# Patient Record
Sex: FEMALE | Race: WHITE | NOT HISPANIC OR LATINO | Employment: OTHER | URBAN - METROPOLITAN AREA
[De-identification: names, ages, dates, MRNs, and addresses within clinical notes are randomized per-mention and may not be internally consistent; named-entity substitution may affect disease eponyms.]

---

## 2017-02-24 ENCOUNTER — CONVERSION ENCOUNTER (OUTPATIENT)
Dept: MAMMOGRAPHY | Facility: HOSPITAL | Age: 67
End: 2017-02-24

## 2018-03-08 ENCOUNTER — CONVERSION ENCOUNTER (OUTPATIENT)
Dept: MAMMOGRAPHY | Facility: HOSPITAL | Age: 68
End: 2018-03-08

## 2018-07-30 ENCOUNTER — OFFICE VISIT CONVERTED (OUTPATIENT)
Dept: ORTHOPEDIC SURGERY | Facility: CLINIC | Age: 68
End: 2018-07-30
Attending: ORTHOPAEDIC SURGERY

## 2018-08-21 ENCOUNTER — APPOINTMENT (OUTPATIENT)
Dept: GENERAL RADIOLOGY | Facility: HOSPITAL | Age: 68
End: 2018-08-21

## 2018-08-21 ENCOUNTER — HOSPITAL ENCOUNTER (OUTPATIENT)
Facility: HOSPITAL | Age: 68
Setting detail: OBSERVATION
Discharge: HOME | End: 2018-08-22
Attending: EMERGENCY MEDICINE | Admitting: EMERGENCY MEDICINE

## 2018-08-21 ENCOUNTER — TELEPHONE (OUTPATIENT)
Dept: CARDIOLOGY | Facility: CLINIC | Age: 68
End: 2018-08-21

## 2018-08-21 DIAGNOSIS — R06.09 DYSPNEA ON EXERTION: ICD-10-CM

## 2018-08-21 DIAGNOSIS — R07.2 PRECORDIAL CHEST PAIN: Primary | ICD-10-CM

## 2018-08-21 LAB
ALBUMIN SERPL-MCNC: 4.3 G/DL (ref 3.5–5.2)
ALBUMIN/GLOB SERPL: 1.3 G/DL
ALP SERPL-CCNC: 126 U/L (ref 39–117)
ALT SERPL W P-5'-P-CCNC: 30 U/L (ref 1–33)
ANION GAP SERPL CALCULATED.3IONS-SCNC: 12 MMOL/L
AST SERPL-CCNC: 24 U/L (ref 1–32)
BASOPHILS # BLD AUTO: 0.01 10*3/MM3 (ref 0–0.2)
BASOPHILS NFR BLD AUTO: 0.2 % (ref 0–1.5)
BILIRUB SERPL-MCNC: 0.6 MG/DL (ref 0.1–1.2)
BUN BLD-MCNC: 17 MG/DL (ref 8–23)
BUN/CREAT SERPL: 16.8 (ref 7–25)
CALCIUM SPEC-SCNC: 9.2 MG/DL (ref 8.6–10.5)
CHLORIDE SERPL-SCNC: 105 MMOL/L (ref 98–107)
CO2 SERPL-SCNC: 24 MMOL/L (ref 22–29)
CREAT BLD-MCNC: 1.01 MG/DL (ref 0.57–1)
D DIMER PPP FEU-MCNC: <0.27 MCGFEU/ML (ref 0–0.49)
DEPRECATED RDW RBC AUTO: 43.6 FL (ref 37–54)
EOSINOPHIL # BLD AUTO: 0.05 10*3/MM3 (ref 0–0.7)
EOSINOPHIL NFR BLD AUTO: 0.9 % (ref 0.3–6.2)
ERYTHROCYTE [DISTWIDTH] IN BLOOD BY AUTOMATED COUNT: 12.8 % (ref 11.7–13)
GFR SERPL CREATININE-BSD FRML MDRD: 55 ML/MIN/1.73
GFR SERPL CREATININE-BSD FRML MDRD: 66 ML/MIN/1.73
GLOBULIN UR ELPH-MCNC: 3.4 GM/DL
GLUCOSE BLD-MCNC: 85 MG/DL (ref 65–99)
HCT VFR BLD AUTO: 41.3 % (ref 35.6–45.5)
HGB BLD-MCNC: 14.1 G/DL (ref 11.9–15.5)
HOLD SPECIMEN: NORMAL
HOLD SPECIMEN: NORMAL
IMM GRANULOCYTES # BLD: 0.01 10*3/MM3 (ref 0–0.03)
IMM GRANULOCYTES NFR BLD: 0.2 % (ref 0–0.5)
LYMPHOCYTES # BLD AUTO: 1.78 10*3/MM3 (ref 0.9–4.8)
LYMPHOCYTES NFR BLD AUTO: 33.1 % (ref 19.6–45.3)
MCH RBC QN AUTO: 31.9 PG (ref 26.9–32)
MCHC RBC AUTO-ENTMCNC: 34.1 G/DL (ref 32.4–36.3)
MCV RBC AUTO: 93.4 FL (ref 80.5–98.2)
MONOCYTES # BLD AUTO: 0.4 10*3/MM3 (ref 0.2–1.2)
MONOCYTES NFR BLD AUTO: 7.4 % (ref 5–12)
NEUTROPHILS # BLD AUTO: 3.13 10*3/MM3 (ref 1.9–8.1)
NEUTROPHILS NFR BLD AUTO: 58.4 % (ref 42.7–76)
NT-PROBNP SERPL-MCNC: 112.8 PG/ML (ref 5–900)
PLATELET # BLD AUTO: 193 10*3/MM3 (ref 140–500)
PMV BLD AUTO: 11.9 FL (ref 6–12)
POTASSIUM BLD-SCNC: 4.8 MMOL/L (ref 3.5–5.2)
PROT SERPL-MCNC: 7.7 G/DL (ref 6–8.5)
RBC # BLD AUTO: 4.42 10*6/MM3 (ref 3.9–5.2)
SODIUM BLD-SCNC: 141 MMOL/L (ref 136–145)
TROPONIN T SERPL-MCNC: <0.01 NG/ML (ref 0–0.03)
WBC NRBC COR # BLD: 5.37 10*3/MM3 (ref 4.5–10.7)
WHOLE BLOOD HOLD SPECIMEN: NORMAL
WHOLE BLOOD HOLD SPECIMEN: NORMAL

## 2018-08-21 PROCEDURE — 85025 COMPLETE CBC W/AUTO DIFF WBC: CPT | Performed by: NURSE PRACTITIONER

## 2018-08-21 PROCEDURE — G0378 HOSPITAL OBSERVATION PER HR: HCPCS

## 2018-08-21 PROCEDURE — 93005 ELECTROCARDIOGRAM TRACING: CPT

## 2018-08-21 PROCEDURE — 84484 ASSAY OF TROPONIN QUANT: CPT | Performed by: NURSE PRACTITIONER

## 2018-08-21 PROCEDURE — 93010 ELECTROCARDIOGRAM REPORT: CPT | Performed by: INTERNAL MEDICINE

## 2018-08-21 PROCEDURE — 99284 EMERGENCY DEPT VISIT MOD MDM: CPT

## 2018-08-21 PROCEDURE — 85379 FIBRIN DEGRADATION QUANT: CPT | Performed by: NURSE PRACTITIONER

## 2018-08-21 PROCEDURE — 83880 ASSAY OF NATRIURETIC PEPTIDE: CPT | Performed by: NURSE PRACTITIONER

## 2018-08-21 PROCEDURE — 93005 ELECTROCARDIOGRAM TRACING: CPT | Performed by: EMERGENCY MEDICINE

## 2018-08-21 PROCEDURE — 71046 X-RAY EXAM CHEST 2 VIEWS: CPT

## 2018-08-21 PROCEDURE — 80053 COMPREHEN METABOLIC PANEL: CPT | Performed by: NURSE PRACTITIONER

## 2018-08-21 RX ORDER — PRENATAL VIT NO.126/IRON/FOLIC 28MG-0.8MG
1 TABLET ORAL DAILY
COMMUNITY

## 2018-08-21 RX ORDER — SODIUM CHLORIDE 0.9 % (FLUSH) 0.9 %
10 SYRINGE (ML) INJECTION AS NEEDED
Status: DISCONTINUED | OUTPATIENT
Start: 2018-08-21 | End: 2018-08-22 | Stop reason: HOSPADM

## 2018-08-21 RX ORDER — SODIUM CHLORIDE 0.9 % (FLUSH) 0.9 %
1-10 SYRINGE (ML) INJECTION AS NEEDED
Status: DISCONTINUED | OUTPATIENT
Start: 2018-08-21 | End: 2018-08-22 | Stop reason: HOSPADM

## 2018-08-21 RX ORDER — NITROGLYCERIN 0.4 MG/1
0.4 TABLET SUBLINGUAL
Status: COMPLETED | OUTPATIENT
Start: 2018-08-21 | End: 2018-08-22

## 2018-08-21 RX ORDER — ATENOLOL 50 MG/1
50 TABLET ORAL DAILY
COMMUNITY
End: 2018-10-01 | Stop reason: ALTCHOICE

## 2018-08-21 RX ORDER — CHOLECALCIFEROL (VITAMIN D3) 50 MCG
2000 TABLET ORAL DAILY
COMMUNITY
End: 2021-09-23

## 2018-08-21 RX ORDER — ASPIRIN 325 MG
162 TABLET ORAL DAILY
Status: DISCONTINUED | OUTPATIENT
Start: 2018-08-21 | End: 2018-08-22 | Stop reason: HOSPADM

## 2018-08-21 RX ORDER — ROPINIROLE 0.5 MG/1
1 TABLET, FILM COATED ORAL NIGHTLY
COMMUNITY
End: 2021-09-23 | Stop reason: DRUGHIGH

## 2018-08-21 RX ORDER — ESTRADIOL 1 MG/1
1 TABLET ORAL DAILY
COMMUNITY
End: 2021-09-23 | Stop reason: SDUPTHER

## 2018-08-21 RX ORDER — DICLOFENAC SODIUM AND MISOPROSTOL 75; 200 MG/1; UG/1
1 TABLET, DELAYED RELEASE ORAL DAILY
COMMUNITY
End: 2021-09-23

## 2018-08-21 RX ORDER — ACETAMINOPHEN 325 MG/1
650 TABLET ORAL EVERY 4 HOURS PRN
Status: DISCONTINUED | OUTPATIENT
Start: 2018-08-21 | End: 2018-08-22 | Stop reason: HOSPADM

## 2018-08-21 RX ORDER — ASPIRIN 81 MG/1
81 TABLET ORAL DAILY
COMMUNITY
End: 2021-09-23 | Stop reason: SDUPTHER

## 2018-08-21 RX ADMIN — NITROGLYCERIN 0.4 MG: 0.4 TABLET SUBLINGUAL at 17:47

## 2018-08-21 RX ADMIN — ASPIRIN 162 MG: 325 TABLET ORAL at 17:29

## 2018-08-21 RX ADMIN — NITROGLYCERIN 0.4 MG: 0.4 TABLET SUBLINGUAL at 17:30

## 2018-08-21 RX ADMIN — ACETAMINOPHEN 650 MG: 325 TABLET, FILM COATED ORAL at 22:59

## 2018-08-21 RX ADMIN — NITROGLYCERIN 0.5 INCH: 20 OINTMENT TOPICAL at 19:04

## 2018-08-21 NOTE — ED PROVIDER NOTES
EMERGENCY DEPARTMENT ENCOUNTER    CHIEF COMPLAINT  Chief Complaint: Chest pain  History given by: Patient  History limited by: None  Room Number: 630/1  PMD: Mykel Schroeder MD      HPI:  Pt is a 67 y.o. female who presents complaining of non-radiating,  pressure-like chest pain and SOA which started almost 3 weeks ago. She adds that this morning, she woke up for the first time with the sensation of a lump in her throat and chest pain at rest. She notes that over the past few weeks the chest pain has worsened with activity, that she attempted to play golf with difficulty (SOA, chest pain), and had nausea 1 day at work but she denies recent illness and further sx at this time. Pt has an appointment with cardio 10/1/18 and now rates her pain as 4/10.    Duration:  Almost 3 weeks  Onset: Gradual  Timing: Constant  Location: Chest  Radiation: None  Quality: Pressure  Intensity/Severity: Moderate, 4/10  Progression: Unchanged  Associated Symptoms: SOA, sensation of a lump in her throat, nausea  Aggravating Factors: Activity  Alleviating Factors: None specified  Previous Episodes: None specified  Treatment before arrival: None specified    PAST MEDICAL HISTORY  Active Ambulatory Problems     Diagnosis Date Noted   • No Active Ambulatory Problems     Resolved Ambulatory Problems     Diagnosis Date Noted   • No Resolved Ambulatory Problems     Past Medical History:   Diagnosis Date   • Mitral valve disorder    • Restless leg        PAST SURGICAL HISTORY  History reviewed. No pertinent surgical history.    FAMILY HISTORY  History reviewed. No pertinent family history.    SOCIAL HISTORY  Social History     Social History   • Marital status:      Spouse name: N/A   • Number of children: N/A   • Years of education: N/A     Occupational History   • Not on file.     Social History Main Topics   • Smoking status: Never Smoker   • Smokeless tobacco: Not on file   • Alcohol use No   • Drug use: Unknown   • Sexual  activity: Not on file     Other Topics Concern   • Not on file     Social History Narrative   • No narrative on file       ALLERGIES  Patient has no known allergies.    REVIEW OF SYSTEMS  Review of Systems   Constitutional: Negative for fever.   HENT: Negative for sore throat.         Senstion of lump in her throat   Eyes: Negative.    Respiratory: Positive for shortness of breath. Negative for cough.    Cardiovascular: Positive for chest pain (pressure-like).   Gastrointestinal: Positive for nausea. Negative for abdominal pain, diarrhea and vomiting.   Genitourinary: Negative for dysuria.   Musculoskeletal: Negative for neck pain.   Skin: Negative for rash.   Allergic/Immunologic: Negative.    Neurological: Negative for weakness, numbness and headaches.   Hematological: Negative.    Psychiatric/Behavioral: Negative.    All other systems reviewed and are negative.      PHYSICAL EXAM  ED Triage Vitals   Temp Heart Rate Resp BP SpO2   08/21/18 1559 08/21/18 1559 08/21/18 1639 08/21/18 1639 08/21/18 1559   97.1 °F (36.2 °C) 72 18 140/84 100 %      Temp src Heart Rate Source Patient Position BP Location FiO2 (%)   -- -- 08/21/18 1656 08/21/18 1656 --     Sitting Right arm        Physical Exam   Constitutional: She is oriented to person, place, and time. No distress.   HENT:   Head: Normocephalic and atraumatic.   Eyes: Pupils are equal, round, and reactive to light. EOM are normal.   Neck: Normal range of motion. Neck supple.   Cardiovascular: Normal rate, regular rhythm and normal heart sounds.    No calf tenderness or pedal edema   Pulmonary/Chest: Breath sounds normal. No respiratory distress.   Pt is mildly dyspneic   Abdominal: Soft. There is no tenderness. There is no rebound and no guarding.   Musculoskeletal: Normal range of motion. She exhibits no edema.   Neurological: She is alert and oriented to person, place, and time. She has normal sensation and normal strength.   Skin: Skin is warm and dry. No rash  noted.   Psychiatric: Mood and affect normal.   Nursing note and vitals reviewed.      LAB RESULTS  Lab Results (last 24 hours)     Procedure Component Value Units Date/Time    CBC & Differential [[id number]] Collected:  08/21/18 1644    Specimen:  Blood Updated:  08/21/18 1658    Narrative:       The following orders were created for panel order CBC & Differential.  Procedure                               Abnormality         Status                     ---------                               -----------         ------                     CBC Auto Differential[[id number]]        Normal              Final result                 Please view results for these tests on the individual orders.    Comprehensive Metabolic Panel [[id number]]  (Abnormal) Collected:  08/21/18 1644    Specimen:  Blood Updated:  08/21/18 1725     Glucose 85 mg/dL      BUN 17 mg/dL      Creatinine 1.01 (H) mg/dL      Sodium 141 mmol/L      Potassium 4.8 mmol/L      Chloride 105 mmol/L      CO2 24.0 mmol/L      Calcium 9.2 mg/dL      Total Protein 7.7 g/dL      Albumin 4.30 g/dL      ALT (SGPT) 30 U/L      AST (SGOT) 24 U/L      Alkaline Phosphatase 126 (H) U/L      Total Bilirubin 0.6 mg/dL      eGFR Non African Amer 55 (L) mL/min/1.73      eGFR  African Amer 66 mL/min/1.73      Globulin 3.4 gm/dL      A/G Ratio 1.3 g/dL      BUN/Creatinine Ratio 16.8     Anion Gap 12.0 mmol/L     BNP [[id number]]  (Normal) Collected:  08/21/18 1644    Specimen:  Blood Updated:  08/21/18 1721     proBNP 112.8 pg/mL     Narrative:       Among patients with dyspnea, NT-proBNP is highly sensitive for the detection of acute congestive heart failure. In addition NT-proBNP of <300 pg/ml effectively rules out acute congestive heart failure with 99% negative predictive value.    Troponin [[id number]]  (Normal) Collected:  08/21/18 1644    Specimen:  Blood Updated:  08/21/18 1725     Troponin T <0.010 ng/mL     Narrative:       Troponin T Reference Ranges:  Less than 0.03  ng/mL:    Negative for AMI  0.03 to 0.09 ng/mL:      Indeterminant for AMI  Greater than 0.09 ng/mL: Positive for AMI    D-dimer, Quantitative [[id number]]  (Normal) Collected:  08/21/18 1644    Specimen:  Blood Updated:  08/21/18 1731     D-Dimer, Quantitative <0.27 MCGFEU/mL     Narrative:       The Stago D-Dimer test used in conjunction with a clinical pretest probability (PTP) assessment model, has been approved by the FDA to rule out the presence of venous thromboembolism (VTE) in outpatients suspected of deep venous thrombosis (DVT) or pulmonary embolism (PE).     CBC Auto Differential [[id number]]  (Normal) Collected:  08/21/18 1644    Specimen:  Blood Updated:  08/21/18 1658     WBC 5.37 10*3/mm3      RBC 4.42 10*6/mm3      Hemoglobin 14.1 g/dL      Hematocrit 41.3 %      MCV 93.4 fL      MCH 31.9 pg      MCHC 34.1 g/dL      RDW 12.8 %      RDW-SD 43.6 fl      MPV 11.9 fL      Platelets 193 10*3/mm3      Neutrophil % 58.4 %      Lymphocyte % 33.1 %      Monocyte % 7.4 %      Eosinophil % 0.9 %      Basophil % 0.2 %      Immature Grans % 0.2 %      Neutrophils, Absolute 3.13 10*3/mm3      Lymphocytes, Absolute 1.78 10*3/mm3      Monocytes, Absolute 0.40 10*3/mm3      Eosinophils, Absolute 0.05 10*3/mm3      Basophils, Absolute 0.01 10*3/mm3      Immature Grans, Absolute 0.01 10*3/mm3           I ordered the above labs and reviewed the results    RADIOLOGY  XR Chest 2 View   Final Result         I ordered the above noted radiological studies. Interpreted by radiologist. Reviewed by me in PACS.     PROCEDURES  Procedures    EKG           EKG time: 1610  Rhythm/Rate: Sinus 61   P waves and CT: NML  QRS, axis: NML   ST and T waves: Non specific T wave changes    Interpreted Contemporaneously by me, independently viewed  No old EKG for comparison    PROGRESS AND CONSULTS  ED Course as of Aug 21 2220   Tue Aug 21, 2018   1653 Chest pain for 2 weeks, now happening at rest    [EP]   1720 HEART score is 5  [WH]   1849 I  discussed test results with the patient and her family as well as the plan for admission.  Patient states her chest discomfort has improved with nitroglycerin.  Nitropaste will be ordered.  [WH]      ED Course User Index  [EP] Katerina Recio APRN  [WH] Flaco Juarez MD   1637-Ordered blood work and EKG    1715-Checked patient and discussed plan to order blood work and imaging for further evaluation. Pt understands and agrees with the plan, all questions answered.    1719-Ordered nitroglycerin    1845-Discussed patient's case with Dr. Oliver (cardio) who agrees to admit the patient.     1850-Rechecked patient and discussed plan to admit. Pt understands and agrees with the plan, all questions answered.    1851-Ordered nitroglycerin.    MEDICAL DECISION MAKING  Results were reviewed/discussed with the patient and they were also made aware of online access. Pt also made aware that some labs, such as cultures, will not be resulted during ER visit and follow up with PMD is necessary.     MDM  Number of Diagnoses or Management Options  Dyspnea on exertion:   Precordial chest pain:   Diagnosis management comments: Patient's symptoms were concerning for angina.  Her pain improved with nitroglycerin.  EKG had nonspecific changes.  Patient had a heart score of 5.  Case was discussed with Dr. oliver and she agreed to admit the patient.       Amount and/or Complexity of Data Reviewed  Clinical lab tests: reviewed (Troponin is <0.010, d-dimer<0.027, wlfQIQ=596.8, creatinine=1.01)  Tests in the radiology section of CPT®: reviewed (CXR shows NAD.)  Tests in the medicine section of CPT®: ordered and reviewed (See EKG procedure note.)  Discuss the patient with other providers: yes (Dr. Oliver (cardio))    Patient Progress  Patient progress: stable         DIAGNOSIS  Final diagnoses:   Precordial chest pain   Dyspnea on exertion       DISPOSITION  ADMISSION    Discussed treatment plan and reason for admission with pt/family and  admitting physician.  Pt/family voiced understanding of the plan for admission for further testing/treatment as needed.     Latest Documented Vital Signs:  As of 10:20 PM  BP- 119/69 HR- 54 Temp- 97.3 °F (36.3 °C) (Oral) O2 sat- 100%    --  Documentation assistance provided by jarred Sevilla for Dr. Juarez.  Information recorded by the scribe was done at my direction and has been verified and validated by me.              Bruna Sevilla  08/21/18 2006       Flaco Juarez MD  08/21/18 2926

## 2018-08-21 NOTE — PROGRESS NOTES
Clinical Pharmacy Services: Medication History    Audrey Emerson is a 67 y.o. female presenting to [facility] for   Chief Complaint   Patient presents with   • Chest Pain     CHEST PRESSURE X2 WEEKS       She  has a past medical history of Mitral valve disorder and Restless leg.    Allergies as of 08/21/2018   • (No Known Allergies)       Medication information was obtained from: Patient  Pharmacy and Phone Number: [facility] [phone]    Prior to Admission Medications     Prescriptions Last Dose Informant Patient Reported? Taking?    aspirin 81 MG EC tablet 8/20/2018 Self Yes Yes    Take 81 mg by mouth Daily. Patient has been taking 2 daily for the last few days due to chest pain.    atenolol (TENORMIN) 50 MG tablet 8/20/2018 Self Yes Yes    Take 50 mg by mouth Daily.    calcium carbonate-cholecalciferol 500-400 MG-UNIT tablet tablet 8/20/2018 Self Yes Yes    Take 1 tablet by mouth Daily.    Cholecalciferol (VITAMIN D) 2000 units tablet 8/20/2018 Self Yes Yes    Take 2,000 Units by mouth Daily.    diclofenac-misoprostol (ARTHROTEC 75) 75-0.2 MG EC tablet 8/20/2018 Self Yes Yes    Take 1 tablet by mouth Daily.    estradiol (ESTRACE) 1 MG tablet 8/20/2018 Self Yes Yes    Take 1 mg by mouth Daily.    Prenatal Vit-Fe Fumarate-FA (PRENATAL, CLASSIC, VITAMIN) 28-0.8 MG tablet tablet 8/20/2018 Self Yes Yes    Take 1 tablet by mouth Daily.    rOPINIRole (REQUIP) 0.5 MG tablet 8/20/2018 Self Yes Yes    Take 1 mg by mouth Every Night. Take 1 hour before bedtime.             Medication notes: Calcium w/D, Vitamin D, and Prenatal vitamins added to profile per patient.    This medication list is complete to the best of my knowledge as of 8/21/2018    Please call if questions.    Nahed Castanon, Medication History Technician  8/21/2018 6:51 PM

## 2018-08-21 NOTE — PROGRESS NOTES
Discharge Planning Assessment  [facility]     Patient Name: Audrey Emerson  MRN: [id number]  Today's Date: 8/21/2018    Admit Date: 8/21/2018          Discharge Needs Assessment     Row Name 08/21/18 1932       Living Environment    Lives With spouse    Current Living Arrangements home/apartment/condo    Primary Care Provided by self    Provides Primary Care For no one    Family Caregiver if Needed child(horace), adult;spouse    Quality of Family Relationships helpful;involved;supportive    Able to Return to Prior Arrangements yes       Resource/Environmental Concerns    Resource/Environmental Concerns none    Transportation Concerns car, none       Transition Planning    Patient/Family Anticipates Transition to home    Patient/Family Anticipated Services at Transition none    Transportation Anticipated car, drives self       Discharge Needs Assessment    Readmission Within the Last 30 Days no previous admission in last 30 days    Concerns to be Addressed no discharge needs identified    Equipment Currently Used at Home none    Anticipated Changes Related to Illness none    Equipment Needed After Discharge none            Discharge Plan    No documentation.       Destination     No service coordination in this encounter.      Durable Medical Equipment     No service coordination in this encounter.      Dialysis/Infusion     No service coordination in this encounter.      Home Medical Care     No service coordination in this encounter.      Social Care     No service coordination in this encounter.                Demographic Summary    No documentation.           Functional Status    No documentation.           Psychosocial    No documentation.           Abuse/Neglect    No documentation.           Legal    No documentation.           Substance Abuse    No documentation.           Patient Forms    No documentation.         Naheed Williamson RN

## 2018-08-22 ENCOUNTER — APPOINTMENT (OUTPATIENT)
Dept: CARDIOLOGY | Facility: HOSPITAL | Age: 68
End: 2018-08-22
Attending: INTERNAL MEDICINE

## 2018-08-22 VITALS
HEIGHT: 65 IN | OXYGEN SATURATION: 100 % | SYSTOLIC BLOOD PRESSURE: 106 MMHG | TEMPERATURE: 97 F | DIASTOLIC BLOOD PRESSURE: 68 MMHG | BODY MASS INDEX: 22.85 KG/M2 | RESPIRATION RATE: 16 BRPM | HEART RATE: 63 BPM | WEIGHT: 137.13 LBS

## 2018-08-22 LAB
ANION GAP SERPL CALCULATED.3IONS-SCNC: 11.2 MMOL/L
BH CV ECHO MEAS - ACS: 1.8 CM
BH CV ECHO MEAS - AO MAX PG (FULL): 1.7 MMHG
BH CV ECHO MEAS - AO MAX PG: 4.9 MMHG
BH CV ECHO MEAS - AO MEAN PG (FULL): 1 MMHG
BH CV ECHO MEAS - AO MEAN PG: 2 MMHG
BH CV ECHO MEAS - AO ROOT AREA (BSA CORRECTED): 1.5
BH CV ECHO MEAS - AO ROOT AREA: 5.3 CM^2
BH CV ECHO MEAS - AO ROOT DIAM: 2.6 CM
BH CV ECHO MEAS - AO V2 MAX: 111 CM/SEC
BH CV ECHO MEAS - AO V2 MEAN: 66.1 CM/SEC
BH CV ECHO MEAS - AO V2 VTI: 29.2 CM
BH CV ECHO MEAS - AVA(I,A): 2.4 CM^2
BH CV ECHO MEAS - AVA(I,D): 2.4 CM^2
BH CV ECHO MEAS - AVA(V,A): 2.6 CM^2
BH CV ECHO MEAS - AVA(V,D): 2.6 CM^2
BH CV ECHO MEAS - BSA(HAYCOCK): 1.7 M^2
BH CV ECHO MEAS - BSA: 1.7 M^2
BH CV ECHO MEAS - BZI_BMI: 22.8 KILOGRAMS/M^2
BH CV ECHO MEAS - BZI_METRIC_HEIGHT: 165.1 CM
BH CV ECHO MEAS - BZI_METRIC_WEIGHT: 62.1 KG
BH CV ECHO MEAS - EDV(CUBED): 74.1 ML
BH CV ECHO MEAS - EDV(MOD-SP2): 74 ML
BH CV ECHO MEAS - EDV(MOD-SP4): 72 ML
BH CV ECHO MEAS - EDV(TEICH): 78.6 ML
BH CV ECHO MEAS - EF(CUBED): 70.4 %
BH CV ECHO MEAS - EF(MOD-BP): 55 %
BH CV ECHO MEAS - EF(MOD-SP2): 59.5 %
BH CV ECHO MEAS - EF(MOD-SP4): 52.8 %
BH CV ECHO MEAS - EF(TEICH): 62.4 %
BH CV ECHO MEAS - ESV(CUBED): 22 ML
BH CV ECHO MEAS - ESV(MOD-SP2): 30 ML
BH CV ECHO MEAS - ESV(MOD-SP4): 34 ML
BH CV ECHO MEAS - ESV(TEICH): 29.6 ML
BH CV ECHO MEAS - FS: 33.3 %
BH CV ECHO MEAS - IVS/LVPW: 1
BH CV ECHO MEAS - IVSD: 0.9 CM
BH CV ECHO MEAS - LAT PEAK E' VEL: 10 CM/SEC
BH CV ECHO MEAS - LV DIASTOLIC VOL/BSA (35-75): 42.7 ML/M^2
BH CV ECHO MEAS - LV MASS(C)D: 118.7 GRAMS
BH CV ECHO MEAS - LV MASS(C)DI: 70.5 GRAMS/M^2
BH CV ECHO MEAS - LV MAX PG: 3.3 MMHG
BH CV ECHO MEAS - LV MEAN PG: 1 MMHG
BH CV ECHO MEAS - LV SYSTOLIC VOL/BSA (12-30): 20.2 ML/M^2
BH CV ECHO MEAS - LV V1 MAX: 90.2 CM/SEC
BH CV ECHO MEAS - LV V1 MEAN: 53.7 CM/SEC
BH CV ECHO MEAS - LV V1 VTI: 22.1 CM
BH CV ECHO MEAS - LVIDD: 4.2 CM
BH CV ECHO MEAS - LVIDS: 2.8 CM
BH CV ECHO MEAS - LVLD AP2: 6.9 CM
BH CV ECHO MEAS - LVLD AP4: 7.4 CM
BH CV ECHO MEAS - LVLS AP2: 6.5 CM
BH CV ECHO MEAS - LVLS AP4: 5.8 CM
BH CV ECHO MEAS - LVOT AREA (M): 3.1 CM^2
BH CV ECHO MEAS - LVOT AREA: 3.1 CM^2
BH CV ECHO MEAS - LVOT DIAM: 2 CM
BH CV ECHO MEAS - LVPWD: 0.9 CM
BH CV ECHO MEAS - MED PEAK E' VEL: 6 CM/SEC
BH CV ECHO MEAS - MV A DUR: 0.12 SEC
BH CV ECHO MEAS - MV A MAX VEL: 43.7 CM/SEC
BH CV ECHO MEAS - MV DEC SLOPE: 260 CM/SEC^2
BH CV ECHO MEAS - MV DEC TIME: 0.18 SEC
BH CV ECHO MEAS - MV E MAX VEL: 68.4 CM/SEC
BH CV ECHO MEAS - MV E/A: 1.6
BH CV ECHO MEAS - MV MAX PG: 1.9 MMHG
BH CV ECHO MEAS - MV MEAN PG: 1 MMHG
BH CV ECHO MEAS - MV P1/2T MAX VEL: 68.3 CM/SEC
BH CV ECHO MEAS - MV P1/2T: 76.9 MSEC
BH CV ECHO MEAS - MV V2 MAX: 68.9 CM/SEC
BH CV ECHO MEAS - MV V2 MEAN: 41.5 CM/SEC
BH CV ECHO MEAS - MV V2 VTI: 23.5 CM
BH CV ECHO MEAS - MVA P1/2T LCG: 3.2 CM^2
BH CV ECHO MEAS - MVA(P1/2T): 2.9 CM^2
BH CV ECHO MEAS - MVA(VTI): 3 CM^2
BH CV ECHO MEAS - PA ACC TIME: 0.16 SEC
BH CV ECHO MEAS - PA MAX PG (FULL): 0.6 MMHG
BH CV ECHO MEAS - PA MAX PG: 1.9 MMHG
BH CV ECHO MEAS - PA PR(ACCEL): 9.3 MMHG
BH CV ECHO MEAS - PA V2 MAX: 68.3 CM/SEC
BH CV ECHO MEAS - PULM A REVS DUR: 0.12 SEC
BH CV ECHO MEAS - PULM A REVS VEL: 26.2 CM/SEC
BH CV ECHO MEAS - PULM DIAS VEL: 43.2 CM/SEC
BH CV ECHO MEAS - PULM S/D: 1.3
BH CV ECHO MEAS - PULM SYS VEL: 54.8 CM/SEC
BH CV ECHO MEAS - PVA(V,A): 5.4 CM^2
BH CV ECHO MEAS - PVA(V,D): 5.4 CM^2
BH CV ECHO MEAS - QP/QS: 1.4
BH CV ECHO MEAS - RAP SYSTOLE: 3 MMHG
BH CV ECHO MEAS - RV MAX PG: 1.3 MMHG
BH CV ECHO MEAS - RV MEAN PG: 1 MMHG
BH CV ECHO MEAS - RV V1 MAX: 56.2 CM/SEC
BH CV ECHO MEAS - RV V1 MEAN: 34.8 CM/SEC
BH CV ECHO MEAS - RV V1 VTI: 15 CM
BH CV ECHO MEAS - RVOT AREA: 6.6 CM^2
BH CV ECHO MEAS - RVOT DIAM: 2.9 CM
BH CV ECHO MEAS - RVSP: 17 MMHG
BH CV ECHO MEAS - SI(AO): 92 ML/M^2
BH CV ECHO MEAS - SI(CUBED): 31 ML/M^2
BH CV ECHO MEAS - SI(LVOT): 41.2 ML/M^2
BH CV ECHO MEAS - SI(MOD-SP2): 26.1 ML/M^2
BH CV ECHO MEAS - SI(MOD-SP4): 22.6 ML/M^2
BH CV ECHO MEAS - SI(TEICH): 29.1 ML/M^2
BH CV ECHO MEAS - SV(AO): 155 ML
BH CV ECHO MEAS - SV(CUBED): 52.1 ML
BH CV ECHO MEAS - SV(LVOT): 69.4 ML
BH CV ECHO MEAS - SV(MOD-SP2): 44 ML
BH CV ECHO MEAS - SV(MOD-SP4): 38 ML
BH CV ECHO MEAS - SV(RVOT): 99.1 ML
BH CV ECHO MEAS - SV(TEICH): 49 ML
BH CV ECHO MEAS - TAPSE (>1.6): 2.1 CM2
BH CV ECHO MEAS - TR MAX VEL: 187 CM/SEC
BH CV ECHO MEASUREMENTS AVERAGE E/E' RATIO: 8.55
BH CV STRESS BP STAGE 1: NORMAL
BH CV STRESS BP STAGE 2: NORMAL
BH CV STRESS BP STAGE 3: NORMAL
BH CV STRESS DURATION MIN STAGE 1: 3
BH CV STRESS DURATION MIN STAGE 2: 3
BH CV STRESS DURATION MIN STAGE 3: 0
BH CV STRESS DURATION SEC STAGE 1: 0
BH CV STRESS DURATION SEC STAGE 2: 0
BH CV STRESS DURATION SEC STAGE 3: 1
BH CV STRESS ECHO POST STRESS EJECTION FRACTION EF: 66 %
BH CV STRESS GRADE STAGE 1: 10
BH CV STRESS GRADE STAGE 2: 12
BH CV STRESS GRADE STAGE 3: 14
BH CV STRESS HR STAGE 1: 118
BH CV STRESS HR STAGE 2: 141
BH CV STRESS HR STAGE 3: 141
BH CV STRESS METS STAGE 1: 5
BH CV STRESS METS STAGE 2: 7.5
BH CV STRESS METS STAGE 3: 10
BH CV STRESS PROTOCOL 1: NORMAL
BH CV STRESS RECOVERY BP: NORMAL MMHG
BH CV STRESS RECOVERY HR: 76 BPM
BH CV STRESS SPEED STAGE 1: 1.7
BH CV STRESS SPEED STAGE 2: 2.5
BH CV STRESS SPEED STAGE 3: 3.4
BH CV STRESS STAGE 1: 1
BH CV STRESS STAGE 2: 2
BH CV STRESS STAGE 3: 3
BH CV VAS BP RIGHT ARM: NORMAL MMHG
BH CV XLRA - RV BASE: 2.7 CM
BH CV XLRA - TDI S': 10 CM/SEC
BUN BLD-MCNC: 19 MG/DL (ref 8–23)
BUN/CREAT SERPL: 19.4 (ref 7–25)
CALCIUM SPEC-SCNC: 9 MG/DL (ref 8.6–10.5)
CHLORIDE SERPL-SCNC: 108 MMOL/L (ref 98–107)
CO2 SERPL-SCNC: 22.8 MMOL/L (ref 22–29)
CREAT BLD-MCNC: 0.98 MG/DL (ref 0.57–1)
CRP SERPL-MCNC: 0.27 MG/DL (ref 0–0.5)
ERYTHROCYTE [SEDIMENTATION RATE] IN BLOOD: 7 MM/HR (ref 0–30)
GFR SERPL CREATININE-BSD FRML MDRD: 57 ML/MIN/1.73
GLUCOSE BLD-MCNC: 79 MG/DL (ref 65–99)
LEFT ATRIUM VOLUME INDEX: 12 ML/M2
LV EF 2D ECHO EST: 55 %
MAXIMAL PREDICTED HEART RATE: 154 BPM
PERCENT MAX PREDICTED HR: 91.56 %
POTASSIUM BLD-SCNC: 4.5 MMOL/L (ref 3.5–5.2)
SODIUM BLD-SCNC: 142 MMOL/L (ref 136–145)
STRESS BASELINE BP: NORMAL MMHG
STRESS BASELINE HR: 87 BPM
STRESS PERCENT HR: 108 %
STRESS POST ESTIMATED WORKLOAD: 7.1 METS
STRESS POST EXERCISE DUR MIN: 6 MIN
STRESS POST EXERCISE DUR SEC: 1 SEC
STRESS POST PEAK BP: NORMAL MMHG
STRESS POST PEAK HR: 141 BPM
STRESS TARGET HR: 131 BPM
TROPONIN T SERPL-MCNC: <0.01 NG/ML (ref 0–0.03)
TSH SERPL DL<=0.05 MIU/L-ACNC: 2.09 MIU/ML (ref 0.27–4.2)

## 2018-08-22 PROCEDURE — 85652 RBC SED RATE AUTOMATED: CPT | Performed by: INTERNAL MEDICINE

## 2018-08-22 PROCEDURE — 93325 DOPPLER ECHO COLOR FLOW MAPG: CPT

## 2018-08-22 PROCEDURE — 93320 DOPPLER ECHO COMPLETE: CPT

## 2018-08-22 PROCEDURE — 93325 DOPPLER ECHO COLOR FLOW MAPG: CPT | Performed by: INTERNAL MEDICINE

## 2018-08-22 PROCEDURE — 93350 STRESS TTE ONLY: CPT | Performed by: INTERNAL MEDICINE

## 2018-08-22 PROCEDURE — 80048 BASIC METABOLIC PNL TOTAL CA: CPT | Performed by: INTERNAL MEDICINE

## 2018-08-22 PROCEDURE — G0378 HOSPITAL OBSERVATION PER HR: HCPCS

## 2018-08-22 PROCEDURE — 93018 CV STRESS TEST I&R ONLY: CPT | Performed by: INTERNAL MEDICINE

## 2018-08-22 PROCEDURE — 36415 COLL VENOUS BLD VENIPUNCTURE: CPT | Performed by: INTERNAL MEDICINE

## 2018-08-22 PROCEDURE — 25010000002 ONDANSETRON PER 1 MG: Performed by: INTERNAL MEDICINE

## 2018-08-22 PROCEDURE — 93350 STRESS TTE ONLY: CPT

## 2018-08-22 PROCEDURE — 93005 ELECTROCARDIOGRAM TRACING: CPT | Performed by: INTERNAL MEDICINE

## 2018-08-22 PROCEDURE — 93352 ADMIN ECG CONTRAST AGENT: CPT | Performed by: INTERNAL MEDICINE

## 2018-08-22 PROCEDURE — 84484 ASSAY OF TROPONIN QUANT: CPT | Performed by: INTERNAL MEDICINE

## 2018-08-22 PROCEDURE — 99234 HOSP IP/OBS SM DT SF/LOW 45: CPT | Performed by: INTERNAL MEDICINE

## 2018-08-22 PROCEDURE — 93017 CV STRESS TEST TRACING ONLY: CPT

## 2018-08-22 PROCEDURE — 86140 C-REACTIVE PROTEIN: CPT | Performed by: INTERNAL MEDICINE

## 2018-08-22 PROCEDURE — 84443 ASSAY THYROID STIM HORMONE: CPT | Performed by: INTERNAL MEDICINE

## 2018-08-22 PROCEDURE — 96374 THER/PROPH/DIAG INJ IV PUSH: CPT

## 2018-08-22 PROCEDURE — 96376 TX/PRO/DX INJ SAME DRUG ADON: CPT

## 2018-08-22 PROCEDURE — 25010000002 PERFLUTREN (DEFINITY) 8.476 MG IN SODIUM CHLORIDE 0.9 % 10 ML INJECTION: Performed by: INTERNAL MEDICINE

## 2018-08-22 PROCEDURE — 93016 CV STRESS TEST SUPVJ ONLY: CPT | Performed by: INTERNAL MEDICINE

## 2018-08-22 PROCEDURE — 93010 ELECTROCARDIOGRAM REPORT: CPT | Performed by: INTERNAL MEDICINE

## 2018-08-22 PROCEDURE — 93320 DOPPLER ECHO COMPLETE: CPT | Performed by: INTERNAL MEDICINE

## 2018-08-22 RX ORDER — ONDANSETRON 2 MG/ML
4 INJECTION INTRAMUSCULAR; INTRAVENOUS EVERY 6 HOURS PRN
Status: DISCONTINUED | OUTPATIENT
Start: 2018-08-22 | End: 2018-08-22 | Stop reason: HOSPADM

## 2018-08-22 RX ADMIN — ACETAMINOPHEN 650 MG: 325 TABLET, FILM COATED ORAL at 14:36

## 2018-08-22 RX ADMIN — PERFLUTREN 4 ML: 6.52 INJECTION, SUSPENSION INTRAVENOUS at 13:00

## 2018-08-22 RX ADMIN — ONDANSETRON 4 MG: 2 INJECTION, SOLUTION INTRAMUSCULAR; INTRAVENOUS at 14:36

## 2018-08-22 RX ADMIN — ONDANSETRON 4 MG: 2 INJECTION, SOLUTION INTRAMUSCULAR; INTRAVENOUS at 09:02

## 2018-08-22 RX ADMIN — NITROGLYCERIN 0.4 MG: 0.4 TABLET SUBLINGUAL at 05:13

## 2018-08-22 RX ADMIN — ASPIRIN 162 MG: 325 TABLET ORAL at 09:01

## 2018-08-22 NOTE — PLAN OF CARE
Problem: Patient Care Overview  Goal: Plan of Care Review  Outcome: Ongoing (interventions implemented as appropriate)   08/22/18 0640   Coping/Psychosocial   Plan of Care Reviewed With patient   Plan of Care Review   Progress no change   OTHER   Outcome Summary pt admitted c/o pressure in chest area left to right; VSS; NPO since midnight; nitro SL given x1 w/ good results; STAT EKG - normal; STAT troponin - normal; O2 2LNC for maintenance; will continue to monitor       Problem: Fall Risk (Adult)  Goal: Identify Related Risk Factors and Signs and Symptoms  Outcome: Ongoing (interventions implemented as appropriate)    Goal: Absence of Fall  Outcome: Ongoing (interventions implemented as appropriate)      Problem: Pain, Acute (Adult)  Goal: Identify Related Risk Factors and Signs and Symptoms  Outcome: Ongoing (interventions implemented as appropriate)    Goal: Acceptable Pain Control/Comfort Level  Outcome: Ongoing (interventions implemented as appropriate)

## 2018-08-22 NOTE — PROGRESS NOTES
Continued Stay Note  [facility]     Patient Name: Audrey Emerson  MRN: [id number]  Today's Date: 8/22/2018    Admit Date: 8/21/2018          Discharge Plan     Row Name 08/22/18 1420       Plan    Plan Home     Plan Comments Spoke with pt at bedside. Role of CCP explained. Pt plans to return home at dc and denies any discharge planning needs at this time. Pt anticipates dc today. CCP to follow. JChasteenRN/CCP               Discharge Codes    No documentation.           Blaire Thurman RN

## 2018-08-22 NOTE — H&P
Patient Name: Audrey Emerson  Age/Sex: 67 y.o. female  : 1950  MRN: [id number]    Date of Admission: 2018  Date of Encounter Visit: 18  Encounter Provider: Geovany Golden MD  Place of Service: [facility] CARDIOLOGY      Referring Provider: Blaire lOiver MD  Patient Care Team:  Mykel Schroeder MD as PCP - General (Family Medicine)    Subjective:   Admitted/Consulted for:chest pain  Chief Complaint: chest pain      History of Present Illness:  67-year-old female with a medical history of mitral valve disorder and restless leg syndrome.  Currently unable to find any related records regarding her medical history at this point.       2018--She presented to the ED with complaints of nonradiating,  pressure sensation, chest pain, with shortness of air that started 2-3 weeks ago.  Today was the first time that she felt like she had a lump in her throat and the chest pain occurred at rest.  She stated that she's noticed over the past few weeks the chest pain is worse with exertional activity even when she attempted to play golf she was having some shortness of air and chest discomfort.  Her chest pain is rated as of 4/10 on pain scale.           Previous testing:       Past Medical History:  Past Medical History:   Diagnosis Date   • Mitral valve disorder    • Restless leg        History reviewed. No pertinent surgical history.    Home Medications:   Prescriptions Prior to Admission   Medication Sig Dispense Refill Last Dose   • aspirin 81 MG EC tablet Take 81 mg by mouth Daily. Patient has been taking 2 daily for the last few days due to chest pain.   2018   • atenolol (TENORMIN) 50 MG tablet Take 50 mg by mouth Daily.   2018   • calcium carbonate-cholecalciferol 500-400 MG-UNIT tablet tablet Take 1 tablet by mouth Daily.   2018   • Cholecalciferol (VITAMIN D) 2000 units tablet Take 2,000 Units by mouth Daily.   2018   •  diclofenac-misoprostol (ARTHROTEC 75) 75-0.2 MG EC tablet Take 1 tablet by mouth Daily.   8/20/2018   • estradiol (ESTRACE) 1 MG tablet Take 1 mg by mouth Daily.   8/20/2018   • Prenatal Vit-Fe Fumarate-FA (PRENATAL, CLASSIC, VITAMIN) 28-0.8 MG tablet tablet Take 1 tablet by mouth Daily.   8/20/2018   • rOPINIRole (REQUIP) 0.5 MG tablet Take 1 mg by mouth Every Night. Take 1 hour before bedtime.    8/20/2018       Allergies:  No Known Allergies    Past Social History:  Social History     Social History   • Marital status:      Spouse name: N/A   • Number of children: N/A   • Years of education: N/A     Occupational History   • Not on file.     Social History Main Topics   • Smoking status: Never Smoker   • Smokeless tobacco: Not on file   • Alcohol use No   • Drug use: Unknown   • Sexual activity: Not on file     Other Topics Concern   • Not on file     Social History Narrative   • No narrative on file        Past Family History:  History reviewed. No pertinent family history.    Review of Systems: All systems reviewed. Pertinent positives identified in HPI. All other systems are negative.     REVIEW OF SYSTEMS:   CONSTITUTIONAL: No weight loss, fever, chills, weakness or fatigue.   HEENT: Eyes: No visual loss, blurred vision, double vision or yellow sclerae. Ears, Nose, Throat: No hearing loss, sneezing, congestion, runny nose or sore throat.   SKIN: No rash or itching.     RESPIRATORY: No shortness of breath, hemoptysis, cough or sputum.   GASTROINTESTINAL: No anorexia, nausea, vomiting or diarrhea. No abdominal pain, bright red blood per rectum or melena.  GENITOURINARY: No burning on urination, hematuria or increased frequency.  NEUROLOGICAL: No headache, dizziness, syncope, paralysis, ataxia, numbness or tingling in the extremities. No change in bowel or bladder control.   MUSCULOSKELETAL: No muscle, back pain, joint pain or stiffness.   HEMATOLOGIC: No anemia, bleeding or bruising.   LYMPHATICS: No  enlarged nodes. No history of splenectomy.   PSYCHIATRIC: No history of depression, anxiety, hallucinations.   ENDOCRINOLOGIC: No reports of sweating, cold or heat intolerance. No polyuria or polydipsia.       Objective:     Objective:  Temp:  [97.1 °F (36.2 °C)-97.9 °F (36.6 °C)] 97.9 °F (36.6 °C)  Heart Rate:  [54-72] 63  Resp:  [16-18] 16  BP: ()/(58-98) 96/69  No intake or output data in the 24 hours ending 08/22/18 0733  Body mass index is 22.82 kg/m².  1    08/21/18  1639 08/21/18 2022   Weight: 62.1 kg (137 lb) 62.2 kg (137 lb 2 oz)           Physical Exam:   Physical Exam   Constitutional: She is oriented to person, place, and time. She appears well-developed and well-nourished.   HENT:   Head: Normocephalic.   Eyes: Pupils are equal, round, and reactive to light.   Neck: Normal range of motion. No JVD present. Carotid bruit is not present. No thyromegaly present.   Cardiovascular: Normal rate, regular rhythm, S1 normal, S2 normal, normal heart sounds and intact distal pulses.  Exam reveals no gallop and no friction rub.    No murmur heard.  Pulmonary/Chest: Effort normal and breath sounds normal.   Abdominal: Soft. Bowel sounds are normal.   Musculoskeletal: She exhibits no edema.   Neurological: She is alert and oriented to person, place, and time.   Skin: Skin is warm, dry and intact. No erythema.   Psychiatric: She has a normal mood and affect.   Vitals reviewed.        Lab Review:       Results from last 7 days  Lab Units 08/22/18  0513 08/21/18  1644   SODIUM mmol/L 142 141   POTASSIUM mmol/L 4.5 4.8   CHLORIDE mmol/L 108* 105   CO2 mmol/L 22.8 24.0   BUN mg/dL 19 17   CREATININE mg/dL 0.98 1.01*   GLUCOSE mg/dL 79 85   CALCIUM mg/dL 9.0 9.2         Results from last 7 days  Lab Units 08/22/18  0513 08/21/18  1644   TROPONIN T ng/mL <0.010 <0.010       Results from last 7 days  Lab Units 08/21/18  1644   WBC 10*3/mm3 5.37   HEMOGLOBIN g/dL 14.1   HEMATOCRIT % 41.3   PLATELETS 10*3/mm3 193                        Results from last 7 days  Lab Units 08/21/18  1644   PROBNP pg/mL 112.8           Results from last 7 days  Lab Units 08/22/18  0513   TSH mIU/mL 2.090       Imaging:    Imaging Results (most recent)     Procedure Component Value Units Date/Time    XR Chest 2 View [[id number]] Collected:  08/21/18 1752     Updated:  08/21/18 1824    Narrative:       TWO-VIEW CHEST     HISTORY: Chest pain.     FINDINGS: The lungs are well-expanded and clear and the heart and hilar  structures are normal. There is no acute disease.     This report was finalized on 8/21/2018 6:21 PM by Dr. Ervin Das M.D.             EKG: normal        Baseline:     I personally viewed and interpreted the patient's EKG/Telemetry data.    Assessment:   Assessment/Plan       Active Problems:    Precordial chest pain        Plan:     Stress echo    Thank you for allowing me to participate in the care of Audrey Emerson. Feel free to contact me directly with any further questions or concerns.    Geovany Golden MD  [facility]  08/22/18  7:33 AM  The patient had a stress echocardiogram that was unremarkable for coronary disease.  The probability that she has cardiac dysfunction is unlikely.  It is possible that she is having side effects to her atenolol.  We're going to decrease the dose to 25 mg daily.  She will follow-up by phone in a couple weeks with a progress report.  She will be discharged today.

## 2018-08-23 ENCOUNTER — TELEPHONE (OUTPATIENT)
Dept: CARDIOLOGY | Facility: CLINIC | Age: 68
End: 2018-08-23

## 2018-08-23 NOTE — TELEPHONE ENCOUNTER
Pt is calling stating that she needs a return to work note as  evaluated her in the ER on 8/21 and 8/22. Can you please have a note made for the Pt?

## 2018-08-24 NOTE — TELEPHONE ENCOUNTER
Faxed return to work statement to patient's work attn: Leighton Garza at [phone], confirmation received. [email or web address]

## 2018-10-01 ENCOUNTER — OFFICE VISIT (OUTPATIENT)
Dept: CARDIOLOGY | Facility: CLINIC | Age: 68
End: 2018-10-01

## 2018-10-01 VITALS
SYSTOLIC BLOOD PRESSURE: 132 MMHG | HEART RATE: 81 BPM | WEIGHT: 136 LBS | DIASTOLIC BLOOD PRESSURE: 78 MMHG | BODY MASS INDEX: 22.66 KG/M2 | HEIGHT: 65 IN

## 2018-10-01 DIAGNOSIS — R06.02 SHORTNESS OF BREATH: ICD-10-CM

## 2018-10-01 DIAGNOSIS — R07.2 PRECORDIAL PAIN: Primary | ICD-10-CM

## 2018-10-01 DIAGNOSIS — I10 ESSENTIAL HYPERTENSION: ICD-10-CM

## 2018-10-01 PROCEDURE — 99215 OFFICE O/P EST HI 40 MIN: CPT | Performed by: INTERNAL MEDICINE

## 2018-10-01 PROCEDURE — 93000 ELECTROCARDIOGRAM COMPLETE: CPT | Performed by: INTERNAL MEDICINE

## 2018-10-01 NOTE — PROGRESS NOTES
Subjective:     Encounter Date:10/01/2018      Patient ID: Audrey Emerson is a 68 y.o. female.    Chief Complaint: chest pain, dyspnea, HTN    History of Present Illness    Dear Dr. Schroeder:    I had the pleasure of seeing the patient in cardiac evaluation today.  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  She saw Dr. Golden 20 years ago and an echocardiogram demonstrated some mitral valve prolapse so she was treated with atenolol.    She is very physically fit.  She exercises on a regular basis.  She states that, two months ago, she developed severe exertional dyspnea and chest pain.  She could not even walk down the street without developing these symptoms.  This was a traumatic change for her.    She tried to get in to see me but could not in time so she went to the hospital.  Evaluation there was negative.  She had a stress echocardiogram that was normal.  She had no evidence of mitral valve dysfunction.    Since her return, she says that her symptoms have improved significantly.  She no longer gets the severe chest pain or dyspnea, although it present to a small degree.  She was advised to stop her beta-blocker but, when she did so, she developed some mild hypertension with systolic blood pressures in the 140s mmHg.  She has mild age-related kidney dysfunction but she is concerned about that.    In the past, she had a pulmonary embolism at age 20 thought to be due to birth control.  She also has a family history of pulmonary embolism.      She still works full-time and is back to exercising without limitation.        Review of Systems   All other systems reviewed and are negative.      FH: Negative for heart disease.    Social History   Substance Use Topics   • Smoking status: Never Smoker   • Smokeless tobacco: Not on file   • Alcohol use No         ECG 12 Lead  Date/Time: 10/1/2018 11:09 AM  Performed by: GHADA  MAAME  Authorized by: MAAME URRUTIA   Previous ECG: no previous ECG available  Rhythm: sinus rhythm  BPM: 81  Clinical impression: normal ECG               Objective:     Physical Exam   Constitutional: She is oriented to person, place, and time. She appears well-developed and well-nourished.   HENT:   Head: Normocephalic and atraumatic.   Neck: Normal range of motion. Neck supple.   Cardiovascular: Normal rate, regular rhythm and normal heart sounds.    Pulmonary/Chest: Effort normal and breath sounds normal.   Abdominal: Soft. Bowel sounds are normal.   Musculoskeletal: Normal range of motion.   Neurological: She is alert and oriented to person, place, and time.   Skin: Skin is warm and dry.   Psychiatric: She has a normal mood and affect. Her behavior is normal. Thought content normal.   Vitals reviewed.      Lab Review:       Assessment:          Diagnosis Plan   1. Precordial pain     2. Shortness of breath     3. Essential hypertension            Plan:       It was a pleasure to see your patient in cardiac evaluation today.  She is a pernell, 68-year-old woman with history of pulmonary embolism.  We talked about whether or not this recent event was due to pulmonary embolism.  She would prefer not to have a CT angiogram due to concern about contrast.  Given that her symptoms have resolved, I think this is okay, but I would reconsider it if her symptoms return.    She had a negative stress test, and I think her risk factors for coronary disease are low.  Her symptoms seem to be on the mend.    We spent most of our time talking about treatment of hypertension and preservation of renal function.  I have encouraged her to go back on atenolol 25 mg daily, which she has.  She will also check her blood pressure on a regular basis.    I spent over 50 minutes in direct patient contact.  She will see me again in year or contact me by phone if she has to titrate her medicines.

## 2019-05-07 ENCOUNTER — HOSPITAL ENCOUNTER (OUTPATIENT)
Dept: MAMMOGRAPHY | Facility: HOSPITAL | Age: 69
Discharge: HOME | End: 2019-05-07
Attending: OBSTETRICS & GYNECOLOGY

## 2019-05-22 ENCOUNTER — HOSPITAL ENCOUNTER (OUTPATIENT)
Dept: MAMMOGRAPHY | Facility: HOSPITAL | Age: 69
Discharge: HOME | End: 2019-05-22
Attending: OBSTETRICS & GYNECOLOGY

## 2019-08-07 ENCOUNTER — HOSPITAL ENCOUNTER (OUTPATIENT)
Dept: OTHER | Facility: HOSPITAL | Age: 69
Discharge: HOME | End: 2019-08-07
Attending: FAMILY MEDICINE

## 2019-09-04 ENCOUNTER — HOSPITAL ENCOUNTER (OUTPATIENT)
Dept: OTHER | Facility: HOSPITAL | Age: 69
Discharge: HOME | End: 2019-09-04

## 2019-09-04 LAB
ALBUMIN SERPL-MCNC: 4 G/DL (ref 3.5–5)
ALBUMIN/GLOB SERPL: 1.3 {RATIO} (ref 1.4–2.6)
ALP SERPL-CCNC: 122 U/L (ref 43–160)
ALT SERPL-CCNC: 33 U/L (ref 10–40)
ANION GAP SERPL CALC-SCNC: 18 MMOL/L (ref 8–19)
AST SERPL-CCNC: 35 U/L (ref 15–50)
BASOPHILS # BLD AUTO: 0.03 10*3/UL (ref 0–0.2)
BASOPHILS NFR BLD AUTO: 0.5 % (ref 0–3)
BILIRUB SERPL-MCNC: 0.43 MG/DL (ref 0.2–1.3)
BUN SERPL-MCNC: 16 MG/DL (ref 5–25)
BUN/CREAT SERPL: 16 {RATIO} (ref 6–20)
CALCIUM SERPL-MCNC: 9.2 MG/DL (ref 8.7–10.4)
CHLORIDE SERPL-SCNC: 102 MMOL/L (ref 99–111)
CHOLEST SERPL-MCNC: 177 MG/DL (ref 107–200)
CHOLEST/HDLC SERPL: 2.4 {RATIO} (ref 3–6)
CONV ABS IMM GRAN: 0.02 10*3/UL (ref 0–0.2)
CONV CO2: 23 MMOL/L (ref 22–32)
CONV IMMATURE GRAN: 0.4 % (ref 0–1.8)
CONV TOTAL PROTEIN: 7.1 G/DL (ref 6.3–8.2)
CREAT UR-MCNC: 1.01 MG/DL (ref 0.5–0.9)
DEPRECATED RDW RBC AUTO: 46.5 FL (ref 36.4–46.3)
EOSINOPHIL # BLD AUTO: 0.11 10*3/UL (ref 0–0.7)
EOSINOPHIL # BLD AUTO: 2 % (ref 0–7)
ERYTHROCYTE [DISTWIDTH] IN BLOOD BY AUTOMATED COUNT: 13.6 % (ref 11.7–14.4)
GFR SERPLBLD BASED ON 1.73 SQ M-ARVRAT: 57 ML/MIN/{1.73_M2}
GLOBULIN UR ELPH-MCNC: 3.1 G/DL (ref 2–3.5)
GLUCOSE SERPL-MCNC: 87 MG/DL (ref 65–99)
HCT VFR BLD AUTO: 41.6 % (ref 37–47)
HDLC SERPL-MCNC: 74 MG/DL (ref 40–60)
HGB BLD-MCNC: 14.2 G/DL (ref 12–16)
LDLC SERPL CALC-MCNC: 89 MG/DL (ref 70–100)
LYMPHOCYTES # BLD AUTO: 1.66 10*3/UL (ref 1–5)
LYMPHOCYTES NFR BLD AUTO: 29.9 % (ref 20–45)
MCH RBC QN AUTO: 31.3 PG (ref 27–31)
MCHC RBC AUTO-ENTMCNC: 34.1 G/DL (ref 33–37)
MCV RBC AUTO: 91.8 FL (ref 81–99)
MONOCYTES # BLD AUTO: 0.53 10*3/UL (ref 0.2–1.2)
MONOCYTES NFR BLD AUTO: 9.5 % (ref 3–10)
NEUTROPHILS # BLD AUTO: 3.2 10*3/UL (ref 2–8)
NEUTROPHILS NFR BLD AUTO: 57.7 % (ref 30–85)
NRBC CBCN: 0 % (ref 0–0.7)
OSMOLALITY SERPL CALC.SUM OF ELEC: 287 MOSM/KG (ref 273–304)
PLATELET # BLD AUTO: 197 10*3/UL (ref 130–400)
PMV BLD AUTO: 11.8 FL (ref 9.4–12.3)
POTASSIUM SERPL-SCNC: 4.5 MMOL/L (ref 3.5–5.3)
RBC # BLD AUTO: 4.53 10*6/UL (ref 4.2–5.4)
SODIUM SERPL-SCNC: 138 MMOL/L (ref 135–147)
TRIGL SERPL-MCNC: 71 MG/DL (ref 40–150)
TSH SERPL-ACNC: 2.62 M[IU]/L (ref 0.27–4.2)
VLDLC SERPL-MCNC: 14 MG/DL (ref 5–37)
WBC # BLD AUTO: 5.55 10*3/UL (ref 4.8–10.8)

## 2019-12-18 ENCOUNTER — HOSPITAL ENCOUNTER (OUTPATIENT)
Dept: GENERAL RADIOLOGY | Facility: HOSPITAL | Age: 69
Discharge: HOME | End: 2019-12-18
Attending: PODIATRIST

## 2020-01-10 ENCOUNTER — OFFICE VISIT CONVERTED (OUTPATIENT)
Dept: PODIATRY | Facility: CLINIC | Age: 70
End: 2020-01-10
Attending: PODIATRIST

## 2020-01-31 ENCOUNTER — OFFICE VISIT CONVERTED (OUTPATIENT)
Dept: PODIATRY | Facility: CLINIC | Age: 70
End: 2020-01-31
Attending: PODIATRIST

## 2020-02-05 ENCOUNTER — HOSPITAL ENCOUNTER (OUTPATIENT)
Dept: OTHER | Facility: HOSPITAL | Age: 70
Discharge: HOME | End: 2020-02-05
Attending: FAMILY MEDICINE

## 2020-02-05 LAB
ANION GAP SERPL CALC-SCNC: 17 MMOL/L (ref 8–19)
BUN SERPL-MCNC: 18 MG/DL (ref 5–25)
BUN/CREAT SERPL: 20 {RATIO} (ref 6–20)
CALCIUM SERPL-MCNC: 9.2 MG/DL (ref 8.7–10.4)
CHLORIDE SERPL-SCNC: 103 MMOL/L (ref 99–111)
CONV CO2: 24 MMOL/L (ref 22–32)
CREAT UR-MCNC: 0.92 MG/DL (ref 0.5–0.9)
GFR SERPLBLD BASED ON 1.73 SQ M-ARVRAT: >60 ML/MIN/{1.73_M2}
GLUCOSE SERPL-MCNC: 85 MG/DL (ref 65–99)
OSMOLALITY SERPL CALC.SUM OF ELEC: 289 MOSM/KG (ref 273–304)
POTASSIUM SERPL-SCNC: 5 MMOL/L (ref 3.5–5.3)
SODIUM SERPL-SCNC: 139 MMOL/L (ref 135–147)

## 2020-06-16 ENCOUNTER — OFFICE VISIT CONVERTED (OUTPATIENT)
Dept: PODIATRY | Facility: CLINIC | Age: 70
End: 2020-06-16
Attending: PODIATRIST

## 2020-06-22 ENCOUNTER — OUTSIDE FACILITY SERVICE (OUTPATIENT)
Dept: SLEEP MEDICINE | Facility: HOSPITAL | Age: 70
End: 2020-06-22

## 2020-06-22 ENCOUNTER — HOSPITAL ENCOUNTER (OUTPATIENT)
Dept: SLEEP MEDICINE | Facility: HOSPITAL | Age: 70
Discharge: HOME | End: 2020-06-22
Attending: INTERNAL MEDICINE

## 2020-06-22 PROCEDURE — 99204 OFFICE O/P NEW MOD 45 MIN: CPT | Performed by: INTERNAL MEDICINE

## 2020-07-07 ENCOUNTER — HOSPITAL ENCOUNTER (OUTPATIENT)
Dept: SLEEP MEDICINE | Facility: HOSPITAL | Age: 70
Discharge: HOME | End: 2020-07-07
Attending: INTERNAL MEDICINE

## 2020-07-13 ENCOUNTER — OUTSIDE FACILITY SERVICE (OUTPATIENT)
Dept: SLEEP MEDICINE | Facility: HOSPITAL | Age: 70
End: 2020-07-13

## 2020-07-13 PROCEDURE — 95806 SLEEP STUDY UNATT&RESP EFFT: CPT | Performed by: INTERNAL MEDICINE

## 2020-08-07 ENCOUNTER — HOSPITAL ENCOUNTER (OUTPATIENT)
Dept: MAMMOGRAPHY | Facility: HOSPITAL | Age: 70
Discharge: HOME | End: 2020-08-07
Attending: OBSTETRICS & GYNECOLOGY

## 2020-09-14 ENCOUNTER — OUTSIDE FACILITY SERVICE (OUTPATIENT)
Dept: SLEEP MEDICINE | Facility: HOSPITAL | Age: 70
End: 2020-09-14

## 2020-09-14 ENCOUNTER — HOSPITAL ENCOUNTER (OUTPATIENT)
Dept: SLEEP MEDICINE | Facility: HOSPITAL | Age: 70
Discharge: HOME | End: 2020-09-14
Attending: INTERNAL MEDICINE

## 2020-09-14 PROCEDURE — 99213 OFFICE O/P EST LOW 20 MIN: CPT | Performed by: INTERNAL MEDICINE

## 2020-10-01 ENCOUNTER — HOSPITAL ENCOUNTER (OUTPATIENT)
Dept: OTHER | Facility: HOSPITAL | Age: 70
Discharge: HOME | End: 2020-10-01

## 2020-10-01 LAB
ALBUMIN SERPL-MCNC: 3.9 G/DL (ref 3.5–5)
ALBUMIN/GLOB SERPL: 1.4 {RATIO} (ref 1.4–2.6)
ALP SERPL-CCNC: 113 U/L (ref 43–160)
ALT SERPL-CCNC: 23 U/L (ref 10–40)
ANION GAP SERPL CALC-SCNC: 16 MMOL/L (ref 8–19)
AST SERPL-CCNC: 31 U/L (ref 15–50)
BASOPHILS # BLD AUTO: 0.02 10*3/UL (ref 0–0.2)
BASOPHILS NFR BLD AUTO: 0.4 % (ref 0–3)
BILIRUB SERPL-MCNC: 0.53 MG/DL (ref 0.2–1.3)
BUN SERPL-MCNC: 19 MG/DL (ref 5–25)
BUN/CREAT SERPL: 19 {RATIO} (ref 6–20)
CALCIUM SERPL-MCNC: 9.3 MG/DL (ref 8.7–10.4)
CHLORIDE SERPL-SCNC: 103 MMOL/L (ref 99–111)
CHOLEST SERPL-MCNC: 172 MG/DL (ref 107–200)
CHOLEST/HDLC SERPL: 2.6 {RATIO} (ref 3–6)
CONV ABS IMM GRAN: 0.01 10*3/UL (ref 0–0.2)
CONV CO2: 24 MMOL/L (ref 22–32)
CONV IMMATURE GRAN: 0.2 % (ref 0–1.8)
CONV TOTAL PROTEIN: 6.7 G/DL (ref 6.3–8.2)
CREAT UR-MCNC: 0.98 MG/DL (ref 0.5–0.9)
DEPRECATED RDW RBC AUTO: 43.7 FL (ref 36.4–46.3)
EOSINOPHIL # BLD AUTO: 0.24 10*3/UL (ref 0–0.7)
EOSINOPHIL # BLD AUTO: 4.6 % (ref 0–7)
ERYTHROCYTE [DISTWIDTH] IN BLOOD BY AUTOMATED COUNT: 13.1 % (ref 11.7–14.4)
GFR SERPLBLD BASED ON 1.73 SQ M-ARVRAT: 59 ML/MIN/{1.73_M2}
GLOBULIN UR ELPH-MCNC: 2.8 G/DL (ref 2–3.5)
GLUCOSE SERPL-MCNC: 85 MG/DL (ref 65–99)
HCT VFR BLD AUTO: 41.8 % (ref 37–47)
HDLC SERPL-MCNC: 65 MG/DL (ref 40–60)
HGB BLD-MCNC: 14.3 G/DL (ref 12–16)
LDLC SERPL CALC-MCNC: 84 MG/DL (ref 70–100)
LYMPHOCYTES # BLD AUTO: 1.74 10*3/UL (ref 1–5)
LYMPHOCYTES NFR BLD AUTO: 33.2 % (ref 20–45)
MCH RBC QN AUTO: 31 PG (ref 27–31)
MCHC RBC AUTO-ENTMCNC: 34.2 G/DL (ref 33–37)
MCV RBC AUTO: 90.7 FL (ref 81–99)
MONOCYTES # BLD AUTO: 0.61 10*3/UL (ref 0.2–1.2)
MONOCYTES NFR BLD AUTO: 11.6 % (ref 3–10)
NEUTROPHILS # BLD AUTO: 2.62 10*3/UL (ref 2–8)
NEUTROPHILS NFR BLD AUTO: 50 % (ref 30–85)
NRBC CBCN: 0 % (ref 0–0.7)
OSMOLALITY SERPL CALC.SUM OF ELEC: 290 MOSM/KG (ref 273–304)
PLATELET # BLD AUTO: 155 10*3/UL (ref 130–400)
PMV BLD AUTO: 11.8 FL (ref 9.4–12.3)
POTASSIUM SERPL-SCNC: 4.3 MMOL/L (ref 3.5–5.3)
RBC # BLD AUTO: 4.61 10*6/UL (ref 4.2–5.4)
SODIUM SERPL-SCNC: 139 MMOL/L (ref 135–147)
TRIGL SERPL-MCNC: 114 MG/DL (ref 40–150)
TSH SERPL-ACNC: 1.89 M[IU]/L (ref 0.27–4.2)
VLDLC SERPL-MCNC: 23 MG/DL (ref 5–37)
WBC # BLD AUTO: 5.24 10*3/UL (ref 4.8–10.8)

## 2021-05-03 ENCOUNTER — HOSPITAL ENCOUNTER (OUTPATIENT)
Dept: URGENT CARE | Facility: CLINIC | Age: 71
Discharge: HOME | End: 2021-05-03
Attending: EMERGENCY MEDICINE

## 2021-05-05 LAB — SARS-COV-2 RNA SPEC QL NAA+PROBE: NOT DETECTED

## 2021-05-13 NOTE — PROGRESS NOTES
Progress Note      Patient Name: Audrey Emerson   Patient ID: [id number]   Sex: Female   YOB: 1950    Primary Care Provider: Mykel Schroeder MD   Referring Provider: Pedro Golden DPM    Visit Date: June 16, 2020    Provider: Pedro Golden DPM   Location: [facility]   Location Address: [address] [place] [address]  [id number]   Location Phone: [phone]          Chief Complaint  · Right Foot Pain      History Of Present Illness  Audrey Emerson is a 69 year old /White female who presents to the [facility] today a follow up for:      New, Established, New Problem:  new  Location:  Right 1st metatarsal phalangeal joint  Duration:  January 2020  Onset:  insidious  Nature:  sore  Stable, worsening, improving:  worsening    Aggravating factors:   Patient relates pain is aggravated by shoe gear and ambulation. Especially with golfing.  Previous Treatment:  changes in shoes.    Patient denies any fevers, chills, nausea, vomiting, shortness of breathe, nor any other constitutional signs nor symptoms.    Patient relates no medical changes since their last visit.           Past Medical History  Arthritis; Corns and callus; Foot pain, left; Foot pain, right; Gastrointestinal ulcer; Mitral Valve Disorders; Osteoporosis; Seasonal allergies         Past Surgical History  Breast biopsy, both breasts; C-sections; Cesarian Section; Cholecystectomy; Colonoscopy; Gallbladder; Hysterectomy; Joint Surgery; Myomectomy; Salpingo-oophorectomy; shoulder repair         Medication List  Aleve 220 mg oral tablet; aspirin 81 mg Oral tablet,delayed release (DR/EC); atenolol 50 mg Oral tablet; Calcium 500 with Vitamin D Oral 600-1200 units; estradiol 1 mg Oral tablet; folic acid oral; multivitamin oral tablet; Prenatal Vitamin 27 mg iron- 0.8 mg oral tablet; Vitamin D3 1,000 unit (25 mcg) oral tablet; Voltaren 1 % topical gel         Allergy List  gabapentin;  "Talwin       Allergies Reconciled  Family Medical History  Stroke; Cancer, Unspecified; - No Family History of Colorectal Cancer; Family history of breast cancer; Family history of certain chronic disabling diseases; arthritis; Osteoporosis; Family history of heart disease         Social History  Alcohol (Former); Alcohol Use (Never); lives with spouse; .; Recreational Drug Use (Never); Tobacco (Never); Working         Review of Systems  · Constitutional  o Denies  o : fatigue, night sweats  · Eyes  o Denies  o : double vision, blurred vision  · HENT  o Denies  o : vertigo, recent head injury  · Cardiovascular  o Denies  o : chest pain, irregular heart beats  · Respiratory  o Denies  o : shortness of breath, productive cough  · Gastrointestinal  o Denies  o : nausea, vomiting  · Genitourinary  o Denies  o : dysuria, urinary retention  · Integument  o Denies  o : hair growth change, new skin lesions  · Neurologic  o Denies  o : altered mental status, seizures  · Musculoskeletal  o * See HPI  · Endocrine  o Denies  o : cold intolerance, heat intolerance  · Heme-Lymph  o Denies  o : petechiae, lymph node enlargement or tenderness  · Allergic-Immunologic  o Denies  o : frequent illnesses      Vitals  Date Time BP Position Site L\R Cuff Size HR RR TEMP (F) WT  HT  BMI kg/m2 BSA m2 O2 Sat        06/16/2020 07:31 /71 Sitting    60 - R  98 141lbs 0oz 5'  4.5\" 23.83 1.71 98 %          Physical Examination  · Constitutional  o Appearance  o : well developed, well-nourished, no obvious deformities present  · Cardiovascular  o Peripheral Vascular System  o :   § Pedal Pulses  § : pulses 2 + and symmetrical  § Extremities  § : no edema in lower extremities  · Musculoskeletal  o General  o :   § General Musculoskeletal  § : Lower extremity muscle and strength and range of motion is equal and symmetrical bilaterally. The knees are noted to be normal in alignment. Right Medial deviation of the first metatarsal with " associated lateral deviation of the hallux at the metatarsal phalangeal joint. No signs of edema, erythema, lymphangitis, nor signs of infection.   · Skin and Subcutaneous Tissue  o General Inspection  o : Skin is noted to have normal texture and turgor, with no excrescences noted.   o Digits and Nails  o : The toenails are noted to be without disese.  · Neurologic  o Sensation  o : Epicritic sensations intact bilaterally.          Assessment  · Foot pain, right     729.5/M79.671  · Hallux abducto valgus, right     735.0/M20.11      Plan  · Medications  o Medications have been Reconciled  o Transition of Care or Provider Policy  · Instructions  o Follow Up PRN.  o Discuss Findings: I have discussed the findings of this evaluation with the patient. The discussion included a complete verbal explanation of any changes in the examination results, diagnosis, and the current treatment plan. A schedule for future care needs was explained. If any questions should arise after returning home, I have encouraged the patient to feel free to contact Dr. Golden. The patient states understanding and agreement with this plan.  o Right Reverdin-Green-Miles and Modified Leyva Bunionectomies. Upon discussion of surgical correction, post-operative requirements along with risk and benefits of the surgery, the patient states they do not want to pursue surgery at this time.  o Padding dispensed to off-weight pressure area. Patient was instructed on proper use of the padding. They state understanding and agreement with using the dispensed padding.  o Discussed proper shoegear for the patient's feet and medical condition. Pt states understanding and agreement with this plan.  o Electronically Identified Patient Education Materials Provided Electronically  · Disposition  o Call or Return if symptoms worsen or persist.            Electronically Signed by: Pedro Golden DPM -Author on June 16, 2020 08:04:12 AM

## 2021-05-15 VITALS
BODY MASS INDEX: 24.07 KG/M2 | HEART RATE: 68 BPM | WEIGHT: 141 LBS | OXYGEN SATURATION: 98 % | SYSTOLIC BLOOD PRESSURE: 133 MMHG | DIASTOLIC BLOOD PRESSURE: 71 MMHG | HEIGHT: 64 IN

## 2021-05-15 VITALS
DIASTOLIC BLOOD PRESSURE: 71 MMHG | BODY MASS INDEX: 24.07 KG/M2 | TEMPERATURE: 98 F | SYSTOLIC BLOOD PRESSURE: 140 MMHG | HEART RATE: 60 BPM | OXYGEN SATURATION: 98 % | HEIGHT: 64 IN | WEIGHT: 141 LBS

## 2021-05-15 VITALS
HEIGHT: 64 IN | WEIGHT: 139 LBS | DIASTOLIC BLOOD PRESSURE: 71 MMHG | SYSTOLIC BLOOD PRESSURE: 129 MMHG | OXYGEN SATURATION: 98 % | HEART RATE: 63 BPM | BODY MASS INDEX: 23.73 KG/M2

## 2021-05-16 VITALS — HEART RATE: 60 BPM | OXYGEN SATURATION: 98 % | BODY MASS INDEX: 23.32 KG/M2 | HEIGHT: 65 IN | WEIGHT: 140 LBS

## 2021-09-22 ENCOUNTER — OFFICE VISIT (OUTPATIENT)
Dept: SLEEP MEDICINE | Facility: HOSPITAL | Age: 71
End: 2021-09-22

## 2021-09-22 VITALS
WEIGHT: 140 LBS | BODY MASS INDEX: 23.32 KG/M2 | OXYGEN SATURATION: 97 % | HEART RATE: 55 BPM | SYSTOLIC BLOOD PRESSURE: 129 MMHG | TEMPERATURE: 96 F | HEIGHT: 65 IN | DIASTOLIC BLOOD PRESSURE: 75 MMHG

## 2021-09-22 DIAGNOSIS — G47.33 OSA ON CPAP: Primary | ICD-10-CM

## 2021-09-22 DIAGNOSIS — Z99.89 OSA ON CPAP: Primary | ICD-10-CM

## 2021-09-22 PROCEDURE — 99213 OFFICE O/P EST LOW 20 MIN: CPT | Performed by: INTERNAL MEDICINE

## 2021-09-22 PROCEDURE — G0463 HOSPITAL OUTPT CLINIC VISIT: HCPCS

## 2021-09-22 NOTE — PROGRESS NOTES
"  [facility]  [address] [place] [id number]  Phone: [phone]  Fax: [phone]      SLEEP CLINIC FOLLOW UP PROGRESS NOTE.    Audrey Emerson  1950  71 y.o.  female      PCP: Mathew Mehta DO      Date of visit: 9/22/2021    Chief Complaint   Patient presents with   • Sleep Apnea       HPI:  This is a 71 y.o. years old patient who has a history of obstructive sleep apnea is here for  the annual compliance follow-up.  Sleep apnea is moderate in severity with a AHI of 15/hr. Patient is using positive airway pressure therapy with auto CPAP and the symptoms of snoring, non-restorative sleep and daytime excessive sleepiness have improved significantly on the therapy. Normally goes to bed at 10 PM and wakes up at 6 AM.  The patient wakes up 3 time(s) during the night and has no problem going back to sleep.  Feels refreshed after waking up.  Patient also denies headaches and nasal congestion.   Last known but she has retired she used to be a nurse at the [employer]    Medications and allergies are reviewed by me and documented in the encounter.     SOCIAL ( habits pertaining to sleep medicine)  History tobacco use:No   History of alcohol use: 0 per week  Caffeine use: 2     REVIEW OF SYSTEMS:   [place] Sleepiness Scale :Total score: 1   Nasal congestion:No   Dry mouth/nose:Yes   Post nasal drip; No   Acid reflux/Heartburn:No   Abd bloating:No   Morning headache:Yes   Anxiety:No   Depression:No    PHYSICAL EXAMINATION:  CONSTITUTIONAL:  Vitals:    09/22/21 1100   BP: 129/75   Pulse: 55   Temp: 96 °F (35.6 °C)   SpO2: 97%   Weight: 63.5 kg (140 lb)   Height: 165.1 cm (65\")    Body mass index is 23.3 kg/m².   NOSE: nasal passages are clear, no nasal polyps, septum in the midline.  THROAT: throat is clear, oral airway Mallampati class 3  RESP SYSTEM: Breath sounds are normal, no wheezes or crackles  CARDIOVASULAR: Heart rate is regular without murmur. No edema      Data " reviewed:  The Smart card downloaded on 9/22/2021 has been reviewed independently by me for compliance and discussed the data with the patient.   Compliance; 100%  More than 4 hr use, 97%  Average use of the device 7 hours and 3 minutes per night  Residual AHI: Three-point /hr (goal < 5.0 /hr)  Mask type: Nasal pillows  DME: [employer]    ASSESSMENT AND PLAN:  · Obstructive sleep apnea ( G 47.33).  The symptoms of sleep apnea have improved with the device and the treatment.  Patient's compliance with the device is excellent for treatment of sleep apnea.  I have independently reviewed the smart card down load and discussed with the patient the download data and encouarged the patient to continue to use the device.The residual AHI is acceptable. The device is benefiting the patient and the device is medically necessary.  Without proper control of sleep apnea and good compliance there is a increased risk for hypertension, diabetes mellitus and nonrestorative sleep with hypersomnia which can increase risk for motor vehicle accidents.  Untreated sleep apnea is also a risk factor for development of atrial fibrillation, pulmonary hypertension and stroke. The patient is also instructed to get the supplies from the [employer] and and change them on a regular basis.  A prescription for supplies has been sent to the [employer] [facility].  I have also discussed the good sleep hygiene habits and adequate amount of sleep needed for good health.  · Return in about 1 year (around 9/22/2022) for Annual visit with smartcard download. . Patient's questions were answered.        Kenzie Alvarez MD  Sleep Medicine.  Medical Director, [facility]  9/22/2021 ,

## 2021-09-23 ENCOUNTER — OFFICE VISIT (OUTPATIENT)
Dept: FAMILY MEDICINE CLINIC | Facility: CLINIC | Age: 71
End: 2021-09-23

## 2021-09-23 VITALS
BODY MASS INDEX: 23.32 KG/M2 | TEMPERATURE: 98 F | DIASTOLIC BLOOD PRESSURE: 72 MMHG | HEIGHT: 65 IN | HEART RATE: 62 BPM | OXYGEN SATURATION: 97 % | WEIGHT: 140 LBS | SYSTOLIC BLOOD PRESSURE: 133 MMHG

## 2021-09-23 DIAGNOSIS — Z12.31 ENCOUNTER FOR SCREENING MAMMOGRAM FOR MALIGNANT NEOPLASM OF BREAST: ICD-10-CM

## 2021-09-23 DIAGNOSIS — Z00.00 ENCOUNTER FOR ANNUAL PHYSICAL EXAM: ICD-10-CM

## 2021-09-23 DIAGNOSIS — G25.81 RESTLESS LEG: ICD-10-CM

## 2021-09-23 DIAGNOSIS — Z99.89 OSA ON CPAP: ICD-10-CM

## 2021-09-23 DIAGNOSIS — Q79.60 EHLERS-DANLOS DISEASE: ICD-10-CM

## 2021-09-23 DIAGNOSIS — M81.0 AGE-RELATED OSTEOPOROSIS WITHOUT CURRENT PATHOLOGICAL FRACTURE: Primary | ICD-10-CM

## 2021-09-23 DIAGNOSIS — M19.90 ARTHRITIS: ICD-10-CM

## 2021-09-23 DIAGNOSIS — G47.33 OSA ON CPAP: ICD-10-CM

## 2021-09-23 LAB
ALBUMIN SERPL-MCNC: 4.5 G/DL (ref 3.5–5.2)
ALBUMIN/GLOB SERPL: 1.5 G/DL
ALP SERPL-CCNC: 96 U/L (ref 39–117)
ALT SERPL W P-5'-P-CCNC: 19 U/L (ref 1–33)
ANION GAP SERPL CALCULATED.3IONS-SCNC: 10.1 MMOL/L (ref 5–15)
AST SERPL-CCNC: 25 U/L (ref 1–32)
BASOPHILS # BLD AUTO: 0.04 10*3/MM3 (ref 0–0.2)
BASOPHILS NFR BLD AUTO: 0.8 % (ref 0–1.5)
BILIRUB SERPL-MCNC: 0.6 MG/DL (ref 0–1.2)
BUN SERPL-MCNC: 17 MG/DL (ref 8–23)
BUN/CREAT SERPL: 18.7 (ref 7–25)
CALCIUM SPEC-SCNC: 9.6 MG/DL (ref 8.6–10.5)
CHLORIDE SERPL-SCNC: 102 MMOL/L (ref 98–107)
CHOLEST SERPL-MCNC: 180 MG/DL (ref 0–200)
CO2 SERPL-SCNC: 25.9 MMOL/L (ref 22–29)
CREAT SERPL-MCNC: 0.91 MG/DL (ref 0.57–1)
DEPRECATED RDW RBC AUTO: 39 FL (ref 37–54)
EOSINOPHIL # BLD AUTO: 0.09 10*3/MM3 (ref 0–0.4)
EOSINOPHIL NFR BLD AUTO: 1.7 % (ref 0.3–6.2)
ERYTHROCYTE [DISTWIDTH] IN BLOOD BY AUTOMATED COUNT: 12 % (ref 12.3–15.4)
GFR SERPL CREATININE-BSD FRML MDRD: 61 ML/MIN/1.73
GLOBULIN UR ELPH-MCNC: 3.1 GM/DL
GLUCOSE SERPL-MCNC: 90 MG/DL (ref 65–99)
HCT VFR BLD AUTO: 43.4 % (ref 34–46.6)
HDLC SERPL-MCNC: 63 MG/DL (ref 40–60)
HGB BLD-MCNC: 15.3 G/DL (ref 12–15.9)
IMM GRANULOCYTES # BLD AUTO: 0.01 10*3/MM3 (ref 0–0.05)
IMM GRANULOCYTES NFR BLD AUTO: 0.2 % (ref 0–0.5)
LDLC SERPL CALC-MCNC: 102 MG/DL (ref 0–100)
LDLC/HDLC SERPL: 1.6 {RATIO}
LYMPHOCYTES # BLD AUTO: 1.48 10*3/MM3 (ref 0.7–3.1)
LYMPHOCYTES NFR BLD AUTO: 28.5 % (ref 19.6–45.3)
MCH RBC QN AUTO: 31.9 PG (ref 26.6–33)
MCHC RBC AUTO-ENTMCNC: 35.3 G/DL (ref 31.5–35.7)
MCV RBC AUTO: 90.6 FL (ref 79–97)
MONOCYTES # BLD AUTO: 0.41 10*3/MM3 (ref 0.1–0.9)
MONOCYTES NFR BLD AUTO: 7.9 % (ref 5–12)
NEUTROPHILS NFR BLD AUTO: 3.16 10*3/MM3 (ref 1.7–7)
NEUTROPHILS NFR BLD AUTO: 60.9 % (ref 42.7–76)
NRBC BLD AUTO-RTO: 0 /100 WBC (ref 0–0.2)
PLATELET # BLD AUTO: 195 10*3/MM3 (ref 140–450)
PMV BLD AUTO: 12.4 FL (ref 6–12)
POTASSIUM SERPL-SCNC: 4.2 MMOL/L (ref 3.5–5.2)
PROT SERPL-MCNC: 7.6 G/DL (ref 6–8.5)
RBC # BLD AUTO: 4.79 10*6/MM3 (ref 3.77–5.28)
SODIUM SERPL-SCNC: 138 MMOL/L (ref 136–145)
TRIGL SERPL-MCNC: 81 MG/DL (ref 0–150)
TSH SERPL DL<=0.05 MIU/L-ACNC: 1.18 UIU/ML (ref 0.27–4.2)
VLDLC SERPL-MCNC: 15 MG/DL (ref 5–40)
WBC # BLD AUTO: 5.19 10*3/MM3 (ref 3.4–10.8)

## 2021-09-23 PROCEDURE — 84443 ASSAY THYROID STIM HORMONE: CPT | Performed by: FAMILY MEDICINE

## 2021-09-23 PROCEDURE — 36415 COLL VENOUS BLD VENIPUNCTURE: CPT | Performed by: FAMILY MEDICINE

## 2021-09-23 PROCEDURE — 80061 LIPID PANEL: CPT | Performed by: FAMILY MEDICINE

## 2021-09-23 PROCEDURE — 85025 COMPLETE CBC W/AUTO DIFF WBC: CPT | Performed by: FAMILY MEDICINE

## 2021-09-23 PROCEDURE — 99387 INIT PM E/M NEW PAT 65+ YRS: CPT | Performed by: FAMILY MEDICINE

## 2021-09-23 PROCEDURE — 80053 COMPREHEN METABOLIC PANEL: CPT | Performed by: FAMILY MEDICINE

## 2021-09-23 RX ORDER — ESTRADIOL 1 MG/1
TABLET ORAL
COMMUNITY
End: 2021-09-23 | Stop reason: SDUPTHER

## 2021-09-23 RX ORDER — LANOLIN ALCOHOL/MO/W.PET/CERES
1000 CREAM (GRAM) TOPICAL DAILY
COMMUNITY

## 2021-09-23 RX ORDER — ASPIRIN 81 MG/1
TABLET ORAL
COMMUNITY

## 2021-09-23 RX ORDER — MULTIVIT WITH MINERALS/LUTEIN
1000 TABLET ORAL DAILY
COMMUNITY

## 2021-09-23 RX ORDER — FOLIC ACID 1 MG/1
TABLET ORAL
COMMUNITY

## 2021-09-23 RX ORDER — NAPROXEN SODIUM 220 MG
TABLET ORAL
COMMUNITY

## 2021-09-23 RX ORDER — ROPINIROLE 1 MG/1
1 TABLET, FILM COATED ORAL
COMMUNITY
Start: 2021-07-21 | End: 2021-09-23 | Stop reason: SDUPTHER

## 2021-09-23 RX ORDER — ATENOLOL 25 MG/1
25 TABLET ORAL DAILY
COMMUNITY
Start: 2021-07-05 | End: 2021-09-23 | Stop reason: SDUPTHER

## 2021-09-23 RX ORDER — ROPINIROLE 1 MG/1
1 TABLET, FILM COATED ORAL
Qty: 90 TABLET | Refills: 3 | Status: SHIPPED | OUTPATIENT
Start: 2021-09-23 | End: 2022-09-27 | Stop reason: SDUPTHER

## 2021-09-23 RX ORDER — MELATONIN: COMMUNITY

## 2021-09-23 RX ORDER — DICLOFENAC SODIUM AND MISOPROSTOL 75; 200 MG/1; UG/1
TABLET, DELAYED RELEASE ORAL
COMMUNITY
End: 2021-09-23

## 2021-09-23 RX ORDER — ATENOLOL 25 MG/1
25 TABLET ORAL DAILY
Qty: 90 TABLET | Refills: 3 | Status: SHIPPED | OUTPATIENT
Start: 2021-09-23 | End: 2021-12-22

## 2021-09-23 RX ORDER — ESTRADIOL 1 MG/1
1 TABLET ORAL DAILY
Qty: 90 TABLET | Refills: 3 | Status: SHIPPED | OUTPATIENT
Start: 2021-09-23 | End: 2022-09-27 | Stop reason: SDUPTHER

## 2021-09-23 NOTE — ASSESSMENT & PLAN NOTE
Her last DEXA scan was improved.  We will give her a few more years before repeating it again in the meantime we will continue her current plan of care.

## 2021-09-23 NOTE — ASSESSMENT & PLAN NOTE
She has lots of osteoarthritis in her jaw neck hands and feet.  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  Currently she just uses 1 Aleve a day which allows her to remain physically active.

## 2021-09-23 NOTE — PROGRESS NOTES
Chief Complaint   Patient presents with   • Establish Care        Subjective     Audrey Emerson  has a past medical history of Arthritis, Corns and callus, Jhonny-Danlos disease, Foot pain, bilateral (12/17/2019), Gastrointestinal ulcer, Mitral valve disorder, Osteoporosis, Restless leg, and Seasonal allergies.    Establish care-she had previously seen Dr. Schroeder for years.  Dr. Schroeder is since retired and needs to establish care with a new PCP.    Osteoporosis-her most recent DEXA scan 2 years ago was within normal limits.  Her prior DEXA scan 5 years ago showed osteopenia.  Thus in that period of time her bone density has improved with calcium vitamin D and estrogen.    Osteoarthritis-she has multiple areas of osteoarthritis most noted to TMJ as well as her feet and cervical spine.  Currently she uses Aleve once daily.    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      PHQ-2 Depression Screening  Little interest or pleasure in doing things? 0   Feeling down, depressed, or hopeless? 0   PHQ-2 Total Score 0   PHQ-9 Depression Screening  Little interest or pleasure in doing things? 0   Feeling down, depressed, or hopeless? 0   Trouble falling or staying asleep, or sleeping too much?     Feeling tired or having little energy?     Poor appetite or overeating?     Feeling bad about yourself - or that you are a failure or have let yourself or your family down?     Trouble concentrating on things, such as reading the newspaper or watching television?     Moving or speaking so slowly that other people could have noticed? Or the opposite - being so fidgety or restless that you have been moving around a lot more than usual?     Thoughts that you would be better off dead, or of hurting yourself in some way?     PHQ-9 Total Score 0   If you checked off any problems, how difficult have these  problems made it for you to do your work, take care of things at home, or get along with other people?       Allergies   Allergen Reactions   • Gabapentin Swelling   • Pentazocine Hallucinations       Prior to Admission medications    Medication Sig Start Date End Date Taking? Authorizing Provider   aspirin (aspirin) 81 MG EC tablet aspirin 81 mg oral tablet,delayed release (DR/EC) take 1 tablet (81 mg) by oral route once daily   Active   Yes Dimas Lopez MD   atenolol (TENORMIN) 25 MG tablet Take 25 mg by mouth Daily. 7/5/21  Yes Dimas Lopez MD   Calcium Carb-Cholecalciferol 600-500 MG-UNIT capsule Calcium 500 with Vitamin D Oral 600-1200 units one daily   Active   Yes Dimas Lopez MD   cholecalciferol (Cholecalciferol) 25 MCG (1000 UT) tablet Vitamin D3 1,000 unit (25 mcg) oral tablet take 1 tablet by oral route daily   Active   Yes Dimas Lpoez MD   estradiol (ESTRACE) 1 MG tablet Take 1 mg by mouth Daily.   Yes Dimas Lopez MD   folic acid (FOLVITE) 1 MG tablet folic acid oral take 1  by oral route daily   Active   Yes Dimas Lopez MD   Multiple Vitamins-Minerals (MULTIVITAMIN ADULT EXTRA C PO) multivitamin Oral tablet take 1 tablet by oral route daily   Suspended   Yes Dimas Lopez MD   naproxen sodium (Aleve) 220 MG tablet Aleve 220 mg oral tablet take 1 tablet by oral route daily   Active   Yes Dimas Lopez MD   rOPINIRole (REQUIP) 1 MG tablet Take 1 mg by mouth every night at bedtime. 7/21/21  Yes Dimas Lopez MD   vitamin B-12 (CYANOCOBALAMIN) 1000 MCG tablet Take 1,000 mcg by mouth Daily.   Yes Dimas Lopez MD   vitamin C (ASCORBIC ACID) 250 MG tablet Take 1,000 mg by mouth Daily.   Yes Dimas Lopez MD   Zinc 50 MG capsule Take 50 tablets by mouth Daily.   Yes Dimas Lopez MD   aspirin 81 MG EC tablet Take 81 mg by mouth Daily. Patient has been taking 2 daily for the last few days due to chest pain.     Dimas Lopez MD   calcium carbonate-cholecalciferol 500-400 MG-UNIT tablet tablet Take 1 tablet by mouth Daily.    Dimas Lopez MD   Cholecalciferol (VITAMIN D) 2000 units tablet Take 2,000 Units by mouth Daily.    Dimas Lopez MD   diclofenac-misoprostol (ARTHROTEC 75) 75-0.2 MG EC tablet Take 1 tablet by mouth Daily.    Dimas Lopez MD   diclofenac-miSOPROStol (Arthrotec) 75-0.2 MG EC tablet Arthrotec 75  mg-mcg oral tablet,IR,delayed rel,biphasic take 1 tablet by oral route 1-2 times per day   Suspended    Dimas Lopez MD   Prenatal Vit-Fe Fumarate-FA (PRENATAL, CLASSIC, VITAMIN) 28-0.8 MG tablet tablet Take 1 tablet by mouth Daily.    Dimas Lopez MD   estradiol (ESTRACE) 1 MG tablet estradiol 1 mg oral tablet take 1 tablet (1 mg) by oral route once daily   Active  21  Dimas Lopez MD   rOPINIRole (REQUIP) 0.5 MG tablet Take 1 mg by mouth Every Night. Take 1 hour before bedtime.   21  Dimas Lopez MD        Patient Active Problem List   Diagnosis   • Precordial chest pain   • JAIDA on CPAP   • Jhonny-Danlos disease   • Osteoporosis   • Restless leg   • Arthritis   • Encounter for annual physical exam        Past Surgical History:   Procedure Laterality Date   • ANKLE SURGERY     • BREAST BIOPSY Bilateral 1984   • BREAST LUMPECTOMY     •  SECTION     • CHOLECYSTECTOMY     • COLONOSCOPY  2003   • GALLBLADDER SURGERY     • HYSTERECTOMY     • MYOMECTOMY     • SALPINGO OOPHORECTOMY      bso   • SHOULDER SURGERY   1989    Repair       Social History     Socioeconomic History   • Marital status:      Spouse name: Not on file   • Number of children: Not on file   • Years of education: Not on file   • Highest education level: Not on file   Tobacco Use   • Smoking status: Never Smoker   • Smokeless tobacco: Never Used   Vaping Use   • Vaping Use: Never used   Substance and Sexual  "Activity   • Alcohol use: Never   • Drug use: Never       Family History   Problem Relation Age of Onset   • Stroke Mother    • Breast cancer Mother    • Arthritis Mother    • Osteoporosis Mother    • Cancer Sister    • Arthritis Sister    • Arthritis Brother    • Heart disease Brother    • Arthritis Daughter        Family history, surgical history, past medical history, Allergies and med's reviewed with patient today and updated in [facility] EMR.     ROS:  Review of Systems   Constitutional: Negative for fatigue.   HENT: Negative for congestion, postnasal drip and rhinorrhea.    Eyes: Negative for blurred vision and visual disturbance.   Respiratory: Positive for chest tightness. Negative for cough, shortness of breath and wheezing.    Cardiovascular: Positive for chest pain. Negative for palpitations.   Gastrointestinal: Negative for abdominal pain, constipation and diarrhea.   Genitourinary: Negative for breast lump and breast pain.   Musculoskeletal: Positive for arthralgias.   Neurological: Negative for headache.   Psychiatric/Behavioral: Negative for depressed mood. The patient is not nervous/anxious.        OBJECTIVE:  Vitals:    09/23/21 1041   BP: 133/72   BP Location: Right arm   Patient Position: Sitting   Pulse: 62   Temp: 98 °F (36.7 °C)   SpO2: 97%   Weight: 63.5 kg (140 lb)   Height: 165.1 cm (65\")     No exam data present   Body mass index is 23.3 kg/m².  No LMP recorded.    Physical Exam  Vitals and nursing note reviewed.   Constitutional:       General: She is not in acute distress.     Appearance: Normal appearance. She is normal weight.   HENT:      Head: Normocephalic.      Right Ear: Tympanic membrane, ear canal and external ear normal.      Left Ear: Tympanic membrane, ear canal and external ear normal.      Nose: Nose normal.      Mouth/Throat:      Mouth: Mucous membranes are moist.      Pharynx: Oropharynx is clear.   Eyes:      General: No scleral icterus.     Conjunctiva/sclera: Conjunctivae " normal.      Pupils: Pupils are equal, round, and reactive to light.   Cardiovascular:      Rate and Rhythm: Normal rate and regular rhythm.      Pulses: Normal pulses.      Heart sounds: Normal heart sounds. No murmur heard.     Pulmonary:      Effort: Pulmonary effort is normal.      Breath sounds: Normal breath sounds. No wheezing, rhonchi or rales.   Abdominal:      General: Abdomen is flat. Bowel sounds are normal.      Palpations: Abdomen is soft. There is no mass.      Tenderness: There is no abdominal tenderness. There is no right CVA tenderness.   Musculoskeletal:      Cervical back: No rigidity or tenderness.   Lymphadenopathy:      Cervical: No cervical adenopathy.   Skin:     General: Skin is warm and dry.      Coloration: Skin is not jaundiced.      Findings: No rash.   Neurological:      General: No focal deficit present.      Mental Status: She is alert and oriented to person, place, and time.      Gait: Gait normal.   Psychiatric:         Mood and Affect: Mood normal.         Thought Content: Thought content normal.         Judgment: Judgment normal.         Procedures    No visits with results within 30 Day(s) from this visit.   Latest known visit with results is:   Hospital Outpatient Visit on 05/03/2021   Component Date Value Ref Range Status   • Coronavirus (COVID-19) 05/04/2021 NOT DETECTED  NA Final    Comment: The SARS-CoV-2 assay is a real-time, RT-PCR test intended  for the qualitative detection of nucleic acid from the  SARS-CoV-2 in respiratory specimens from individuals,  testing performed at [facility].         ASSESSMENT/ PLAN:    Diagnoses and all orders for this visit:    1. Age-related osteoporosis without current pathological fracture (Primary)  Assessment & Plan:  Her last DEXA scan was improved.  We will give her a few more years before repeating it again in the meantime we will continue her current plan of care.      2. Arthritis  Assessment & Plan:  She has lots of  osteoarthritis in her jaw neck hands and feet.  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  Currently she just uses 1 Aleve a day which allows her to remain physically active.      3. Jhonny-Danlos disease  Assessment & Plan:  Overall this causes more problems in her shoulders and hands.  She needs to avoid any activities that would cause any instability and use any bracing when need to be with other activities.      4. JAIDA on CPAP  Assessment & Plan:  She wears her CPAP on a nightly basis.      5. Restless leg  Assessment & Plan:  She does the repair and all every night.  And this helps control her restless leg syndromes.    Orders:  -     CBC & Differential    6. Encounter for annual physical exam  Assessment & Plan:  She is otherwise doing very well.  We will update her annual labs.    Orders:  -     Comprehensive Metabolic Panel  -     CBC & Differential  -     TSH  -     Lipid Panel    Other orders  -     rOPINIRole (REQUIP) 1 MG tablet; Take 1 tablet by mouth every night at bedtime for 90 days.  Dispense: 90 tablet; Refill: 3  -     atenolol (TENORMIN) 25 MG tablet; Take 1 tablet by mouth Daily for 90 days.  Dispense: 90 tablet; Refill: 3  -     estradiol (ESTRACE) 1 MG tablet; Take 1 tablet by mouth Daily for 90 days.  Dispense: 90 tablet; Refill: 3      Orders Placed Today:     New Medications Ordered This Visit   Medications   • rOPINIRole (REQUIP) 1 MG tablet     Sig: Take 1 tablet by mouth every night at bedtime for 90 days.     Dispense:  90 tablet     Refill:  3   • atenolol (TENORMIN) 25 MG tablet     Sig: Take 1 tablet by mouth Daily for 90 days.     Dispense:  90 tablet     Refill:  3   • estradiol (ESTRACE) 1 MG tablet     Sig: Take 1 tablet by mouth Daily for 90 days.     Dispense:  90 tablet     Refill:  3        Management Plan:     An After Visit Summary was printed and given to the patient at discharge.    Follow-up: Return in about 1 year (around 9/23/2022)  for Annual physical.    Mathew Mehta,  9/23/2021 11:32 EDT  This note was electronically signed.

## 2021-09-23 NOTE — ASSESSMENT & PLAN NOTE
Overall this causes more problems in her shoulders and hands.  She needs to avoid any activities that would cause any instability and use any bracing when need to be with other activities.

## 2021-12-03 ENCOUNTER — HOSPITAL ENCOUNTER (OUTPATIENT)
Dept: MAMMOGRAPHY | Facility: HOSPITAL | Age: 71
Discharge: HOME | End: 2021-12-03
Admitting: FAMILY MEDICINE

## 2021-12-03 DIAGNOSIS — Z12.31 ENCOUNTER FOR SCREENING MAMMOGRAM FOR MALIGNANT NEOPLASM OF BREAST: ICD-10-CM

## 2021-12-03 PROCEDURE — 77067 SCR MAMMO BI INCL CAD: CPT

## 2021-12-03 PROCEDURE — 77063 BREAST TOMOSYNTHESIS BI: CPT

## 2021-12-23 ENCOUNTER — OFFICE VISIT (OUTPATIENT)
Dept: FAMILY MEDICINE CLINIC | Facility: CLINIC | Age: 71
End: 2021-12-23

## 2021-12-23 VITALS
WEIGHT: 140 LBS | DIASTOLIC BLOOD PRESSURE: 72 MMHG | SYSTOLIC BLOOD PRESSURE: 129 MMHG | BODY MASS INDEX: 23.32 KG/M2 | HEART RATE: 64 BPM | HEIGHT: 65 IN | OXYGEN SATURATION: 99 % | TEMPERATURE: 98.9 F | RESPIRATION RATE: 19 BRPM

## 2021-12-23 DIAGNOSIS — J01.00 ACUTE NON-RECURRENT MAXILLARY SINUSITIS: ICD-10-CM

## 2021-12-23 DIAGNOSIS — J02.9 SORE THROAT: Primary | ICD-10-CM

## 2021-12-23 LAB
EXPIRATION DATE: NORMAL
EXPIRATION DATE: NORMAL
FLUAV AG UPPER RESP QL IA.RAPID: NOT DETECTED
FLUBV AG UPPER RESP QL IA.RAPID: NOT DETECTED
INTERNAL CONTROL: NORMAL
INTERNAL CONTROL: NORMAL
Lab: NORMAL
Lab: NORMAL
S PYO AG THROAT QL: NEGATIVE
SARS-COV-2 AG UPPER RESP QL IA.RAPID: NOT DETECTED

## 2021-12-23 PROCEDURE — 99213 OFFICE O/P EST LOW 20 MIN: CPT | Performed by: FAMILY MEDICINE

## 2021-12-23 PROCEDURE — 87428 SARSCOV & INF VIR A&B AG IA: CPT | Performed by: FAMILY MEDICINE

## 2021-12-23 PROCEDURE — 87880 STREP A ASSAY W/OPTIC: CPT | Performed by: FAMILY MEDICINE

## 2021-12-23 RX ORDER — CEFDINIR 300 MG/1
300 CAPSULE ORAL 2 TIMES DAILY
Qty: 20 CAPSULE | Refills: 0 | Status: SHIPPED | OUTPATIENT
Start: 2021-12-23 | End: 2022-01-02

## 2021-12-23 NOTE — ASSESSMENT & PLAN NOTE
We tested her for strep influenza and COVID-19.  These were all negative.  Thus she just has a significant sinusitis.  We will add a course of antibiotics.

## 2021-12-23 NOTE — PROGRESS NOTES
Chief Complaint   Patient presents with   • Sore Throat     grandson has strep         Subjective     Audrey Emerson  has a past medical history of Arthritis, Corns and callus, Jhonny-Danlos disease, Foot pain, bilateral (12/17/2019), Gastrointestinal ulcer, Mitral valve disorder, Osteoporosis, Restless leg, and Seasonal allergies.    Sore throat-she stated last evening she started with a sore throat postnasal drainage and a productive cough.  She denies any nasal congestion fever chills wheezing or shortness of breath.  She was exposed to her grandson who was diagnosed with strep.      PHQ-2 Depression Screening  Little interest or pleasure in doing things?     Feeling down, depressed, or hopeless?     PHQ-2 Total Score     PHQ-9 Depression Screening  Little interest or pleasure in doing things?     Feeling down, depressed, or hopeless?     Trouble falling or staying asleep, or sleeping too much?     Feeling tired or having little energy?     Poor appetite or overeating?     Feeling bad about yourself - or that you are a failure or have let yourself or your family down?     Trouble concentrating on things, such as reading the newspaper or watching television?     Moving or speaking so slowly that other people could have noticed? Or the opposite - being so fidgety or restless that you have been moving around a lot more than usual?     Thoughts that you would be better off dead, or of hurting yourself in some way?     PHQ-9 Total Score     If you checked off any problems, how difficult have these problems made it for you to do your work, take care of things at home, or get along with other people?       Allergies   Allergen Reactions   • Gabapentin Swelling   • Pentazocine Hallucinations       Prior to Admission medications    Medication Sig Start Date End Date Taking? Authorizing Provider   aspirin (aspirin) 81 MG EC tablet aspirin 81 mg oral tablet,delayed release (DR/EC) take 1 tablet (81 mg) by oral route once  daily   Active    ProviderDimas MD   atenolol (TENORMIN) 25 MG tablet Take 1 tablet by mouth Daily for 90 days. 21  Mathew Mehta, DO   cholecalciferol (Cholecalciferol) 25 MCG (1000 UT) tablet Vitamin D3 1,000 unit (25 mcg) oral tablet take 1 tablet by oral route daily   Active    Dimas Lopez MD   estradiol (ESTRACE) 1 MG tablet Take 1 tablet by mouth Daily for 90 days. 21  Mathew Mehta DO   folic acid (FOLVITE) 1 MG tablet folic acid oral take 1  by oral route daily   Active    ProviderDimas MD   Multiple Vitamins-Minerals (MULTIVITAMIN ADULT EXTRA C PO) multivitamin Oral tablet take 1 tablet by oral route daily   Suspended    Dimas Lopez MD   naproxen sodium (Aleve) 220 MG tablet Aleve 220 mg oral tablet take 1 tablet by oral route daily   Active    Dimas Lopez MD   Prenatal Vit-Fe Fumarate-FA (PRENATAL, CLASSIC, VITAMIN) 28-0.8 MG tablet tablet Take 1 tablet by mouth Daily.    Dimas Lopez MD   rOPINIRole (REQUIP) 1 MG tablet Take 1 tablet by mouth every night at bedtime for 90 days. 21  Mathew Mehta,    vitamin B-12 (CYANOCOBALAMIN) 1000 MCG tablet Take 1,000 mcg by mouth Daily.    ProviderDimas MD   vitamin C (ASCORBIC ACID) 250 MG tablet Take 1,000 mg by mouth Daily.    Dimas Lopez MD   Zinc 50 MG capsule Take 50 tablets by mouth Daily.    ProviderDimas MD        Patient Active Problem List   Diagnosis   • Precordial chest pain   • JAIDA on CPAP   • Jhonny-Danlos disease   • Osteoporosis   • Restless leg   • Arthritis   • Encounter for annual physical exam   • Encounter for screening mammogram for malignant neoplasm of breast   • Acute non-recurrent maxillary sinusitis        Past Surgical History:   Procedure Laterality Date   • ANKLE SURGERY     • BREAST BIOPSY Bilateral 1984   • BREAST LUMPECTOMY     •  SECTION     • CHOLECYSTECTOMY   "2007   • COLONOSCOPY  2003 2013   • GALLBLADDER SURGERY     • HYSTERECTOMY  1999   • MYOMECTOMY  1983   • SALPINGO OOPHORECTOMY  1999    bso   • SHOULDER SURGERY  1996 1989    Repair       Social History     Socioeconomic History   • Marital status:    Tobacco Use   • Smoking status: Never Smoker   • Smokeless tobacco: Never Used   Vaping Use   • Vaping Use: Never used   Substance and Sexual Activity   • Alcohol use: Never   • Drug use: Never       Family History   Problem Relation Age of Onset   • Stroke Mother    • Breast cancer Mother    • Arthritis Mother    • Osteoporosis Mother    • Cancer Sister    • Arthritis Sister    • Arthritis Brother    • Heart disease Brother    • Arthritis Daughter        Family history, surgical history, past medical history, Allergies and med's reviewed with patient today and updated in [facility] EMR.     ROS:  Review of Systems   Constitutional: Negative for chills, fatigue and fever.   HENT: Positive for postnasal drip and sore throat. Negative for congestion.    Respiratory: Positive for cough. Negative for chest tightness, shortness of breath and wheezing.    Cardiovascular: Negative for chest pain and palpitations.   Neurological: Positive for headache.   Hematological: Negative for adenopathy.       OBJECTIVE:  Vitals:    12/23/21 1103   BP: 129/72   Pulse: 64   Resp: 19   Temp: 98.9 °F (37.2 °C)   TempSrc: Temporal   SpO2: 99%   Weight: 63.5 kg (140 lb)   Height: 165.1 cm (65\")     No exam data present   Body mass index is 23.3 kg/m².  No LMP recorded.    Physical Exam  Vitals and nursing note reviewed.   Constitutional:       General: She is not in acute distress.     Appearance: Normal appearance. She is normal weight.   HENT:      Head: Normocephalic.      Right Ear: Tympanic membrane, ear canal and external ear normal.      Left Ear: Tympanic membrane, ear canal and external ear normal.      Nose: Nose normal.      Mouth/Throat:      Mouth: Mucous membranes are moist. "      Pharynx: Oropharynx is clear.   Eyes:      General: No scleral icterus.     Conjunctiva/sclera: Conjunctivae normal.      Pupils: Pupils are equal, round, and reactive to light.   Cardiovascular:      Rate and Rhythm: Normal rate and regular rhythm.      Pulses: Normal pulses.      Heart sounds: Normal heart sounds. No murmur heard.      Pulmonary:      Effort: Pulmonary effort is normal.      Breath sounds: Normal breath sounds. No wheezing, rhonchi or rales.   Musculoskeletal:      Cervical back: No rigidity or tenderness.   Lymphadenopathy:      Cervical: No cervical adenopathy.   Skin:     General: Skin is warm and dry.      Coloration: Skin is not jaundiced.      Findings: No rash.   Neurological:      General: No focal deficit present.      Mental Status: She is alert and oriented to person, place, and time.   Psychiatric:         Mood and Affect: Mood normal.         Thought Content: Thought content normal.         Judgment: Judgment normal.         Procedures    Office Visit on 12/23/2021   Component Date Value Ref Range Status   • SARS Antigen 12/23/2021 Not Detected  Not Detected Final   • Influenza A Antigen CAPRI 12/23/2021 Not Detected   Final   • Influenza B Antigen CAPRI 12/23/2021 Not Detected   Final   • Internal Control 12/23/2021 Passed  Passed Final   • Lot Number 12/23/2021 [id number]   Final   • Expiration Date 12/23/2021 12/07/2022   Final   • Rapid Strep A Screen 12/23/2021 Negative  Negative, VALID, INVALID, Not Performed Final   • Internal Control 12/23/2021 Passed  Passed Final   • Lot Number 12/23/2021 [id number]   Final   • Expiration Date 12/23/2021 06/30/2022   Final       ASSESSMENT/ PLAN:    Diagnoses and all orders for this visit:    1. Sore throat (Primary)  -     POCT SARS-CoV-2 Antigen CAPRI + Flu  -     POCT rapid strep A    2. Acute non-recurrent maxillary sinusitis  Assessment & Plan:  We tested her for strep influenza and COVID-19.  These were all negative.  Thus she just has a  significant sinusitis.  We will add a course of antibiotics.      Other orders  -     cefdinir (OMNICEF) 300 MG capsule; Take 1 capsule by mouth 2 (Two) Times a Day for 10 days.  Dispense: 20 capsule; Refill: 0      Orders Placed Today:     New Medications Ordered This Visit   Medications   • cefdinir (OMNICEF) 300 MG capsule     Sig: Take 1 capsule by mouth 2 (Two) Times a Day for 10 days.     Dispense:  20 capsule     Refill:  0        Management Plan:     An After Visit Summary was printed and given to the patient at discharge.    Follow-up: Return if symptoms worsen or fail to improve.    Mathew Mehta,  12/23/2021 11:59 EST  This note was electronically signed.

## 2022-07-13 ENCOUNTER — TELEPHONE (OUTPATIENT)
Dept: OBSTETRICS AND GYNECOLOGY | Facility: CLINIC | Age: 72
End: 2022-07-13

## 2022-07-13 NOTE — TELEPHONE ENCOUNTER
Malka Guillaume denied refill on Estradiol.  Per [facility] patient has Rx for Estradiol 1mg given on 9-23-21 # 90 with 3 refills.   mother

## 2022-09-27 ENCOUNTER — OFFICE VISIT (OUTPATIENT)
Dept: FAMILY MEDICINE CLINIC | Facility: CLINIC | Age: 72
End: 2022-09-27

## 2022-09-27 ENCOUNTER — TELEPHONE (OUTPATIENT)
Dept: SLEEP MEDICINE | Facility: HOSPITAL | Age: 72
End: 2022-09-27

## 2022-09-27 VITALS
DIASTOLIC BLOOD PRESSURE: 71 MMHG | SYSTOLIC BLOOD PRESSURE: 135 MMHG | HEIGHT: 65 IN | HEART RATE: 57 BPM | BODY MASS INDEX: 22.49 KG/M2 | TEMPERATURE: 98.5 F | OXYGEN SATURATION: 99 % | WEIGHT: 135 LBS

## 2022-09-27 DIAGNOSIS — G47.33 OSA ON CPAP: ICD-10-CM

## 2022-09-27 DIAGNOSIS — G25.81 RESTLESS LEG: ICD-10-CM

## 2022-09-27 DIAGNOSIS — M89.9 DISORDER OF BONE, UNSPECIFIED: ICD-10-CM

## 2022-09-27 DIAGNOSIS — Z99.89 OSA ON CPAP: ICD-10-CM

## 2022-09-27 DIAGNOSIS — Z23 NEED FOR INFLUENZA VACCINATION: Primary | ICD-10-CM

## 2022-09-27 DIAGNOSIS — Z12.31 ENCOUNTER FOR SCREENING MAMMOGRAM FOR MALIGNANT NEOPLASM OF BREAST: ICD-10-CM

## 2022-09-27 DIAGNOSIS — Z00.00 MEDICARE ANNUAL WELLNESS VISIT, SUBSEQUENT: ICD-10-CM

## 2022-09-27 DIAGNOSIS — M81.0 AGE-RELATED OSTEOPOROSIS WITHOUT CURRENT PATHOLOGICAL FRACTURE: ICD-10-CM

## 2022-09-27 LAB
25(OH)D3 SERPL-MCNC: 68.6 NG/ML (ref 30–100)
ALBUMIN SERPL-MCNC: 4.3 G/DL (ref 3.5–5.2)
ALBUMIN/GLOB SERPL: 1.7 G/DL
ALP SERPL-CCNC: 89 U/L (ref 39–117)
ALT SERPL W P-5'-P-CCNC: 22 U/L (ref 1–33)
ANION GAP SERPL CALCULATED.3IONS-SCNC: 8.2 MMOL/L (ref 5–15)
AST SERPL-CCNC: 27 U/L (ref 1–32)
BASOPHILS # BLD AUTO: 0.02 10*3/MM3 (ref 0–0.2)
BASOPHILS NFR BLD AUTO: 0.5 % (ref 0–1.5)
BILIRUB SERPL-MCNC: 0.5 MG/DL (ref 0–1.2)
BUN SERPL-MCNC: 16 MG/DL (ref 8–23)
BUN/CREAT SERPL: 17.6 (ref 7–25)
CALCIUM SPEC-SCNC: 9.7 MG/DL (ref 8.6–10.5)
CHLORIDE SERPL-SCNC: 104 MMOL/L (ref 98–107)
CHOLEST SERPL-MCNC: 148 MG/DL (ref 0–200)
CO2 SERPL-SCNC: 25.8 MMOL/L (ref 22–29)
CREAT SERPL-MCNC: 0.91 MG/DL (ref 0.57–1)
DEPRECATED RDW RBC AUTO: 40.7 FL (ref 37–54)
EGFRCR SERPLBLD CKD-EPI 2021: 67.6 ML/MIN/1.73
EOSINOPHIL # BLD AUTO: 0.1 10*3/MM3 (ref 0–0.4)
EOSINOPHIL NFR BLD AUTO: 2.3 % (ref 0.3–6.2)
ERYTHROCYTE [DISTWIDTH] IN BLOOD BY AUTOMATED COUNT: 12 % (ref 12.3–15.4)
GLOBULIN UR ELPH-MCNC: 2.6 GM/DL
GLUCOSE SERPL-MCNC: 89 MG/DL (ref 65–99)
HCT VFR BLD AUTO: 41.7 % (ref 34–46.6)
HDLC SERPL-MCNC: 57 MG/DL (ref 40–60)
HGB BLD-MCNC: 14.5 G/DL (ref 12–15.9)
IMM GRANULOCYTES # BLD AUTO: 0.01 10*3/MM3 (ref 0–0.05)
IMM GRANULOCYTES NFR BLD AUTO: 0.2 % (ref 0–0.5)
LDLC SERPL CALC-MCNC: 75 MG/DL (ref 0–100)
LDLC/HDLC SERPL: 1.31 {RATIO}
LYMPHOCYTES # BLD AUTO: 1.34 10*3/MM3 (ref 0.7–3.1)
LYMPHOCYTES NFR BLD AUTO: 31.1 % (ref 19.6–45.3)
MCH RBC QN AUTO: 32.2 PG (ref 26.6–33)
MCHC RBC AUTO-ENTMCNC: 34.8 G/DL (ref 31.5–35.7)
MCV RBC AUTO: 92.7 FL (ref 79–97)
MONOCYTES # BLD AUTO: 0.44 10*3/MM3 (ref 0.1–0.9)
MONOCYTES NFR BLD AUTO: 10.2 % (ref 5–12)
NEUTROPHILS NFR BLD AUTO: 2.4 10*3/MM3 (ref 1.7–7)
NEUTROPHILS NFR BLD AUTO: 55.7 % (ref 42.7–76)
NRBC BLD AUTO-RTO: 0 /100 WBC (ref 0–0.2)
PLATELET # BLD AUTO: 171 10*3/MM3 (ref 140–450)
PMV BLD AUTO: 12.4 FL (ref 6–12)
POTASSIUM SERPL-SCNC: 5 MMOL/L (ref 3.5–5.2)
PROT SERPL-MCNC: 6.9 G/DL (ref 6–8.5)
RBC # BLD AUTO: 4.5 10*6/MM3 (ref 3.77–5.28)
SODIUM SERPL-SCNC: 138 MMOL/L (ref 136–145)
TRIGL SERPL-MCNC: 81 MG/DL (ref 0–150)
TSH SERPL DL<=0.05 MIU/L-ACNC: 1.85 UIU/ML (ref 0.27–4.2)
VLDLC SERPL-MCNC: 16 MG/DL (ref 5–40)
WBC NRBC COR # BLD: 4.31 10*3/MM3 (ref 3.4–10.8)

## 2022-09-27 PROCEDURE — 84443 ASSAY THYROID STIM HORMONE: CPT | Performed by: FAMILY MEDICINE

## 2022-09-27 PROCEDURE — 85025 COMPLETE CBC W/AUTO DIFF WBC: CPT | Performed by: FAMILY MEDICINE

## 2022-09-27 PROCEDURE — G0008 ADMIN INFLUENZA VIRUS VAC: HCPCS | Performed by: FAMILY MEDICINE

## 2022-09-27 PROCEDURE — 80061 LIPID PANEL: CPT | Performed by: FAMILY MEDICINE

## 2022-09-27 PROCEDURE — 82306 VITAMIN D 25 HYDROXY: CPT | Performed by: FAMILY MEDICINE

## 2022-09-27 PROCEDURE — 96160 PT-FOCUSED HLTH RISK ASSMT: CPT | Performed by: FAMILY MEDICINE

## 2022-09-27 PROCEDURE — 80053 COMPREHEN METABOLIC PANEL: CPT | Performed by: FAMILY MEDICINE

## 2022-09-27 PROCEDURE — G0439 PPPS, SUBSEQ VISIT: HCPCS | Performed by: FAMILY MEDICINE

## 2022-09-27 PROCEDURE — 1159F MED LIST DOCD IN RCRD: CPT | Performed by: FAMILY MEDICINE

## 2022-09-27 PROCEDURE — 36415 COLL VENOUS BLD VENIPUNCTURE: CPT | Performed by: FAMILY MEDICINE

## 2022-09-27 PROCEDURE — 1170F FXNL STATUS ASSESSED: CPT | Performed by: FAMILY MEDICINE

## 2022-09-27 PROCEDURE — 90662 IIV NO PRSV INCREASED AG IM: CPT | Performed by: FAMILY MEDICINE

## 2022-09-27 RX ORDER — ROPINIROLE 1 MG/1
1 TABLET, FILM COATED ORAL
Qty: 90 TABLET | Refills: 3 | Status: SHIPPED | OUTPATIENT
Start: 2022-09-27 | End: 2023-01-09 | Stop reason: SDUPTHER

## 2022-09-27 RX ORDER — PROMETHAZINE HYDROCHLORIDE 25 MG/1
25 TABLET ORAL EVERY 6 HOURS PRN
Qty: 15 TABLET | Refills: 1 | Status: SHIPPED | OUTPATIENT
Start: 2022-09-27

## 2022-09-27 RX ORDER — ATENOLOL 25 MG/1
25 TABLET ORAL DAILY
Qty: 90 TABLET | Refills: 3 | Status: SHIPPED | OUTPATIENT
Start: 2022-09-27

## 2022-09-27 RX ORDER — ATENOLOL 25 MG/1
25 TABLET ORAL DAILY
COMMUNITY
Start: 2022-06-29 | End: 2022-09-27 | Stop reason: SDUPTHER

## 2022-09-27 RX ORDER — ESTRADIOL 1 MG/1
1 TABLET ORAL DAILY
Qty: 90 TABLET | Refills: 3 | Status: SHIPPED | OUTPATIENT
Start: 2022-09-27 | End: 2023-01-09

## 2022-09-27 NOTE — ASSESSMENT & PLAN NOTE
Overall her restless leg syndrome is controlled with her current medication and timing of her medication.

## 2022-09-27 NOTE — TELEPHONE ENCOUNTER
Attempted to call patient in regard to My Chart message stating she needs an appointment. Mailbox was not set up and was unable to leave vicki

## 2022-09-27 NOTE — PROGRESS NOTES
AWV Documentation:   Patient Info:     I reviewed all aspects of the patient's history      Compared to 1 year ago, patient's physical health feels:  Better    Compared to 1 year ago, patient's mental health feels:  The same  Advanced Care Planning:     Was a discussion had with the patient regarding their ACP?: Yes      Details included:  Has an advance directive in EMR which is still valid  Vital Signs:     Blood Pressure Evaluation:  In the acceptable range  Health Risk Assessment:     Does the patient have evidence of cognitive impairment: No      Is aspirin on the med list: Yes      Aspirin use:  Aspirin use is indicated based on review of current medical condition(s). Pros and cons of this therapy have been discussed today. Benefits of this medication outweigh potential harm. Patient has been encouraged to continue taking this medication.      Subsequent Medicare Wellness Visit    Chief Complaint   Patient presents with   • Annual Exam     MAW   requesting refills on all meds       Subjective    History of Present Illness:  Audrey Emerson is a 72 y.o. female who presents for a Subsequent Medicare Wellness Visit.    The following portions of the patient's history were reviewed and   updated as appropriate: allergies, current medications, past family history, past medical history, past social history, past surgical history and problem list.    Compared to one year ago, the patient feels her physical   health is better.    Compared to one year ago, the patient feels her mental   health is the same.    Recent Hospitalizations:  She was not admitted to the hospital during the last year.     JAIDA-she does wear her CPAP mask on a nightly basis.  She states oftentimes in the past year she has had more restless sleep than previously.    RLS-she takes her ropinirole on a nightly basis.  She states she takes it about 3 or 4 hours before going to bed and it seems to work better or earlier if she takes it.  She states it  does not work as well as it did historically though.    Current Medical Providers:  Patient Care Team:  Mathew Mehta DO as PCP - General (Family Medicine)    Outpatient Medications Prior to Visit   Medication Sig Dispense Refill   • aspirin 81 MG EC tablet aspirin 81 mg oral tablet,delayed release (DR/EC) take 1 tablet (81 mg) by oral route once daily   Active     • cholecalciferol (VITAMIN D3) 25 MCG (1000 UT) tablet Vitamin D3 1,000 unit (25 mcg) oral tablet take 1 tablet by oral route daily   Active     • folic acid (FOLVITE) 1 MG tablet folic acid oral take 1  by oral route daily   Active     • Multiple Vitamins-Minerals (MULTIVITAMIN ADULT EXTRA C PO) multivitamin Oral tablet take 1 tablet by oral route daily   Suspended     • naproxen sodium (ALEVE) 220 MG tablet Aleve 220 mg oral tablet take 1 tablet by oral route daily   Active     • Prenatal Vit-Fe Fumarate-FA (PRENATAL, CLASSIC, VITAMIN) 28-0.8 MG tablet tablet Take 1 tablet by mouth Daily.     • vitamin B-12 (CYANOCOBALAMIN) 1000 MCG tablet Take 1,000 mcg by mouth Daily.     • vitamin C (ASCORBIC ACID) 250 MG tablet Take 1,000 mg by mouth Daily.     • Zinc 50 MG capsule Take 50 tablets by mouth Daily.     • atenolol (TENORMIN) 25 MG tablet Take 25 mg by mouth Daily.     • estradiol (ESTRACE) 1 MG tablet Take 1 tablet by mouth Daily for 90 days. 90 tablet 3   • rOPINIRole (REQUIP) 1 MG tablet Take 1 tablet by mouth every night at bedtime for 90 days. 90 tablet 3     No facility-administered medications prior to visit.       No opioid medication identified on active medication list. I have reviewed chart for other potential  high risk medication/s and harmful drug interactions in the elderly.          Aspirin is on active medication list. Aspirin use is indicated based on review of current medical condition/s. Pros and cons of this therapy have been discussed today. Benefits of this medication outweigh potential harm.  Patient has been  "encouraged to continue taking this medication.  .      Patient Active Problem List   Diagnosis   • Precordial chest pain   • JAIDA on CPAP   • Jhonny-Danlos disease   • Osteoporosis   • Restless leg   • Arthritis   • Encounter for annual physical exam   • Encounter for screening mammogram for malignant neoplasm of breast   • Acute non-recurrent maxillary sinusitis   • Medicare annual wellness visit, subsequent     Advance Care Planning  Advance Directive is on file.  ACP discussion was held with the patient during this visit. Patient has an advance directive in EMR which is still valid.     Review of Systems   Constitutional: Negative for fatigue.   HENT: Negative for congestion, postnasal drip and rhinorrhea.    Eyes: Negative for visual disturbance.   Respiratory: Negative for cough, chest tightness, shortness of breath and wheezing.    Cardiovascular: Positive for palpitations. Negative for chest pain.   Gastrointestinal: Negative for constipation and diarrhea.   Psychiatric/Behavioral: The patient is not nervous/anxious.         Objective    Vitals:    09/27/22 0930   BP: 135/71   BP Location: Left arm   Patient Position: Sitting   Pulse: 57   Temp: 98.5 °F (36.9 °C)   SpO2: 99%   Weight: 61.2 kg (135 lb)   Height: 165.1 cm (65\")     Estimated body mass index is 22.47 kg/m² as calculated from the following:    Height as of this encounter: 165.1 cm (65\").    Weight as of this encounter: 61.2 kg (135 lb).    BMI is within normal parameters. No other follow-up for BMI required.      Does the patient have evidence of cognitive impairment? No    Physical Exam  Vitals and nursing note reviewed.   Constitutional:       General: She is not in acute distress.     Appearance: Normal appearance. She is normal weight.   HENT:      Head: Normocephalic.      Right Ear: Tympanic membrane, ear canal and external ear normal.      Left Ear: Tympanic membrane, ear canal and external ear normal.      Nose: Nose normal.      " Mouth/Throat:      Mouth: Mucous membranes are moist.      Pharynx: Oropharynx is clear.   Eyes:      General: No scleral icterus.     Conjunctiva/sclera: Conjunctivae normal.      Pupils: Pupils are equal, round, and reactive to light.   Cardiovascular:      Rate and Rhythm: Normal rate and regular rhythm.      Pulses: Normal pulses.      Heart sounds: Normal heart sounds. No murmur heard.  Pulmonary:      Effort: Pulmonary effort is normal.      Breath sounds: Normal breath sounds. No wheezing, rhonchi or rales.   Musculoskeletal:      Cervical back: Neck supple. No rigidity or tenderness.   Lymphadenopathy:      Cervical: No cervical adenopathy.   Skin:     General: Skin is warm and dry.      Coloration: Skin is not jaundiced.      Findings: No rash.   Neurological:      General: No focal deficit present.      Mental Status: She is alert and oriented to person, place, and time.   Psychiatric:         Mood and Affect: Mood normal.         Thought Content: Thought content normal.         Judgment: Judgment normal.                 HEALTH RISK ASSESSMENT    Smoking Status:  Social History     Tobacco Use   Smoking Status Never Smoker   Smokeless Tobacco Never Used     Alcohol Consumption:  Social History     Substance and Sexual Activity   Alcohol Use Never     Fall Risk Screen:    STEADI Fall Risk Assessment was completed, and patient is at LOW risk for falls.Assessment completed on:9/27/2022    Depression Screening:  PHQ-2/PHQ-9 Depression Screening 9/27/2022   Retired PHQ-9 Total Score -   Retired Total Score -   Little Interest or Pleasure in Doing Things 0-->not at all   Feeling Down, Depressed or Hopeless 0-->not at all   Trouble Falling or Staying Asleep, or Sleeping Too Much 0-->not at all   Feeling Tired or Having Little Energy 0-->not at all   Poor Appetite or Overeating 0-->not at all   Feeling Bad about Yourself - or that You are a Failure or Have Let Yourself or Your Family Down 0-->not at all   Trouble  Concentrating on Things, Such as Reading the Newspaper or Watching Television 0-->not at all   Moving or Speaking So Slowly that Other People Could Have Noticed? Or the Opposite - Being So Fidgety 0-->not at all   Thoughts that You Would be Better Off Dead or of Hurting Yourself in Some Way 0-->not at all   PHQ-9: Brief Depression Severity Measure Score 0   If You Checked Off Any Problems, How Difficult Have These Problems Made It For You to Do Your Work, Take Care of Things at Home, or Get Along with Other People? not difficult at all       Health Habits and Functional and Cognitive Screening:  Functional & Cognitive Status 9/27/2022   Do you have difficulty preparing food and eating? No   Do you have difficulty bathing yourself, getting dressed or grooming yourself? No   Do you have difficulty using the toilet? No   Do you have difficulty moving around from place to place? No   Do you have trouble with steps or getting out of a bed or a chair? No   Current Diet Well Balanced Diet   Dental Exam Up to date   Eye Exam Up to date   Exercise (times per week) 4 times per week   Current Exercises Include Walking   Do you need help using the phone?  No   Are you deaf or do you have serious difficulty hearing?  No   Do you need help with transportation? No   Do you need help shopping? No   Do you need help preparing meals?  No   Do you need help with housework?  No   Do you need help with laundry? No   Do you need help taking your medications? No   Do you need help managing money? No   Do you ever drive or ride in a car without wearing a seat belt? No   Have you felt unusual stress, anger or loneliness in the last month? No   Who do you live with? Spouse   If you need help, do you have trouble finding someone available to you? No   Have you been bothered in the last four weeks by sexual problems? No   Do you have difficulty concentrating, remembering or making decisions? No       Age-appropriate Screening Schedule:  Refer  to the list below for future screening recommendations based on patient's age, sex and/or medical conditions. Orders for these recommended tests are listed in the plan section. The patient has been provided with a written plan.    Health Maintenance   Topic Date Due   • TDAP/TD VACCINES (1 - Tdap) Never done   • ZOSTER VACCINE (1 of 2) Never done   • DXA SCAN  05/07/2021   • INFLUENZA VACCINE  10/01/2022   • MAMMOGRAM  12/03/2023              Assessment & Plan   CMS Preventative Services Quick Reference  Risk Factors Identified During Encounter  Immunizations Discussed/Encouraged (specific Immunizations; Influenza and COVID19  The above risks/problems have been discussed with the patient.  Follow up actions/plans if indicated are seen below in the Assessment/Plan Section.  Pertinent information has been shared with the patient in the After Visit Summary.    Diagnoses and all orders for this visit:    1. Need for influenza vaccination (Primary)  -     Fluzone High-Dose 65+yrs (8045-7858)    2. Restless leg  Assessment & Plan:  Overall her restless leg syndrome is controlled with her current medication and timing of her medication.    Orders:  -     CBC & Differential    3. JAIDA on CPAP    4. Encounter for screening mammogram for malignant neoplasm of breast  -     Mammo Screening Digital Tomosynthesis Bilateral With CAD; Future    5. Medicare annual wellness visit, subsequent  Assessment & Plan:  Overall she is doing great.  I will not make any changes at this time.    Orders:  -     Comprehensive Metabolic Panel  -     CBC & Differential  -     TSH  -     Lipid Panel  -     Vitamin D 25 Hydroxy  -     Mammo Screening Digital Tomosynthesis Bilateral With CAD; Future  -     DEXA Bone Density Axial; Future    6. Age-related osteoporosis without current pathological fracture  Assessment & Plan:  Is been about 3 years since she has had a DEXA scan.  We will update that and check her calcium and vitamin D levels as  well.    Orders:  -     DEXA Bone Density Axial; Future    7. Disorder of bone, unspecified   -     Vitamin D 25 Hydroxy    Other orders  -     rOPINIRole (REQUIP) 1 MG tablet; Take 1 tablet by mouth every night at bedtime for 90 days.  Dispense: 90 tablet; Refill: 3  -     atenolol (TENORMIN) 25 MG tablet; Take 1 tablet by mouth Daily.  Dispense: 90 tablet; Refill: 3  -     estradiol (ESTRACE) 1 MG tablet; Take 1 tablet by mouth Daily for 90 days.  Dispense: 90 tablet; Refill: 3  -     promethazine (PHENERGAN) 25 MG tablet; Take 1 tablet by mouth Every 6 (Six) Hours As Needed for Nausea or Vomiting.  Dispense: 15 tablet; Refill: 1      Follow Up:   Return in about 1 year (around 9/27/2023).     An After Visit Summary and PPPS were made available to the patient.

## 2022-12-20 ENCOUNTER — HOSPITAL ENCOUNTER (OUTPATIENT)
Dept: MAMMOGRAPHY | Facility: HOSPITAL | Age: 72
Discharge: HOME | End: 2022-12-20

## 2022-12-20 ENCOUNTER — HOSPITAL ENCOUNTER (OUTPATIENT)
Dept: BONE DENSITY | Facility: HOSPITAL | Age: 72
Discharge: HOME | End: 2022-12-20

## 2022-12-20 DIAGNOSIS — Z00.00 MEDICARE ANNUAL WELLNESS VISIT, SUBSEQUENT: ICD-10-CM

## 2022-12-20 DIAGNOSIS — Z12.31 ENCOUNTER FOR SCREENING MAMMOGRAM FOR MALIGNANT NEOPLASM OF BREAST: ICD-10-CM

## 2022-12-20 DIAGNOSIS — M81.0 AGE-RELATED OSTEOPOROSIS WITHOUT CURRENT PATHOLOGICAL FRACTURE: ICD-10-CM

## 2022-12-20 DIAGNOSIS — Z12.39 SCREENING FOR BREAST CANCER USING NON-MAMMOGRAM MODALITY: Primary | ICD-10-CM

## 2022-12-20 PROCEDURE — 77080 DXA BONE DENSITY AXIAL: CPT

## 2022-12-20 PROCEDURE — 77067 SCR MAMMO BI INCL CAD: CPT

## 2022-12-20 PROCEDURE — 77063 BREAST TOMOSYNTHESIS BI: CPT

## 2022-12-29 DIAGNOSIS — Z12.31 ENCOUNTER FOR SCREENING MAMMOGRAM FOR MALIGNANT NEOPLASM OF BREAST: Primary | ICD-10-CM

## 2023-01-09 ENCOUNTER — OFFICE VISIT (OUTPATIENT)
Dept: SLEEP MEDICINE | Facility: HOSPITAL | Age: 73
End: 2023-01-09
Payer: MEDICARE

## 2023-01-09 VITALS
DIASTOLIC BLOOD PRESSURE: 70 MMHG | SYSTOLIC BLOOD PRESSURE: 124 MMHG | HEART RATE: 58 BPM | BODY MASS INDEX: 23.13 KG/M2 | WEIGHT: 138.8 LBS | HEIGHT: 65 IN | OXYGEN SATURATION: 97 %

## 2023-01-09 DIAGNOSIS — G25.81 RESTLESS LEG: ICD-10-CM

## 2023-01-09 DIAGNOSIS — Z99.89 OSA ON CPAP: Primary | ICD-10-CM

## 2023-01-09 DIAGNOSIS — G47.33 OSA ON CPAP: Primary | ICD-10-CM

## 2023-01-09 PROCEDURE — 99214 OFFICE O/P EST MOD 30 MIN: CPT | Performed by: INTERNAL MEDICINE

## 2023-01-09 PROCEDURE — G0463 HOSPITAL OUTPT CLINIC VISIT: HCPCS

## 2023-01-09 RX ORDER — ROPINIROLE 1 MG/1
2 TABLET, FILM COATED ORAL
Qty: 90 TABLET | Refills: 3 | Status: SHIPPED | OUTPATIENT
Start: 2023-01-09 | End: 2023-07-08

## 2023-01-09 NOTE — PROGRESS NOTES
[address] [address]  [place]   [place] [address]  Phone: [phone]  Fax: [phone]      SLEEP CLINIC FOLLOW UP PROGRESS NOTE.    Audrey Emerson  [id number]   1950  72 y.o.  female      PCP: Mathew Mehta DO      Date of visit: 1/9/2023    Chief Complaint   Patient presents with   • Sleep Apnea   • Restless Legs Syndrome       HPI:  This is a 72 y.o. years old patient is here for the management of obstructive sleep apnea.  Sleep apnea is moderate in severity with a AHI of 15/hr.Patient is using positive airway pressure therapy and the symptoms of sleep apnea have improved significantly on auto CPAP. Normally patient goes to bed at 10 PM PM and wakes up at 5 AM AM .  The patient wakes up 1-2 time(s) during the night and has no problem going back to sleep.  Feels refreshed after waking up.      Patient also has restless leg syndrome she is on Requip 1 mg for the past 4 years but now she says that her symptoms are not well controlled which is interfering with her quality of sleep..     Medications and allergies are reviewed by me and documented in the encounter.     SOCIAL ( habits pertaining to sleep medicine)  • History of tobacco use:No   • History of alcohol use: 0 per week  • Caffeine use: 2    REVIEW OF SYSTEMS:   Pertaining positive symptoms:  • Babb Sleepiness Scale :Total score: 0   • Crawly feeling in the legs      PHYSICAL EXAMINATION:  CONSTITUTIONAL:  Vitals:    01/09/23 1100   BP: 124/70   Pulse: 58   SpO2: 97%   Weight: 63 kg (138 lb 12.8 oz)   Height: 165.1 cm (65\")    Body mass index is 23.1 kg/m².   NOSE: nasal passages are clear, No deformities noted   RESP SYSTEM: Not in any respiratory distress, no chest deformities noted,   CARDIOVASULAR: No edema noted  NEURO: Oriented x 3, gait normal,  Mood and affect appeared appropriate      Data reviewed:  The Smart card downloaded on 1/9/2023 has been reviewed independently by me for compliance and  discussed the data with the patient.   Compliance; 99%  More than 4 hr use, 92 %  Average use of the device 6 hours and 38 minutes per night  Residual AHI: Three-point /hr (goal < 5.0 /hr)  Mask type: Nasal cradle  Device: DreamStation  DME: [employer]      ASSESSMENT AND PLAN:  · Obstructive sleep apnea ( G 47.33).  The symptoms of sleep apnea have improved with the device and the treatment.  Patient's compliance with the device is excellent for treatment of sleep apnea.  I have independently reviewed the smart card down load and discussed with the patient the download data and encouarged the patient to continue to use the device.The residual AHI is acceptable. The device is benefiting the patient and the device is medically necessary.  Without proper control of sleep apnea and good compliance there is a increased risk for hypertension, diabetes mellitus and nonrestorative sleep with hypersomnia which can increase risk for motor vehicle accidents.  Untreated sleep apnea is also a risk factor for development of atrial fibrillation, pulmonary hypertension, insulin resistance and stroke. The patient is also instructed to get the supplies from the [employer] company and and change them on a regular basis.  A prescription for supplies has been sent to the [employer] company.  I have also discussed the good sleep hygiene habits and adequate amount of sleep needed for good health.  · Restless leg syndrome, her symptoms or not well controlled I feel that she is developing augmentation I am going to increase the dose to 2 mg at night.  I have sent a prescription for 180 tablets with 3 refills.  Also talked to the patient about the side effects especially compulsive behavior.  If she develops any of these behaviors including compulsive shopping, eating she needs to call the sleep center and reduce the dose  · Return in about 1 year (around 1/9/2024). . Patient's questions were answered.      Kenzie Alvarez MD  Sleep Medicine  Medical  Director, [facility], [facility] [facility]  1/9/2023

## 2023-01-19 ENCOUNTER — TELEPHONE (OUTPATIENT)
Dept: ORTHOPEDIC SURGERY | Facility: CLINIC | Age: 73
End: 2023-01-19

## 2023-01-19 NOTE — TELEPHONE ENCOUNTER
Caller: SHAKIRA     Relationship to patient: SELF     Best call back number: [phone]     Type of visit: BILATERAL THUMB INJECTIONS

## 2023-01-26 ENCOUNTER — OFFICE VISIT (OUTPATIENT)
Dept: ORTHOPEDIC SURGERY | Facility: CLINIC | Age: 73
End: 2023-01-26
Payer: MEDICARE

## 2023-01-26 VITALS — HEART RATE: 65 BPM | WEIGHT: 141 LBS | HEIGHT: 65 IN | OXYGEN SATURATION: 100 % | BODY MASS INDEX: 23.49 KG/M2

## 2023-01-26 DIAGNOSIS — M79.642 LEFT HAND PAIN: Primary | ICD-10-CM

## 2023-01-26 DIAGNOSIS — M79.641 RIGHT HAND PAIN: ICD-10-CM

## 2023-01-26 PROCEDURE — 99203 OFFICE O/P NEW LOW 30 MIN: CPT | Performed by: ORTHOPAEDIC SURGERY

## 2023-01-26 PROCEDURE — 20600 DRAIN/INJ JOINT/BURSA W/O US: CPT | Performed by: ORTHOPAEDIC SURGERY

## 2023-01-26 RX ADMIN — TRIAMCINOLONE ACETONIDE 40 MG: 40 INJECTION, SUSPENSION INTRA-ARTICULAR; INTRAMUSCULAR at 14:36

## 2023-01-26 RX ADMIN — LIDOCAINE HYDROCHLORIDE 1 ML: 10 INJECTION, SOLUTION INFILTRATION; PERINEURAL at 14:36

## 2023-01-26 NOTE — PROGRESS NOTES
"Chief Complaint  Pain and Initial Evaluation of the Right Hand and Pain and Initial Evaluation of the Left Hand     Subjective      Audrey Emerson presents to [facility] for evaluation of the bilateral hands. The patient reports bilateral thumb pain. The patient has previously had bilateral thumb CMC injections by me in 2018. She reports she has been doing well until recently. She denies any recent trauma.     Allergies   Allergen Reactions   • Gabapentin Swelling   • Pentazocine Hallucinations        Social History     Socioeconomic History   • Marital status:    Tobacco Use   • Smoking status: Never   • Smokeless tobacco: Never   Vaping Use   • Vaping Use: Never used   Substance and Sexual Activity   • Alcohol use: Never   • Drug use: Never        Review of Systems     Objective   Vital Signs:   Pulse 65   Ht 165.1 cm (65\")   Wt 64 kg (141 lb)   SpO2 100%   BMI 23.46 kg/m²       Physical Exam  Constitutional:       Appearance: Normal appearance. Patient is well-developed and normal weight.   HENT:      Head: Normocephalic.      Right Ear: Hearing and external ear normal.      Left Ear: Hearing and external ear normal.      Nose: Nose normal.   Eyes:      Conjunctiva/sclera: Conjunctivae normal.   Cardiovascular:      Rate and Rhythm: Normal rate.   Pulmonary:      Effort: Pulmonary effort is normal.      Breath sounds: No wheezing or rales.   Abdominal:      Palpations: Abdomen is soft.      Tenderness: There is no abdominal tenderness.   Musculoskeletal:      Cervical back: Normal range of motion.   Skin:     Findings: No rash.   Neurological:      Mental Status: Patient is alert and oriented to person, place, and time.   Psychiatric:         Mood and Affect: Mood and affect normal.         Judgment: Judgment normal.       Ortho Exam      Bilateral hands- Sensation to light touch median, radial, ulnar nerve. Positive AIN, PIN, ulnar nerve motor function. Positive pulses. " Negative tinels. Negative compression testing. Pain to the CMC joint. Pain with CMC grind testing. Good strength in triceps, biceps, deltoid, wrist extensors and wrist flexors.       Small Joint Arthrocentesis: R thumb CMC  Consent given by: patient  Site marked: site marked  Timeout: Immediately prior to procedure a time out was called to verify the correct patient, procedure, equipment, support staff and site/side marked as required   Supporting Documentation  Indications: pain   Procedure Details  Location: thumb - R thumb CMC  Preparation: Patient was prepped and draped in the usual sterile fashion  Needle gauge: 23 G.  Medications administered: 1 mL lidocaine 1 %; 40 mg triamcinolone acetonide 40 MG/ML  Patient tolerance: patient tolerated the procedure well with no immediate complications    Small Joint Arthrocentesis: L thumb CMC  Consent given by: patient  Site marked: site marked  Timeout: Immediately prior to procedure a time out was called to verify the correct patient, procedure, equipment, support staff and site/side marked as required   Supporting Documentation  Indications: pain   Procedure Details  Location: thumb - L thumb CMC  Preparation: Patient was prepped and draped in the usual sterile fashion  Needle gauge: 23 G.  Medications administered: 1 mL lidocaine 1 %; 40 mg triamcinolone acetonide 40 MG/ML  Patient tolerance: patient tolerated the procedure well with no immediate complications            Imaging Results (Most Recent)     Procedure Component Value Units Date/Time    XR Hand 2 View Right [[id number]] Resulted: 01/26/23 1407     Updated: 01/26/23 1407    XR Hand 2 View Left [[id number]] Resulted: 01/26/23 1407     Updated: 01/26/23 1407           Result Review :     X-Ray Report:  Right hand  X-Ray  Indication: Evaluation of right hand pain  AP/Lateral view(s)  Findings: no acute fracture. Severe degenerative changes of the first CMC joint.   Prior studies available for comparison: yes      X-Ray Report:  Left hand  X-Ray  Indication: Evaluation of left hand pain  AP/Lateral view(s)  Findings: no acute fracture. Severe degenerative changes of the first CMC joint.   Prior studies available for comparison: yes         No results found.           Assessment and Plan     Diagnoses and all orders for this visit:    1. Left hand pain (Primary)  -     XR Hand 2 View Left    2. Right hand pain  -     XR Hand 2 View Right    Other orders  -     Small Joint Arthrocentesis: R thumb CMC  -     Small Joint Arthrocentesis: L thumb CMC        Discussed the treatment plan with the patient.  I reviewed the x-rays that were obtained today with the patient. Plan for conservative treatment. Plan to continue thumb-o-prene bracing, new brace given today. Discussed the risks and benefits of bilateral thumb CMC injections. The patient expressed understanding and wished to proceed. She tolerated the injections well.     Call or return if worsening symptoms.    Follow Up     PRN      Patient was given instructions and counseling regarding her condition or for health maintenance advice. Please see specific information pulled into the AVS if appropriate.     Scribed for Nabeel Perez MD by Jacki Amos.  01/26/23   14:34 EST    I have personally performed the services described in this document as scribed by the above individual and it is both accurate and complete. Nabeel Perez MD 01/27/23

## 2023-01-27 RX ORDER — LIDOCAINE HYDROCHLORIDE 10 MG/ML
1 INJECTION, SOLUTION INFILTRATION; PERINEURAL
Status: COMPLETED | OUTPATIENT
Start: 2023-01-26 | End: 2023-01-26

## 2023-01-27 RX ORDER — TRIAMCINOLONE ACETONIDE 40 MG/ML
40 INJECTION, SUSPENSION INTRA-ARTICULAR; INTRAMUSCULAR
Status: COMPLETED | OUTPATIENT
Start: 2023-01-26 | End: 2023-01-26

## 2023-03-23 NOTE — ASSESSMENT & PLAN NOTE
Is been about 3 years since she has had a DEXA scan.  We will update that and check her calcium and vitamin D levels as well.   Physical Therapy Evaluation    Patient Name:  Álvaro Carreon   MRN:  [id number]    Recommendations:     Discharge Recommendations:     Discharge Equipment Recommendations: none   Barriers to discharge: None    Assessment:     Álvaro Carreon is a 13 y.o. male admitted with a medical diagnosis of Displaced fracture of distal phalanx of right great toe, initial encounter for open fracture.  He presents with the following impairments/functional limitations:   Patient with good use of crutches nwb. Issued knee scooter rental    Rehab Prognosis: Good; patient would benefit from acute skilled PT services to address these deficits and reach maximum level of function.    Recent Surgery: Procedure(s) (LRB):  REPAIR, NAIL BED (Right)  ORIF,TOE (Right) Day of Surgery    Plan:     During this hospitalization, patient to be seen 1 x/week to address the identified rehab impairments via gait training, therapeutic activities and progress toward the following goals:    Plan of Care Expires:  03/23/23    Subjective     Chief Complaint: post op pain  Patient/Family Comments/goals: plan is for dc home  Pain/Comfort:  Pain Rating 1: 4/10  Location - Side 1: Right  Location 1: first toe  Pain Addressed 1: Cessation of Activity, Pre-medicate for activity    Patients cultural, spiritual, Mandaeism conflicts given the current situation: no    Living Environment:  Lives with family  Prior to admission, patients level of function was independent.  Equipment used at home: crutches.  DME owned (not currently used): none.  Upon discharge, patient will have assistance from mother.    Objective:     Communicated with nurse prior to session.  Patient found supine with    upon PT entry to room.    General Precautions: Standard, fall  Orthopedic Precautions:RLE non weight bearing   Braces:    Respiratory Status: Room air    Exams:  na    Functional Mobility:  Gait: ambulated 20 feet with crutches nwb      AM-PAC 6 CLICK MOBILITY  Total  Score:24       Treatment & Education:  Nwb with crutches, stair negotiation with demo of 1 step    Patient left supine with call button in reach.    GOALS:   Multidisciplinary Problems       Physical Therapy Goals       Not on file              Multidisciplinary Problems (Resolved)          Problem: Physical Therapy    Goal Priority Disciplines Outcome Goal Variances Interventions   Physical Therapy Goal   (Resolved)     PT, PT/OT Met     Description: Short Term Goals  Independent with HEP  Independent with crutchesx 10 feet NWB: right lowerl extremity    Long term goals  Needed equipment for home.                            History:     History reviewed. No pertinent past medical history.    History reviewed. No pertinent surgical history.    Time Tracking:     PT Received On: 03/23/23  PT Start Time: 1330     PT Stop Time: 1350  PT Total Time (min): 20 min     Billable Minutes: Evaluation 20      03/23/2023

## 2023-05-23 ENCOUNTER — TRANSCRIBE ORDERS (OUTPATIENT)
Dept: ADMINISTRATIVE | Facility: HOSPITAL | Age: 73
End: 2023-05-23
Payer: MEDICARE

## 2023-05-23 DIAGNOSIS — Z12.31 SCREENING MAMMOGRAM FOR BREAST CANCER: Primary | ICD-10-CM

## 2023-08-07 ENCOUNTER — OFFICE VISIT (OUTPATIENT)
Dept: ORTHOPEDIC SURGERY | Facility: CLINIC | Age: 73
End: 2023-08-07
Payer: MEDICARE

## 2023-08-07 VITALS — HEIGHT: 65 IN | WEIGHT: 141 LBS | BODY MASS INDEX: 23.49 KG/M2

## 2023-08-07 DIAGNOSIS — M79.641 RIGHT HAND PAIN: ICD-10-CM

## 2023-08-07 DIAGNOSIS — M79.642 LEFT HAND PAIN: ICD-10-CM

## 2023-08-07 DIAGNOSIS — M79.672 LEFT FOOT PAIN: Primary | ICD-10-CM

## 2023-08-07 RX ADMIN — BUPIVACAINE HYDROCHLORIDE 0.5 ML: 5 INJECTION, SOLUTION EPIDURAL; INTRACAUDAL at 16:52

## 2023-08-07 RX ADMIN — TRIAMCINOLONE ACETONIDE 40 MG: 40 INJECTION, SUSPENSION INTRA-ARTICULAR; INTRAMUSCULAR at 16:52

## 2023-08-07 NOTE — PROGRESS NOTES
"Chief Complaint  Follow-up of the Right Hand, Follow-up of the Left Hand, and Initial Evaluation of the Left Foot     Subjective      Audrey Emerson presents to [facility] for an evaluation for her left foot and bilateral hand pain. She reports she was playing golf when she twisted wrong and injured her ankle.     Allergies   Allergen Reactions    Gabapentin Swelling    Pentazocine Hallucinations        Social History     Socioeconomic History    Marital status:    Tobacco Use    Smoking status: Never    Smokeless tobacco: Never   Vaping Use    Vaping Use: Never used   Substance and Sexual Activity    Alcohol use: Never    Drug use: Never        I reviewed the patient's chief complaint, history of present illness, review of systems, past medical history, surgical history, family history, social history, medications, and allergy list.     Review of Systems     Constitutional: Denies fevers, chills, weight loss  Cardiovascular: Denies chest pain, shortness of breath  Skin: Denies rashes, acute skin changes  Neurologic: Denies headache, loss of consciousness  MSK: left foot pain and bilateral hand pain       Vital Signs:   Ht 165.1 cm (65\")   Wt 64 kg (141 lb)   BMI 23.46 kg/mý          Physical Exam  General: Alert. No acute distress    Ortho Exam        Left lower extremity: full range of motion to the ankle, no swelling or bruising, able to plantar flexion and dorsiflex her ankle, neurovascularly intact. Positive EHL, FHL, GS, and TA. Sensation intact to all 5 nerves of the foot.     Bilateral upper extremity: Sensation to light touch median, radial, ulnar nerve. Positive AIN, PIN, ulnar nerve motor function. Positive pulses. Negative tinels. Negative compression testing. Pain to the CMC joint. Pain with CMC grind testing. Good strength in triceps, biceps, deltoid, wrist extensors and wrist flexors.          Small Joint Arthrocentesis  Consent given by: patient  Site marked: " site marked  Timeout: Immediately prior to procedure a time out was called to verify the correct patient, procedure, equipment, support staff and site/side marked as required   Procedure Details  Location: thumb -   Preparation: Patient was prepped and draped in the usual sterile fashion  Needle gauge: 23.  Medications administered: 0.5 mL bupivacaine (PF) 0.5 %; 40 mg triamcinolone acetonide 40 MG/ML  Patient tolerance: patient tolerated the procedure well with no immediate complications      Small Joint Arthrocentesis  Consent given by: patient  Site marked: site marked  Timeout: Immediately prior to procedure a time out was called to verify the correct patient, procedure, equipment, support staff and site/side marked as required   Procedure Details  Location: thumb -   Preparation: Patient was prepped and draped in the usual sterile fashion  Needle gauge: 23g.  Medications administered: 40 mg triamcinolone acetonide 40 MG/ML; 0.5 mL bupivacaine (PF) 0.5 %  Patient tolerance: patient tolerated the procedure well with no immediate complications          Imaging Results (Most Recent)       Procedure Component Value Units Date/Time    XR Foot 3+ View Left [[id number]] Resulted: 08/07/23 1624     Updated: 08/07/23 1624             Result Review :       X-Ray Report:  Left foot X-Ray  Indication: Evaluation of the left foot.    AP/Lateral view(s)  Findings: Heel spur noted.  No fracture or dislocation.   Prior studies available for comparison: no        Assessment and Plan     Diagnoses and all orders for this visit:    1. Left foot pain (Primary)  -     XR Foot 3+ View Left      The patient presents here today for an evaluation for her left foot and bilateral hand pain. Discussed the risks and benefits of bilateral hand steroid injections. She expressed understanding and wishes to proceed. She tolerated the injections well. She will continue activities as tolerated for her foot.       Call or return if worsening  symptoms.    Follow Up     PRN       Patient was given instructions and counseling regarding her condition or for health maintenance advice. Please see specific information pulled into the AVS if appropriate.     Scribed for Nabeel Perez MD by Nicci Osorio.  08/07/23   16:37 EDT      I have personally performed the services described in this document as scribed by the above individual and it is both accurate and complete. Nabeel Perez MD 08/14/23

## 2023-08-14 RX ORDER — BUPIVACAINE HYDROCHLORIDE 5 MG/ML
0.5 INJECTION, SOLUTION EPIDURAL; INTRACAUDAL
Status: COMPLETED | OUTPATIENT
Start: 2023-08-07 | End: 2023-08-07

## 2023-08-14 RX ORDER — TRIAMCINOLONE ACETONIDE 40 MG/ML
40 INJECTION, SUSPENSION INTRA-ARTICULAR; INTRAMUSCULAR
Status: COMPLETED | OUTPATIENT
Start: 2023-08-07 | End: 2023-08-07

## 2023-09-06 RX ORDER — ROPINIROLE 1 MG/1
2 TABLET, FILM COATED ORAL
Qty: 90 TABLET | Refills: 3 | Status: SHIPPED | OUTPATIENT
Start: 2023-09-06 | End: 2024-03-04

## 2023-09-06 NOTE — TELEPHONE ENCOUNTER
Caller: Aurdey Emerson    Relationship: Self    Best call back number: [phone]     Requested Prescriptions:   Requested Prescriptions     Pending Prescriptions Disp Refills    rOPINIRole (REQUIP) 1 MG tablet 90 tablet 3     Sig: Take 2 tablets by mouth every night at bedtime for 180 days.        Pharmacy where request should be sent: [id number] - [place], [place] - [address] AT [facility] [phone] [facility] [phone]      Last office visit with prescribing clinician: 9/27/2022   Last telemedicine visit with prescribing clinician: Visit date not found   Next office visit with prescribing clinician: 9/28/2023     Additional details provided by patient: PATIENT STATED THAT SHE NOW TAKES 2 MG.    Does the patient have less than a 3 day supply:  [x] Yes  [] No    Would you like a call back once the refill request has been completed: [x] Yes [] No    If the office needs to give you a call back, can they leave a voicemail: [x] Yes [] No    Riddhi Munguia   09/06/23 09:00 EDT

## 2023-09-28 ENCOUNTER — OFFICE VISIT (OUTPATIENT)
Dept: FAMILY MEDICINE CLINIC | Facility: CLINIC | Age: 73
End: 2023-09-28
Payer: MEDICARE

## 2023-09-28 VITALS
HEART RATE: 54 BPM | TEMPERATURE: 98 F | HEIGHT: 65 IN | OXYGEN SATURATION: 96 % | WEIGHT: 143.25 LBS | SYSTOLIC BLOOD PRESSURE: 121 MMHG | BODY MASS INDEX: 23.87 KG/M2 | DIASTOLIC BLOOD PRESSURE: 77 MMHG

## 2023-09-28 DIAGNOSIS — Z12.31 ENCOUNTER FOR SCREENING MAMMOGRAM FOR MALIGNANT NEOPLASM OF BREAST: ICD-10-CM

## 2023-09-28 DIAGNOSIS — Z00.00 MEDICARE ANNUAL WELLNESS VISIT, SUBSEQUENT: Primary | ICD-10-CM

## 2023-09-28 PROBLEM — J30.2 SEASONAL ALLERGIC RHINITIS: Status: ACTIVE | Noted: 2023-09-28

## 2023-09-28 PROBLEM — L84 CORNS AND CALLUS: Status: ACTIVE | Noted: 2023-09-28

## 2023-09-28 PROBLEM — K28.9 GASTROINTESTINAL ULCER: Status: ACTIVE | Noted: 2023-09-28

## 2023-09-28 PROBLEM — M79.673 PAIN OF FOOT: Status: ACTIVE | Noted: 2019-12-17

## 2023-09-28 LAB
ALBUMIN SERPL-MCNC: 4.1 G/DL (ref 3.5–5.2)
ALBUMIN/GLOB SERPL: 1.5 G/DL
ALP SERPL-CCNC: 130 U/L (ref 39–117)
ALT SERPL W P-5'-P-CCNC: 25 U/L (ref 1–33)
ANION GAP SERPL CALCULATED.3IONS-SCNC: 8 MMOL/L (ref 5–15)
AST SERPL-CCNC: 24 U/L (ref 1–32)
BASOPHILS # BLD AUTO: 0.02 10*3/MM3 (ref 0–0.2)
BASOPHILS NFR BLD AUTO: 0.4 % (ref 0–1.5)
BILIRUB SERPL-MCNC: 0.4 MG/DL (ref 0–1.2)
BUN SERPL-MCNC: 20 MG/DL (ref 8–23)
BUN/CREAT SERPL: 22.7 (ref 7–25)
CALCIUM SPEC-SCNC: 9.3 MG/DL (ref 8.6–10.5)
CHLORIDE SERPL-SCNC: 106 MMOL/L (ref 98–107)
CHOLEST SERPL-MCNC: 165 MG/DL (ref 0–200)
CO2 SERPL-SCNC: 25 MMOL/L (ref 22–29)
CREAT SERPL-MCNC: 0.88 MG/DL (ref 0.57–1)
DEPRECATED RDW RBC AUTO: 42.8 FL (ref 37–54)
EGFRCR SERPLBLD CKD-EPI 2021: 69.9 ML/MIN/1.73
EOSINOPHIL # BLD AUTO: 0.1 10*3/MM3 (ref 0–0.4)
EOSINOPHIL NFR BLD AUTO: 1.8 % (ref 0.3–6.2)
ERYTHROCYTE [DISTWIDTH] IN BLOOD BY AUTOMATED COUNT: 12.3 % (ref 12.3–15.4)
GLOBULIN UR ELPH-MCNC: 2.8 GM/DL
GLUCOSE SERPL-MCNC: 88 MG/DL (ref 65–99)
HCT VFR BLD AUTO: 42.8 % (ref 34–46.6)
HDLC SERPL-MCNC: 58 MG/DL (ref 40–60)
HGB BLD-MCNC: 14.4 G/DL (ref 12–15.9)
IMM GRANULOCYTES # BLD AUTO: 0.03 10*3/MM3 (ref 0–0.05)
IMM GRANULOCYTES NFR BLD AUTO: 0.5 % (ref 0–0.5)
LDLC SERPL CALC-MCNC: 91 MG/DL (ref 0–100)
LDLC/HDLC SERPL: 1.55 {RATIO}
LYMPHOCYTES # BLD AUTO: 1.18 10*3/MM3 (ref 0.7–3.1)
LYMPHOCYTES NFR BLD AUTO: 21.5 % (ref 19.6–45.3)
MCH RBC QN AUTO: 31.8 PG (ref 26.6–33)
MCHC RBC AUTO-ENTMCNC: 33.6 G/DL (ref 31.5–35.7)
MCV RBC AUTO: 94.5 FL (ref 79–97)
MONOCYTES # BLD AUTO: 0.52 10*3/MM3 (ref 0.1–0.9)
MONOCYTES NFR BLD AUTO: 9.5 % (ref 5–12)
NEUTROPHILS NFR BLD AUTO: 3.65 10*3/MM3 (ref 1.7–7)
NEUTROPHILS NFR BLD AUTO: 66.3 % (ref 42.7–76)
NRBC BLD AUTO-RTO: 0 /100 WBC (ref 0–0.2)
PLATELET # BLD AUTO: 194 10*3/MM3 (ref 140–450)
PMV BLD AUTO: 12.3 FL (ref 6–12)
POTASSIUM SERPL-SCNC: 4.4 MMOL/L (ref 3.5–5.2)
PROT SERPL-MCNC: 6.9 G/DL (ref 6–8.5)
RBC # BLD AUTO: 4.53 10*6/MM3 (ref 3.77–5.28)
SODIUM SERPL-SCNC: 139 MMOL/L (ref 136–145)
TRIGL SERPL-MCNC: 86 MG/DL (ref 0–150)
TSH SERPL DL<=0.05 MIU/L-ACNC: 1.66 UIU/ML (ref 0.27–4.2)
VLDLC SERPL-MCNC: 16 MG/DL (ref 5–40)
WBC NRBC COR # BLD: 5.5 10*3/MM3 (ref 3.4–10.8)

## 2023-09-28 PROCEDURE — 85025 COMPLETE CBC W/AUTO DIFF WBC: CPT | Performed by: FAMILY MEDICINE

## 2023-09-28 PROCEDURE — 80053 COMPREHEN METABOLIC PANEL: CPT | Performed by: FAMILY MEDICINE

## 2023-09-28 PROCEDURE — 80061 LIPID PANEL: CPT | Performed by: FAMILY MEDICINE

## 2023-09-28 PROCEDURE — 84443 ASSAY THYROID STIM HORMONE: CPT | Performed by: FAMILY MEDICINE

## 2023-09-28 NOTE — ASSESSMENT & PLAN NOTE
Overall she is doing very well.  Her care gaps is incorrect.  She has had her pneumococcal series her shingles series and her tetanus.  These have all been done at her retail pharmacy.  She does need an update on her seasonal influenza vaccine as well as the seasonal COVID booster.

## 2023-09-28 NOTE — PROGRESS NOTES
AWV Documentation:   Patient Info:     I reviewed all aspects of the patient's history      Compared to 1 year ago, patient's physical health feels:  The same    Compared to 1 year ago, patient's mental health feels:  The same  Advanced Care Planning:     Was a discussion had with the patient regarding their ACP?: Yes      Details included:  Has an advance directive in EMR which is still valid  Vital Signs:     Blood Pressure Evaluation:  In the acceptable range  Health Risk Assessment:     Does the patient have evidence of cognitive impairment: No      Is aspirin on the med list: Yes      Aspirin use:  Aspirin use is indicated based on review of current medical condition(s). Pros and cons of this therapy have been discussed today. Benefits of this medication outweigh potential harm. Patient has been encouraged to continue taking this medication.    Subsequent Medicare Wellness Visit    Subjective    Audrey Emerson is a 73 y.o. female who presents for a Subsequent Medicare Wellness Visit.    The following portions of the patient's history were reviewed and   updated as appropriate: allergies, current medications, past family history, past medical history, past social history, past surgical history, and problem list.    Compared to one year ago, the patient feels her physical   health is the same.    Compared to one year ago, the patient feels her mental   health is the same.    Recent Hospitalizations:  She was not admitted to the hospital during the last year.       Current Medical Providers:  Patient Care Team:  Mathew Mehta DO as PCP - General (Family Medicine)    Outpatient Medications Prior to Visit   Medication Sig Dispense Refill    aspirin 81 MG EC tablet aspirin 81 mg oral tablet,delayed release (DR/EC) take 1 tablet (81 mg) by oral route once daily   Active      atenolol (TENORMIN) 25 MG tablet Take 1 tablet by mouth Daily. 90 tablet 3    cholecalciferol (VITAMIN D3) 25 MCG (1000 UT) tablet  Vitamin D3 1,000 unit (25 mcg) oral tablet take 1 tablet by oral route daily   Active      folic acid (FOLVITE) 1 MG tablet folic acid oral take 1  by oral route daily   Active      Multiple Vitamins-Minerals (MULTIVITAMIN ADULT EXTRA C PO) multivitamin Oral tablet take 1 tablet by oral route daily   Suspended      naproxen sodium (ALEVE) 220 MG tablet Aleve 220 mg oral tablet take 1 tablet by oral route daily   Active      Prenatal Vit-Fe Fumarate-FA (PRENATAL, CLASSIC, VITAMIN) 28-0.8 MG tablet tablet Take 1 tablet by mouth Daily.      promethazine (PHENERGAN) 25 MG tablet Take 1 tablet by mouth Every 6 (Six) Hours As Needed for Nausea or Vomiting. 15 tablet 1    rOPINIRole (REQUIP) 1 MG tablet Take 2 tablets by mouth every night at bedtime for 180 days. 90 tablet 3    vitamin B-12 (CYANOCOBALAMIN) 1000 MCG tablet Take 1 tablet by mouth Daily.      vitamin C (ASCORBIC ACID) 250 MG tablet Take 4 tablets by mouth Daily.      Zinc 50 MG capsule Take 50 tablets by mouth Daily.      estradiol (ESTRACE) 1 MG tablet Take 1 tablet by mouth Daily for 90 days. 90 tablet 3     No facility-administered medications prior to visit.       No opioid medication identified on active medication list. I have reviewed chart for other potential  high risk medication/s and harmful drug interactions in the elderly.        Aspirin is on active medication list. Aspirin use is indicated based on review of current medical condition/s. Pros and cons of this therapy have been discussed today. Benefits of this medication outweigh potential harm.  Patient has been encouraged to continue taking this medication.  .      Patient Active Problem List   Diagnosis    Precordial chest pain    JAIDA on CPAP    Jhonny-Danlos disease    Osteoporosis    Restless leg    Arthritis    Encounter for annual physical exam    Encounter for screening mammogram for malignant neoplasm of breast    Acute non-recurrent maxillary sinusitis    Medicare annual wellness  "visit, subsequent    Corns and callus    Gastrointestinal ulcer    Pain of foot    Seasonal allergic rhinitis     Advance Care Planning   Advance Care Planning     Advance Directive is on file.  ACP discussion was held with the patient during this visit. Patient has an advance directive in EMR which is still valid.      Objective    Vitals:    23 0841   BP: 121/77   Pulse: 54   Temp: 98 °F (36.7 °C)   SpO2: 96%   Weight: 65 kg (143 lb 4 oz)   Height: 165.1 cm (65\")     Estimated body mass index is 23.84 kg/m² as calculated from the following:    Height as of this encounter: 165.1 cm (65\").    Weight as of this encounter: 65 kg (143 lb 4 oz).    BMI is within normal parameters. No other follow-up for BMI required.      Does the patient have evidence of cognitive impairment? No          HEALTH RISK ASSESSMENT    Smoking Status:  Social History     Tobacco Use   Smoking Status Never   Smokeless Tobacco Never     Alcohol Consumption:  Social History     Substance and Sexual Activity   Alcohol Use Never     Fall Risk Screen:    STEADI Fall Risk Assessment was completed, and patient is at LOW risk for falls.Assessment completed on:2023    Depression Screenin/28/2023     8:00 AM   PHQ-2/PHQ-9 Depression Screening   Little Interest or Pleasure in Doing Things 0-->not at all   Feeling Down, Depressed or Hopeless 0-->not at all   PHQ-9: Brief Depression Severity Measure Score 0       Health Habits and Functional and Cognitive Screenin/28/2023     8:00 AM   Functional & Cognitive Status   Do you have difficulty preparing food and eating? No   Do you have difficulty bathing yourself, getting dressed or grooming yourself? No   Do you have difficulty using the toilet? No   Do you have difficulty moving around from place to place? No   Do you have trouble with steps or getting out of a bed or a chair? No   Current Diet Well Balanced Diet   Dental Exam Up to date   Eye Exam Up to date   Exercise (times " per week) 3 times per week   Current Exercises Include Walking;Treadmill;Yard Work        Exercise Comment golf 3-4 times a week   Do you need help using the phone?  No   Are you deaf or do you have serious difficulty hearing?  No   Do you need help to go to places out of walking distance? No   Do you need help shopping? No   Do you need help preparing meals?  No   Do you need help with housework?  No   Do you need help with laundry? No   Do you need help taking your medications? No   Do you need help managing money? No   Do you ever drive or ride in a car without wearing a seat belt? No   Have you felt unusual stress, anger or loneliness in the last month? No   Who do you live with? Spouse   If you need help, do you have trouble finding someone available to you? No   Have you been bothered in the last four weeks by sexual problems? No   Do you have difficulty concentrating, remembering or making decisions? No       Age-appropriate Screening Schedule:  Refer to the list below for future screening recommendations based on patient's age, sex and/or medical conditions. Orders for these recommended tests are listed in the plan section. The patient has been provided with a written plan.    Health Maintenance   Topic Date Due    COVID-19 Vaccine (1) Never done    TDAP/TD VACCINES (1 - Tdap) Never done    ZOSTER VACCINE (1 of 2) Never done    Pneumococcal Vaccine 65+ (1 - PCV) Never done    HEPATITIS C SCREENING  Never done    COLORECTAL CANCER SCREENING  04/12/2023    INFLUENZA VACCINE  10/01/2023    ANNUAL WELLNESS VISIT  09/28/2024    MAMMOGRAM  12/20/2024    DXA SCAN  12/20/2024                  CMS Preventative Services Quick Reference  Risk Factors Identified During Encounter  Immunizations Discussed/Encouraged: Influenza and COVID19  The above risks/problems have been discussed with the patient.  Pertinent information has been shared with the patient in the After Visit Summary.  An After Visit Summary and PPPS were  "made available to the patient.    Follow Up:   Next Medicare Wellness visit to be scheduled in 1 year.       Additional E&M Note during same encounter follows:  Patient has multiple medical problems which are significant and separately identifiable that require additional work above and beyond the Medicare Wellness Visit.      Chief Complaint  Medicare Wellness-subsequent    Subjective        HPI  Audrey Emerson is also being seen today for AMW    Review of Systems   Constitutional:  Negative for fatigue.   HENT:  Negative for congestion, postnasal drip and rhinorrhea.    Eyes:  Negative for visual disturbance.   Respiratory:  Negative for cough, chest tightness, shortness of breath and wheezing.    Cardiovascular:  Negative for chest pain and palpitations.   Gastrointestinal:  Negative for constipation and diarrhea.   Psychiatric/Behavioral:  The patient is not nervous/anxious.      Objective   Vital Signs:  /77   Pulse 54   Temp 98 °F (36.7 °C)   Ht 165.1 cm (65\")   Wt 65 kg (143 lb 4 oz)   SpO2 96%   BMI 23.84 kg/m²     Physical Exam  Vitals and nursing note reviewed.   Constitutional:       General: She is not in acute distress.     Appearance: Normal appearance. She is normal weight.   HENT:      Head: Normocephalic.      Right Ear: Tympanic membrane, ear canal and external ear normal.      Left Ear: Tympanic membrane, ear canal and external ear normal.      Nose: Nose normal.      Mouth/Throat:      Mouth: Mucous membranes are moist.      Pharynx: Oropharynx is clear.   Eyes:      General: No scleral icterus.     Conjunctiva/sclera: Conjunctivae normal.      Pupils: Pupils are equal, round, and reactive to light.   Cardiovascular:      Rate and Rhythm: Normal rate and regular rhythm.      Pulses: Normal pulses.      Heart sounds: Normal heart sounds. No murmur heard.  Pulmonary:      Effort: Pulmonary effort is normal.      Breath sounds: Normal breath sounds. No wheezing, rhonchi or rales. "   Musculoskeletal:      Cervical back: Neck supple. No rigidity or tenderness.   Lymphadenopathy:      Cervical: No cervical adenopathy.   Skin:     General: Skin is warm and dry.      Coloration: Skin is not jaundiced.      Findings: No rash.   Neurological:      General: No focal deficit present.      Mental Status: She is alert and oriented to person, place, and time.   Psychiatric:         Mood and Affect: Mood normal.         Thought Content: Thought content normal.         Judgment: Judgment normal.                       Assessment and Plan   Diagnoses and all orders for this visit:    1. Medicare annual wellness visit, subsequent (Primary)  Assessment & Plan:  Overall she is doing very well.  Her care gaps is incorrect.  She has had her pneumococcal series her shingles series and her tetanus.  These have all been done at her retail pharmacy.  She does need an update on her seasonal influenza vaccine as well as the seasonal COVID booster.    Orders:  -     Comprehensive Metabolic Panel  -     CBC & Differential  -     TSH  -     Lipid Panel    2. Encounter for screening mammogram for malignant neoplasm of breast  Assessment & Plan:  She has a mammogram scheduled and that is not until December of this year.      Other orders  -     Fluzone High-Dose 65+yrs ([id number])             Follow Up   No follow-ups on file.  Patient was given instructions and counseling regarding her condition or for health maintenance advice. Please see specific information pulled into the AVS if appropriate.

## 2023-10-02 ENCOUNTER — TELEPHONE (OUTPATIENT)
Dept: FAMILY MEDICINE CLINIC | Facility: CLINIC | Age: 73
End: 2023-10-02
Payer: MEDICARE

## 2023-10-02 RX ORDER — ESTRADIOL 1 MG/1
1 TABLET ORAL DAILY
Qty: 90 TABLET | Refills: 3 | Status: SHIPPED | OUTPATIENT
Start: 2023-10-02

## 2023-10-02 RX ORDER — ROPINIROLE 1 MG/1
2 TABLET, FILM COATED ORAL
Qty: 90 TABLET | Refills: 3 | Status: SHIPPED | OUTPATIENT
Start: 2023-10-02 | End: 2024-03-30

## 2023-10-02 RX ORDER — ATENOLOL 25 MG/1
25 TABLET ORAL DAILY
Qty: 90 TABLET | Refills: 3 | Status: SHIPPED | OUTPATIENT
Start: 2023-10-02

## 2023-10-02 NOTE — TELEPHONE ENCOUNTER
Patient said she needed refills at her last office visit on 09/28/2023. Atenolol, Ropinirole, and Estradiol to [facility] on [address]. It does look like the Ropinirole was already sent on 09/06/2023.

## 2023-10-19 ENCOUNTER — CLINICAL SUPPORT (OUTPATIENT)
Dept: GASTROENTEROLOGY | Facility: CLINIC | Age: 73
End: 2023-10-19
Payer: MEDICARE

## 2023-10-19 ENCOUNTER — PREP FOR SURGERY (OUTPATIENT)
Dept: OTHER | Facility: HOSPITAL | Age: 73
End: 2023-10-19
Payer: MEDICARE

## 2023-10-19 ENCOUNTER — TELEPHONE (OUTPATIENT)
Dept: GASTROENTEROLOGY | Facility: CLINIC | Age: 73
End: 2023-10-19
Payer: MEDICARE

## 2023-10-19 DIAGNOSIS — Z12.11 ENCOUNTER FOR SCREENING FOR MALIGNANT NEOPLASM OF COLON: Primary | ICD-10-CM

## 2023-10-19 RX ORDER — SODIUM PICOSULFATE, MAGNESIUM OXIDE, AND ANHYDROUS CITRIC ACID 12; 3.5; 1 G/175ML; G/175ML; MG/175ML
175 LIQUID ORAL TAKE AS DIRECTED
Qty: 350 ML | Refills: 0 | Status: SHIPPED | OUTPATIENT
Start: 2023-10-19

## 2023-10-19 NOTE — PROGRESS NOTES
Audrey MENJIVAR Ki  1950  73 y.o.    Reason for call: Recall- 10 yr recall, last colon   Prep prescribed: Clenpiq  Prep instructions reviewed with patient and sent to patient via [facility]  Is the patient currently on any injectable medications for weight loss or diabetes? No  Clearance needed? No  If yes, what clearance is needed? N/A  Clearance has been requested from n/a  The patient has been scheduled for: Colonoscopy  Family history of colon cancer? No  If yes, indicate relative: n/a  Family History   Problem Relation Age of Onset    Stroke Mother     Breast cancer Mother     Arthritis Mother     Osteoporosis Mother     Colon polyps Father         at autopsy    Cancer Sister     Arthritis Sister     Arthritis Brother     Heart disease Brother     Arthritis Daughter     Colon cancer Neg Hx      Past Medical History:   Diagnosis Date    Arthritis     Cholelithiasis surgery     Corns and callus     Jhonny-Danlos disease     Foot pain, bilateral 2019    Gastrointestinal ulcer     Mitral valve disorder     mitral valve prolapse    Osteoporosis     Restless leg     Seasonal allergies     Ulcer 1970     Allergies   Allergen Reactions    Gabapentin Swelling    Pentazocine Hallucinations     Past Surgical History:   Procedure Laterality Date    ANKLE SURGERY      BREAST BIOPSY Bilateral 1984    BREAST LUMPECTOMY       SECTION  1985    CHOLECYSTECTOMY  2007    COLONOSCOPY  2003    GALLBLADDER SURGERY      HYSTERECTOMY  1999    MYOMECTOMY  1983    SALPINGO OOPHORECTOMY      bso    SHOULDER SURGERY   1989    Repair     Social History     Socioeconomic History    Marital status:    Tobacco Use    Smoking status: Never     Passive exposure: Past    Smokeless tobacco: Never   Vaping Use    Vaping Use: Never used   Substance and Sexual Activity    Alcohol use: No    Drug use: Never    Sexual activity: Yes     Partners: Male     Birth control/protection: Post-menopausal,  Bilateral salpingectomy , Hysterectomy, Same-sex partner     Comment: on estradiol       Current Outpatient Medications:     aspirin 81 MG EC tablet, aspirin 81 mg oral tablet,delayed release (DR/EC) take 1 tablet (81 mg) by oral route once daily   Active, Disp: , Rfl:     atenolol (TENORMIN) 25 MG tablet, Take 1 tablet by mouth Daily., Disp: 90 tablet, Rfl: 3    cholecalciferol (VITAMIN D3) 25 MCG (1000 UT) tablet, Vitamin D3 1,000 unit (25 mcg) oral tablet take 1 tablet by oral route daily   Active, Disp: , Rfl:     estradiol (ESTRACE) 1 MG tablet, Take 1 tablet by mouth Daily., Disp: 90 tablet, Rfl: 3    folic acid (FOLVITE) 1 MG tablet, folic acid oral take 1  by oral route daily   Active, Disp: , Rfl:     Multiple Vitamins-Minerals (MULTIVITAMIN ADULT EXTRA C PO), multivitamin Oral tablet take 1 tablet by oral route daily   Suspended, Disp: , Rfl:     naproxen sodium (ALEVE) 220 MG tablet, Aleve 220 mg oral tablet take 1 tablet by oral route daily   Active, Disp: , Rfl:     Prenatal Vit-Fe Fumarate-FA (PRENATAL, CLASSIC, VITAMIN) 28-0.8 MG tablet tablet, Take 1 tablet by mouth Daily., Disp: , Rfl:     promethazine (PHENERGAN) 25 MG tablet, Take 1 tablet by mouth Every 6 (Six) Hours As Needed for Nausea or Vomiting., Disp: 15 tablet, Rfl: 1    rOPINIRole (REQUIP) 1 MG tablet, Take 2 tablets by mouth every night at bedtime for 180 days., Disp: 90 tablet, Rfl: 3    vitamin B-12 (CYANOCOBALAMIN) 1000 MCG tablet, Take 1 tablet by mouth Daily., Disp: , Rfl:     vitamin C (ASCORBIC ACID) 250 MG tablet, Take 4 tablets by mouth Daily., Disp: , Rfl:     Zinc 50 MG capsule, Take 50 tablets by mouth Daily., Disp: , Rfl:

## 2023-11-27 ENCOUNTER — TELEPHONE (OUTPATIENT)
Dept: ORTHOPEDIC SURGERY | Facility: CLINIC | Age: 73
End: 2023-11-27
Payer: MEDICARE

## 2023-11-27 NOTE — TELEPHONE ENCOUNTER
LVM FOR PATIENT THAT WE NEEDED TO CHANGE HER APT FROM RINKU VASQUEZ TO DR. HERNANDEZ AT THE [facility] [address] LOCATION ON 11-29-23 @ 10:15AM OK FOR HUB TO RELAY

## 2023-11-29 ENCOUNTER — OFFICE VISIT (OUTPATIENT)
Dept: ORTHOPEDIC SURGERY | Facility: CLINIC | Age: 73
End: 2023-11-29
Payer: MEDICARE

## 2023-11-29 VITALS
HEART RATE: 58 BPM | WEIGHT: 143 LBS | HEIGHT: 65 IN | SYSTOLIC BLOOD PRESSURE: 147 MMHG | DIASTOLIC BLOOD PRESSURE: 82 MMHG | OXYGEN SATURATION: 96 % | BODY MASS INDEX: 23.82 KG/M2

## 2023-11-29 DIAGNOSIS — M18.0 ARTHRITIS OF CARPOMETACARPAL (CMC) JOINTS OF BOTH THUMBS: Primary | ICD-10-CM

## 2023-11-29 RX ADMIN — TRIAMCINOLONE ACETONIDE 40 MG: 40 INJECTION, SUSPENSION INTRA-ARTICULAR; INTRAMUSCULAR at 10:15

## 2023-11-29 RX ADMIN — LIDOCAINE HYDROCHLORIDE 1 ML: 10 INJECTION, SOLUTION INFILTRATION; PERINEURAL at 10:15

## 2023-11-29 NOTE — PROGRESS NOTES
"Chief Complaint  Follow-up of the Right Hand and Follow-up of the Left Hand     Subjective      Audrey Emerson presents to [facility] for follow up of bilateral hands. She is here for bilateral hand steroid injections. She has pain in bilateral thumbs.  She cannot undo a jar lid and has difficulty picking up a coffee pot.  She is here today for bilateral CMC joint arthritis.  She has pain in the sciatic nerve in the right hip.     Allergies   Allergen Reactions    Gabapentin Swelling    Pentazocine Hallucinations        Social History     Socioeconomic History    Marital status:    Tobacco Use    Smoking status: Never     Passive exposure: Past    Smokeless tobacco: Never   Vaping Use    Vaping Use: Never used   Substance and Sexual Activity    Alcohol use: No    Drug use: Never    Sexual activity: Yes     Partners: Male     Birth control/protection: Post-menopausal, Bilateral salpingectomy , Hysterectomy, Same-sex partner     Comment: on estradiol        I reviewed the patient's chief complaint, history of present illness, review of systems, past medical history, surgical history, family history, social history, medications, and allergy list.     Review of Systems     Constitutional: Denies fevers, chills, weight loss  Cardiovascular: Denies chest pain, shortness of breath  Skin: Denies rashes, acute skin changes  Neurologic: Denies headache, loss of consciousness        Vital Signs:   /82   Pulse 58   Ht 165.1 cm (65\")   Wt 64.9 kg (143 lb)   SpO2 96%   BMI 23.80 kg/m²          Physical Exam  General: Alert. No acute distress    Ortho Exam        BILATERAL HANDS Negative Compression testing/ Negative Tinels. NegativeFinkelsteins. Negative House's testing. Positive CMC grind testing. Negative Phalens. Full ROM of the hand, fingers, elbow and wrist. Negative Triggering of the digit. Sensation grossly intact to light touch, median, radial and ulnar nerve. Positive AIN, " PIN and ulnar nerve motor function intact. Axillary nerve intact. Positive pulses.        Small Joint Arthrocentesis: R thumb CMC  Consent given by: patient  Site marked: site marked  Timeout: Immediately prior to procedure a time out was called to verify the correct patient, procedure, equipment, support staff and site/side marked as required   Supporting Documentation  Indications: pain   Procedure Details  Location: thumb - R thumb CMC  Preparation: Patient was prepped and draped in the usual sterile fashion  Needle gauge: 23G.  Medications administered: 1 mL lidocaine 1 %; 40 mg triamcinolone acetonide 40 MG/ML  Patient tolerance: patient tolerated the procedure well with no immediate complications      Small Joint LEFT THUMB: L thumb CMC  Consent given by: patient  Site marked: site marked  Timeout: Immediately prior to procedure a time out was called to verify the correct patient, procedure, equipment, support staff and site/side marked as required   Supporting Documentation  Indications: pain   Procedure Details  Location: thumb - L thumb CMC  Preparation: Patient was prepped and draped in the usual sterile fashion  Needle gauge: 23G.  Medications administered: 1 mL lidocaine 1 %; 40 mg triamcinolone acetonide 40 MG/ML  Patient tolerance: patient tolerated the procedure well with no immediate complications          Imaging Results (Most Recent)       None             Result Review :             Assessment and Plan     Diagnoses and all orders for this visit:    1. Arthritis of carpometacarpal (CMC) joints of both thumbs (Primary)        Discussed the treatment plan with the patient.     Discussed the risks and benefits of conservative measures. The patient expressed understanding and wished to proceed with bilateral CMC thumb injections.  She tolerated the injections well.       Call or return if worsening symptoms.    Follow Up     PRN      Patient was given instructions and counseling regarding her  condition or for health maintenance advice. Please see specific information pulled into the AVS if appropriate.     Scribed for Nabeel Perez MD by Chula Pickett MA.  11/29/23   10:28 EST    I have personally performed the services described in this document as scribed by the above individual and it is both accurate and complete. Nabeel Perez MD 11/30/23

## 2023-11-30 RX ORDER — LIDOCAINE HYDROCHLORIDE 10 MG/ML
1 INJECTION, SOLUTION INFILTRATION; PERINEURAL
Status: COMPLETED | OUTPATIENT
Start: 2023-11-29 | End: 2023-11-29

## 2023-11-30 RX ORDER — TRIAMCINOLONE ACETONIDE 40 MG/ML
40 INJECTION, SUSPENSION INTRA-ARTICULAR; INTRAMUSCULAR
Status: COMPLETED | OUTPATIENT
Start: 2023-11-29 | End: 2023-11-29

## 2023-12-05 ENCOUNTER — ANESTHESIA EVENT (OUTPATIENT)
Dept: GASTROENTEROLOGY | Facility: HOSPITAL | Age: 73
End: 2023-12-05
Payer: MEDICARE

## 2023-12-05 NOTE — ANESTHESIA PREPROCEDURE EVALUATION
Anesthesia Evaluation     NPO Solid Status: > 8 hours  NPO Liquid Status: > 2 hours           Airway   Mallampati: I  TM distance: >3 FB  Dental - normal exam     Pulmonary - normal exam   (+) ,sleep apnea on CPAP  Cardiovascular - normal exam        Neuro/Psych  GI/Hepatic/Renal/Endo    (+) PUD    Musculoskeletal         ROS comment: Jhonny Danlos  Abdominal    Substance History      OB/GYN          Other   arthritis,                       Anesthesia Plan    ASA 2     general   total IV anesthesia    Anesthetic plan, risks, benefits, and alternatives have been provided, discussed and informed consent has been obtained with: patient and other.  Pre-procedure education provided  Plan discussed with CRNA.        CODE STATUS:

## 2023-12-06 ENCOUNTER — HOSPITAL ENCOUNTER (OUTPATIENT)
Facility: HOSPITAL | Age: 73
Setting detail: HOSPITAL OUTPATIENT SURGERY
Discharge: HOME | End: 2023-12-06
Attending: INTERNAL MEDICINE | Admitting: INTERNAL MEDICINE
Payer: MEDICARE

## 2023-12-06 ENCOUNTER — ANESTHESIA (OUTPATIENT)
Dept: GASTROENTEROLOGY | Facility: HOSPITAL | Age: 73
End: 2023-12-06
Payer: MEDICARE

## 2023-12-06 VITALS
SYSTOLIC BLOOD PRESSURE: 114 MMHG | HEIGHT: 64 IN | WEIGHT: 141.54 LBS | RESPIRATION RATE: 14 BRPM | HEART RATE: 68 BPM | DIASTOLIC BLOOD PRESSURE: 87 MMHG | OXYGEN SATURATION: 100 % | TEMPERATURE: 97.9 F | BODY MASS INDEX: 24.16 KG/M2

## 2023-12-06 PROCEDURE — 25810000003 LACTATED RINGERS PER 1000 ML: Performed by: NURSE ANESTHETIST, CERTIFIED REGISTERED

## 2023-12-06 PROCEDURE — 25010000002 PROPOFOL 10 MG/ML EMULSION: Performed by: NURSE ANESTHETIST, CERTIFIED REGISTERED

## 2023-12-06 PROCEDURE — 25810000003 LACTATED RINGERS PER 1000 ML: Performed by: ANESTHESIOLOGY

## 2023-12-06 RX ORDER — SODIUM CHLORIDE, SODIUM LACTATE, POTASSIUM CHLORIDE, CALCIUM CHLORIDE 600; 310; 30; 20 MG/100ML; MG/100ML; MG/100ML; MG/100ML
30 INJECTION, SOLUTION INTRAVENOUS CONTINUOUS
Status: DISCONTINUED | OUTPATIENT
Start: 2023-12-06 | End: 2023-12-06 | Stop reason: HOSPADM

## 2023-12-06 RX ORDER — LIDOCAINE HYDROCHLORIDE 20 MG/ML
INJECTION, SOLUTION EPIDURAL; INFILTRATION; INTRACAUDAL; PERINEURAL AS NEEDED
Status: DISCONTINUED | OUTPATIENT
Start: 2023-12-06 | End: 2023-12-06 | Stop reason: SURG

## 2023-12-06 RX ORDER — SODIUM CHLORIDE, SODIUM LACTATE, POTASSIUM CHLORIDE, CALCIUM CHLORIDE 600; 310; 30; 20 MG/100ML; MG/100ML; MG/100ML; MG/100ML
INJECTION, SOLUTION INTRAVENOUS CONTINUOUS PRN
Status: DISCONTINUED | OUTPATIENT
Start: 2023-12-06 | End: 2023-12-06 | Stop reason: SURG

## 2023-12-06 RX ORDER — PROPOFOL 10 MG/ML
VIAL (ML) INTRAVENOUS AS NEEDED
Status: DISCONTINUED | OUTPATIENT
Start: 2023-12-06 | End: 2023-12-06 | Stop reason: SURG

## 2023-12-06 RX ADMIN — LIDOCAINE HYDROCHLORIDE 100 MG: 20 INJECTION, SOLUTION EPIDURAL; INFILTRATION; INTRACAUDAL; PERINEURAL at 10:42

## 2023-12-06 RX ADMIN — PROPOFOL 250 MCG/KG/MIN: 10 INJECTION, EMULSION INTRAVENOUS at 10:42

## 2023-12-06 RX ADMIN — SODIUM CHLORIDE, POTASSIUM CHLORIDE, SODIUM LACTATE AND CALCIUM CHLORIDE: 600; 310; 30; 20 INJECTION, SOLUTION INTRAVENOUS at 10:41

## 2023-12-06 RX ADMIN — PROPOFOL 50 MG: 10 INJECTION, EMULSION INTRAVENOUS at 10:42

## 2023-12-06 RX ADMIN — SODIUM CHLORIDE, POTASSIUM CHLORIDE, SODIUM LACTATE AND CALCIUM CHLORIDE 30 ML/HR: 600; 310; 30; 20 INJECTION, SOLUTION INTRAVENOUS at 10:09

## 2023-12-06 NOTE — H&P
ScreeningPre Procedure History & Physical    Chief Complaint:   Screening     Subjective     HPI:   Screening     Past Medical History:   Past Medical History:   Diagnosis Date    Acromioclavicular separation     Ankle sprain     Arthritis     Arthritis of back Teenager    Erhlers Danlos    Arthritis of neck     Bursitis of hip     Cervical disc disorder     Cholelithiasis surgery 2007    Corns and callus     Dislocated elbow     Dislocation of finger     Dislocation, shoulder     Jhonny-Danlos disease     Foot pain, bilateral 2019    Fracture, finger     Fracture, foot     Frozen shoulder +    Gastrointestinal ulcer     Hip arthrosis +    Knee sprain     Knee swelling +    Low back strain     Lumbosacral disc disease     Mitral valve disorder     mitral valve prolapse    Neck strain     Osteoporosis     Periarthritis of shoulder     Restless leg     Rotator cuff syndrome -    Seasonal allergies     Stress fracture     Tendinitis of knee     Tennis elbow     Ulcer 1970    Wrist sprain        Past Surgical History:  Past Surgical History:   Procedure Laterality Date    ANKLE SURGERY      BREAST BIOPSY Bilateral 1984    BREAST LUMPECTOMY       SECTION  1985    CHOLECYSTECTOMY  2007    COLONOSCOPY  2003    GALLBLADDER SURGERY      HYSTERECTOMY      MYOMECTOMY  1983    SALPINGO OOPHORECTOMY      bso    SHOULDER SURGERY  1996    Repair    TRIGGER POINT INJECTION         Family History:  Family History   Problem Relation Age of Onset    Stroke Mother     Breast cancer Mother     Arthritis Mother     Osteoporosis Mother     Cancer Mother         Meningoma brain    Clotting disorder Mother         Jhonny Danlos    Collagen disease Mother         Jhonny Danlos    Dislocations Mother     Scoliosis Mother     Severe sprains Mother     Colon polyps Father         at autopsy    Cancer Father         Myleofibrosis    Cancer Sister         breast cancer x2    Arthritis Sister      Anesthesia problems Sister     Arthritis Brother     Heart disease Brother     Cancer Brother         Liver brain cancer    Arthritis Daughter     Broken bones Brother     Colon cancer Neg Hx        Social History:   reports that she has never smoked. She has been exposed to tobacco smoke. She has never used smokeless tobacco. She reports that she does not drink alcohol and does not use drugs.    Medications:   Medications Prior to Admission   Medication Sig Dispense Refill Last Dose    aspirin 81 MG EC tablet aspirin 81 mg oral tablet,delayed release (DR/EC) take 1 tablet (81 mg) by oral route once daily   Active   Past Week    atenolol (TENORMIN) 25 MG tablet Take 1 tablet by mouth Daily. 90 tablet 3 12/5/2023    cholecalciferol (VITAMIN D3) 25 MCG (1000 UT) tablet Vitamin D3 1,000 unit (25 mcg) oral tablet take 1 tablet by oral route daily   Active   12/5/2023    estradiol (ESTRACE) 1 MG tablet Take 1 tablet by mouth Daily. 90 tablet 3 12/5/2023    folic acid (FOLVITE) 1 MG tablet folic acid oral take 1  by oral route daily   Active   12/5/2023    Multiple Vitamins-Minerals (MULTIVITAMIN ADULT EXTRA C PO) multivitamin Oral tablet take 1 tablet by oral route daily   Suspended   12/5/2023    naproxen sodium (ALEVE) 220 MG tablet Aleve 220 mg oral tablet take 1 tablet by oral route daily   Active   12/5/2023    Prenatal Vit-Fe Fumarate-FA (PRENATAL, CLASSIC, VITAMIN) 28-0.8 MG tablet tablet Take 1 tablet by mouth Daily.   Past Week    rOPINIRole (REQUIP) 1 MG tablet Take 2 tablets by mouth every night at bedtime for 180 days. 90 tablet 3 12/5/2023    vitamin B-12 (CYANOCOBALAMIN) 1000 MCG tablet Take 1 tablet by mouth Daily.   12/5/2023    vitamin C (ASCORBIC ACID) 250 MG tablet Take 4 tablets by mouth Daily.   12/5/2023    Zinc 50 MG capsule Take 50 tablets by mouth Daily.   12/5/2023    promethazine (PHENERGAN) 25 MG tablet Take 1 tablet by mouth Every 6 (Six) Hours As Needed for Nausea or Vomiting. 15 tablet 1  "More than a month       Allergies:  Gabapentin and Pentazocine        Objective     Blood pressure 130/82, pulse 56, temperature 98.5 °F (36.9 °C), temperature source Temporal, resp. rate 18, height 162.6 cm (64\"), weight 64.2 kg (141 lb 8.6 oz), SpO2 97%.    Physical Exam   Constitutional: Pt is oriented to person, place, and time and well-developed, well-nourished, and in no distress.   Mouth/Throat: Oropharynx is clear and moist.   Neck: Normal range of motion.   Cardiovascular: Normal rate, regular rhythm and normal heart sounds.    Pulmonary/Chest: Effort normal and breath sounds normal.   Abdominal: Soft. Nontender  Skin: Skin is warm and dry.   Psychiatric: Mood, memory, affect and judgment normal.     Assessment & Plan     Diagnosis:  Screening colonoscopy       Anticipated Surgical Procedure:  colonoscopy    The risks, benefits, and alternatives of this procedure have been discussed with the patient or the responsible party- the patient understands and agrees to proceed.            "

## 2023-12-06 NOTE — ANESTHESIA POSTPROCEDURE EVALUATION
Patient: Audrey Emerson    Procedure Summary       Date: 12/06/23 Room / Location: [facility] ENDOSCOPY 2 / [facility] ENDOSCOPY    Anesthesia Start: 1041 Anesthesia Stop: 1104    Procedure: COLONOSCOPY Diagnosis:       Encounter for screening for malignant neoplasm of colon      (Encounter for screening for malignant neoplasm of colon [Z12.11])    Surgeons: Raul Laughlin MD Provider: Kaia Coulter CRNA    Anesthesia Type: general ASA Status: 2            Anesthesia Type: general    Vitals  Vitals Value Taken Time   /68 12/06/23 1102   Temp 36.7 °C (98 °F) 12/06/23 1102   Pulse 64 12/06/23 1104   Resp 16 12/06/23 1102   SpO2 96 % 12/06/23 1104   Vitals shown include unfiled device data.        Post Anesthesia Care and Evaluation    Post-procedure mental status: acceptable.  Pain management: satisfactory to patient    Airway patency: patent  Anesthetic complications: No anesthetic complications    Cardiovascular status: acceptable  Respiratory status: acceptable    Comments: Per chart review

## 2023-12-22 ENCOUNTER — HOSPITAL ENCOUNTER (OUTPATIENT)
Dept: ULTRASOUND IMAGING | Facility: HOSPITAL | Age: 73
Discharge: HOME | End: 2023-12-22
Payer: MEDICARE

## 2023-12-22 ENCOUNTER — HOSPITAL ENCOUNTER (OUTPATIENT)
Dept: MAMMOGRAPHY | Facility: HOSPITAL | Age: 73
Discharge: HOME | End: 2023-12-22
Payer: MEDICARE

## 2023-12-22 DIAGNOSIS — Z12.31 SCREENING MAMMOGRAM FOR BREAST CANCER: ICD-10-CM

## 2023-12-22 PROCEDURE — G0279 TOMOSYNTHESIS, MAMMO: HCPCS

## 2023-12-22 PROCEDURE — 76642 ULTRASOUND BREAST LIMITED: CPT

## 2023-12-22 PROCEDURE — 77066 DX MAMMO INCL CAD BI: CPT

## 2023-12-27 ENCOUNTER — TELEPHONE (OUTPATIENT)
Dept: SLEEP MEDICINE | Facility: HOSPITAL | Age: 73
End: 2023-12-27
Payer: MEDICARE

## 2024-03-04 ENCOUNTER — OFFICE VISIT (OUTPATIENT)
Dept: SLEEP MEDICINE | Facility: HOSPITAL | Age: 74
End: 2024-03-04
Payer: MEDICARE

## 2024-03-04 VITALS
HEIGHT: 65 IN | SYSTOLIC BLOOD PRESSURE: 119 MMHG | OXYGEN SATURATION: 98 % | BODY MASS INDEX: 23.32 KG/M2 | DIASTOLIC BLOOD PRESSURE: 72 MMHG | WEIGHT: 140 LBS | HEART RATE: 68 BPM

## 2024-03-04 DIAGNOSIS — G47.33 OSA ON CPAP: Primary | ICD-10-CM

## 2024-03-04 PROCEDURE — 1160F RVW MEDS BY RX/DR IN RCRD: CPT | Performed by: INTERNAL MEDICINE

## 2024-03-04 PROCEDURE — 1159F MED LIST DOCD IN RCRD: CPT | Performed by: INTERNAL MEDICINE

## 2024-03-04 PROCEDURE — 99213 OFFICE O/P EST LOW 20 MIN: CPT | Performed by: INTERNAL MEDICINE

## 2024-03-04 PROCEDURE — G0463 HOSPITAL OUTPT CLINIC VISIT: HCPCS

## 2024-03-04 NOTE — PROGRESS NOTES
"  [address] [address]  Phone: [phone]  Fax: [phone]      SLEEP CLINIC FOLLOW UP PROGRESS NOTE.    Audrey Emerson  [id number]   1950  73 y.o.  female      PCP: Mathew Mehta DO      Date of visit: 3/4/2024    Chief Complaint   Patient presents with    Sleep Apnea       HPI:  This is a 73 y.o. years old patient is here for the management of obstructive sleep apnea.  Sleep apnea is moderate in severity with a AHI of 15/hr.Patient is using positive airway pressure therapy and the symptoms of sleep apnea have improved significantly on auto CPAP. Normally patient goes to bed at 10 PM PM and wakes up at 5 AM AM .  The patient wakes up 1-2 time(s) during the night and has no problem going back to sleep.  Feels refreshed after waking up.      Patient also has restless leg syndrome she is on Requip 1 mg for the past 4 years but now she says that her symptoms are not well controlled which is interfering with her quality of sleep..     Medications and allergies are reviewed by me and documented in the encounter.     SOCIAL ( habits pertaining to sleep medicine)  History of tobacco use:No   History of alcohol use: 0 per week  Caffeine use: 2    REVIEW OF SYSTEMS:   Pertaining positive symptoms:  [place] Sleepiness Scale :Total score: 0   Crawly feeling in the legs      PHYSICAL EXAMINATION:  CONSTITUTIONAL:  Vitals:    03/04/24 1500   BP: 119/72   Pulse: 68   SpO2: 98%   Weight: 63.5 kg (140 lb)   Height: 165.1 cm (65\")    Body mass index is 23.3 kg/m².   NOSE: nasal passages are clear, No deformities noted   RESP SYSTEM: Not in any respiratory distress, no chest deformities noted,   CARDIOVASULAR: No edema noted  NEURO: Oriented x 3, gait normal,  Mood and affect appeared appropriate      Data reviewed:  The Smart card downloaded on 3/4/2024 has been reviewed independently by me for compliance and discussed the data with the patient. "   Compliance; 100%  More than 4 hr use, 96 %  Average use of the device 6 hours and 38 minutes per night  Residual AHI: 2.1 /hr (goal < 5.0 /hr)  Mask type: Nasal cradle  Device: DreamStation  DME: [employer]    She is eligible for a new CPAP in 2025      ASSESSMENT AND PLAN:  Obstructive sleep apnea ( G 47.33).  The symptoms of sleep apnea have improved with the device and the treatment.  Patient's compliance with the device is excellent for treatment of sleep apnea.  I have independently reviewed the smart card down load and discussed with the patient the download data and encouarged the patient to continue to use the device.The residual AHI is acceptable. The device is benefiting the patient and the device is medically necessary.  Without proper control of sleep apnea and good compliance there is a increased risk for hypertension, diabetes mellitus and nonrestorative sleep with hypersomnia which can increase risk for motor vehicle accidents.  Untreated sleep apnea is also a risk factor for development of atrial fibrillation, pulmonary hypertension, insulin resistance and stroke. The patient is also instructed to get the supplies from the DME company and and change them on a regular basis.  A prescription for supplies has been sent to the DME company.  I have also discussed the good sleep hygiene habits and adequate amount of sleep needed for good health.  Restless leg syndrome, she is on Requip  Return in about 1 year (around 3/4/2025) for with smart card down load. . Patient's questions were answered.      Kenzie Alvarez MD  Sleep Medicine  Medical Director, [facility]  3/4/2024

## 2024-03-20 RX ORDER — ROPINIROLE 1 MG/1
2 TABLET, FILM COATED ORAL
Qty: 90 TABLET | Refills: 1 | Status: SHIPPED | OUTPATIENT
Start: 2024-03-20 | End: 2024-09-16

## 2024-03-27 ENCOUNTER — HOSPITAL ENCOUNTER (EMERGENCY)
Facility: HOSPITAL | Age: 74
Discharge: HOME | End: 2024-03-27
Attending: EMERGENCY MEDICINE | Admitting: EMERGENCY MEDICINE
Payer: MEDICARE

## 2024-03-27 ENCOUNTER — APPOINTMENT (OUTPATIENT)
Dept: GENERAL RADIOLOGY | Facility: HOSPITAL | Age: 74
End: 2024-03-27
Payer: MEDICARE

## 2024-03-27 VITALS
HEIGHT: 64 IN | BODY MASS INDEX: 25.52 KG/M2 | OXYGEN SATURATION: 97 % | SYSTOLIC BLOOD PRESSURE: 136 MMHG | TEMPERATURE: 97.4 F | HEART RATE: 72 BPM | DIASTOLIC BLOOD PRESSURE: 83 MMHG | RESPIRATION RATE: 20 BRPM | WEIGHT: 149.47 LBS

## 2024-03-27 DIAGNOSIS — M62.838 TRAPEZIUS MUSCLE SPASM: ICD-10-CM

## 2024-03-27 DIAGNOSIS — T14.8XXA MUSCLE STRAIN: Primary | ICD-10-CM

## 2024-03-27 PROCEDURE — 25010000002 KETOROLAC TROMETHAMINE PER 15 MG: Performed by: NURSE PRACTITIONER

## 2024-03-27 PROCEDURE — 63710000001 PREDNISONE PER 5 MG: Performed by: NURSE PRACTITIONER

## 2024-03-27 PROCEDURE — 99283 EMERGENCY DEPT VISIT LOW MDM: CPT

## 2024-03-27 PROCEDURE — 73030 X-RAY EXAM OF SHOULDER: CPT

## 2024-03-27 PROCEDURE — 96372 THER/PROPH/DIAG INJ SC/IM: CPT

## 2024-03-27 RX ORDER — KETOROLAC TROMETHAMINE 15 MG/ML
15 INJECTION, SOLUTION INTRAMUSCULAR; INTRAVENOUS ONCE
Status: COMPLETED | OUTPATIENT
Start: 2024-03-27 | End: 2024-03-27

## 2024-03-27 RX ORDER — METHYLPREDNISOLONE 4 MG/1
TABLET ORAL
Qty: 21 TABLET | Refills: 0 | Status: SHIPPED | OUTPATIENT
Start: 2024-03-27

## 2024-03-27 RX ORDER — METHOCARBAMOL 500 MG/1
500 TABLET, FILM COATED ORAL 3 TIMES DAILY PRN
Qty: 15 TABLET | Refills: 0 | Status: SHIPPED | OUTPATIENT
Start: 2024-03-27

## 2024-03-27 RX ORDER — METHOCARBAMOL 500 MG/1
500 TABLET, FILM COATED ORAL ONCE
Status: COMPLETED | OUTPATIENT
Start: 2024-03-27 | End: 2024-03-27

## 2024-03-27 RX ADMIN — METHOCARBAMOL 500 MG: 500 TABLET ORAL at 06:34

## 2024-03-27 RX ADMIN — KETOROLAC TROMETHAMINE 15 MG: 15 INJECTION, SOLUTION INTRAMUSCULAR; INTRAVENOUS at 06:34

## 2024-03-27 RX ADMIN — PREDNISONE 60 MG: 50 TABLET ORAL at 06:34

## 2024-03-27 NOTE — ED TRIAGE NOTES
Pt comes to the ER tonight for left shoulder pain. Pt states that yesterday she was moving furniture but didn't notice that she had injured it. Pt states that yesterday she noticed that her left shoulder is causing her some pain, pt states its causing her to not be able to take a deep breath.

## 2024-03-27 NOTE — ED PROVIDER NOTES
Time: 5:35 AM EDT  Date of encounter:  3/27/2024  Independent Historian/Clinical History and Information was obtained by:   Patient    History is limited by: N/A    Chief Complaint: Shoulder injury      History of Present Illness:  Patient is a 73 y.o. year old female who presents to the emergency department for evaluation of left shoulder injury.  Patient states she was doing housework and moving some furniture around the night before when she woke up she realized she was not really having pain but gradually throughout the day started having pain in her left upper back posterior shoulder area.  Worse with movement.  Worse with touch.  Rotation of head towards the left side hurts.  No numbness tingling or weakness.  No chest pain.  Hurts when bends or takes a deep breath.  Patient tried Tylenol Aleve and Motrin.  She has a Lidoderm patch that she bought over-the-counter and tried to put Biofreeze on.  At times it will feel like it spasms if she moves a certain way.  Patient is right-handed    HPI    Patient Care Team  Primary Care Provider: Mathew Mehta DO    Past Medical History:     Allergies   Allergen Reactions    Pentazocine Hallucinations     Past Medical History:   Diagnosis Date    Acromioclavicular separation     Ankle sprain     Arthritis     Arthritis of back Teenager    Erhlers Danlos    Arthritis of neck     Bursitis of hip     Cervical disc disorder     Cholelithiasis surgery 2007    Corns and callus     Dislocated elbow     Dislocation of finger     Dislocation, shoulder     Jhonny-Danlos disease     Foot pain, bilateral 12/17/2019    Fracture, finger     Fracture, foot     Frozen shoulder 1980+    Gastrointestinal ulcer     Hip arthrosis 1980+    Knee sprain     Knee swelling 1960+    Low back strain     Lumbosacral disc disease     Mitral valve disorder     mitral valve prolapse    Neck strain     Osteoporosis     Periarthritis of shoulder     Restless leg     Rotator cuff syndrome  1970-    Seasonal allergies     Stress fracture     Tendinitis of knee     Tennis elbow     Ulcer 1970    Wrist sprain      Past Surgical History:   Procedure Laterality Date    ANKLE SURGERY      BREAST BIOPSY Bilateral 1984    BREAST LUMPECTOMY       SECTION  1985    CHOLECYSTECTOMY      COLONOSCOPY  2003    COLONOSCOPY N/A 2023    Procedure: COLONOSCOPY;  Surgeon: Raul Laughlin MD;  Location: [facility] ENDOSCOPY;  Service: Gastroenterology;  Laterality: N/A;  DIVERTICULOSIS    GALLBLADDER SURGERY      HYSTERECTOMY      MYOMECTOMY      SALPINGO OOPHORECTOMY      bso    SHOULDER SURGERY  1996    Repair    TRIGGER POINT INJECTION       Family History   Problem Relation Age of Onset    Stroke Mother     Breast cancer Mother     Arthritis Mother     Osteoporosis Mother     Cancer Mother         Meningoma brain    Clotting disorder Mother         Jhonny Danlos    Collagen disease Mother         Jhonny Danlos    Dislocations Mother     Scoliosis Mother     Severe sprains Mother     Colon polyps Father         at autopsy    Cancer Father         Myleofibrosis    Cancer Sister         breast cancer x2    Arthritis Sister     Anesthesia problems Sister     Arthritis Brother     Heart disease Brother     Cancer Brother         Liver brain cancer    Arthritis Daughter     Broken bones Brother     Colon cancer Neg Hx        Home Medications:  Prior to Admission medications    Medication Sig Start Date End Date Taking? Authorizing Provider   aspirin 81 MG EC tablet aspirin 81 mg oral tablet,delayed release (DR/EC) take 1 tablet (81 mg) by oral route once daily   Active    Provider, MD Dimas   atenolol (TENORMIN) 25 MG tablet Take 1 tablet by mouth Daily. 10/2/23   Mathew Mehta,    cholecalciferol (VITAMIN D3) 25 MCG (1000 UT) tablet Vitamin D3 1,000 unit (25 mcg) oral tablet take 1 tablet by oral route daily   Active    Provider, MD Dimas   estradiol  (ESTRACE) 1 MG tablet Take 1 tablet by mouth Daily. 10/2/23   Mathew Mehta DO   folic acid (FOLVITE) 1 MG tablet folic acid oral take 1  by oral route daily   Active    Dimas Lopez MD   Multiple Vitamins-Minerals (MULTIVITAMIN ADULT EXTRA C PO) multivitamin Oral tablet take 1 tablet by oral route daily   Suspended    Dimas Lopez MD   naproxen sodium (ALEVE) 220 MG tablet Aleve 220 mg oral tablet take 1 tablet by oral route daily   Active    Dimas Lopez MD   Prenatal Vit-Fe Fumarate-FA (PRENATAL, CLASSIC, VITAMIN) 28-0.8 MG tablet tablet Take 1 tablet by mouth Daily.    Dimas Lopez MD   promethazine (PHENERGAN) 25 MG tablet Take 1 tablet by mouth Every 6 (Six) Hours As Needed for Nausea or Vomiting. 9/27/22   Mathew Mehta DO   rOPINIRole (REQUIP) 1 MG tablet Take 2 tablets by mouth every night at bedtime for 180 days. 3/20/24 9/16/24  Mathew Mehta DO   vitamin B-12 (CYANOCOBALAMIN) 1000 MCG tablet Take 1 tablet by mouth Daily.    Dimas Lopez MD   vitamin C (ASCORBIC ACID) 250 MG tablet Take 4 tablets by mouth Daily.    Dimas Lopez MD   Zinc 50 MG capsule Take 50 tablets by mouth Daily.    Dimas Lopez MD        Social History:   Social History     Tobacco Use    Smoking status: Never     Passive exposure: Past    Smokeless tobacco: Never   Vaping Use    Vaping status: Never Used   Substance Use Topics    Alcohol use: No    Drug use: Never         Review of Systems:  Review of Systems   Constitutional:  Negative for fever.   Respiratory:  Negative for shortness of breath.    Cardiovascular:  Negative for chest pain.   Gastrointestinal:  Negative for abdominal pain.   Genitourinary:  Negative for flank pain.   Musculoskeletal:  Positive for arthralgias (Left upper posterior shoulder) and back pain (Left upper back).   Skin: Negative.    Neurological:  Negative for weakness and numbness.   Hematological: Negative.   "  Psychiatric/Behavioral: Negative.     All other systems reviewed and are negative.       Physical Exam:  /83 (BP Location: Right arm, Patient Position: Sitting)   Pulse 72   Temp 97.4 °F (36.3 °C) (Oral)   Resp 20   Ht 162.6 cm (64\")   Wt 67.8 kg (149 lb 7.6 oz)   SpO2 97%   BMI 25.66 kg/m²     Physical Exam  Vitals and nursing note reviewed.   Constitutional:       Appearance: Normal appearance.   HENT:      Head: Atraumatic.   Eyes:      Conjunctiva/sclera: Conjunctivae normal.   Cardiovascular:      Pulses: Normal pulses.   Pulmonary:      Effort: Pulmonary effort is normal.   Musculoskeletal:         General: Tenderness (tenderness medial aspect of left scapular margin.soft tissue trapezius muscle.  Worse with movement of left shoulder well as flexion of neck or rotation towards the left side.  Area of tenderness is extremely focal and is a small areaabout 2 to 3 inches) present.      Cervical back: Normal range of motion. No tenderness.   Skin:     General: Skin is warm and dry.      Capillary Refill: Capillary refill takes less than 2 seconds.   Neurological:      General: No focal deficit present.      Mental Status: She is alert.      Sensory: No sensory deficit.      Motor: No weakness.   Psychiatric:         Mood and Affect: Mood normal.         Behavior: Behavior normal.            Medical Decision Making:      Comorbidities that affect care:    Arthritis, lumbosacral disc disease, rotator cuff syndrome, frozen shoulder, AC joint separation    External Notes reviewed:    Previous Clinic Note: Patient last seen on March 4 by her sleep apnea MD for management for management      The following orders were placed and all results were independently analyzed by me:  Orders Placed This Encounter   Procedures    XR Shoulder 2+ View Left   Patient last seen by sleep medicine for obstructive sleep apnea    Medications Given in the Emergency Department:  Medications   ketorolac (TORADOL) injection 15 " mg (has no administration in time range)   predniSONE (DELTASONE) tablet 60 mg (has no administration in time range)   methocarbamol (ROBAXIN) tablet 500 mg (has no administration in time range)        ED Course:    ED Course as of 03/27/24 0603   Wed Mar 27, 2024   0541 XR Shoulder 2+ View Left  Mild degenerative changes.  No acute abnormality or fracture [DS]      ED Course User Index  [DS] Raiza MI Soliz       Labs:    Lab Results (last 24 hours)       ** No results found for the last 24 hours. **             Imaging:    XR Shoulder 2+ View Left    Result Date: 3/27/2024  XR SHOULDER 2+ VW LEFT-  Date of Exam: 3/27/2024 1:04 AM  Indication: Left shoulder pain after moving furniture.  Comparison: None available.  Findings: 4 views of the left shoulder reveal no acute fracture and no acute malalignment. Mild degenerative changes are seen. No retained radiopaque foreign body is suggested.      No acute fracture or acute malalignment.   Electronically Signed By-Zaheer Melton MD On:3/27/2024 1:46 AM         Differential Diagnosis and Discussion:    Back Pain: The patient presents with back pain. My differential diagnosis includes but is not limited to acute spinal epidural abscess, acute spinal epidural bleed, cauda equina syndrome, abdominal aortic aneurysm, aortic dissection, kidney stone, pyelonephritis, musculoskeletal back pain, spinal fracture, and osteoarthritis.   Extremity Pain: Differential diagnosis includes but is not limited to soft tissue sprain, tendonitis, tendon injury, dislocation, fracture, deep vein thrombosis, arterial insufficiency, osteoarthritis, bursitis, and ligamentous damage.    All X-rays impressions were independently interpreted by me.    MDM  Number of Diagnoses or Management Options  Muscle strain  Trapezius muscle spasm  Diagnosis management comments: I have explained the patient´s condition, diagnoses and treatment plan based on the information available to me at this time. I  have answered questions and addressed any concerns. The patient has a good  understanding of the patient´s diagnosis, condition, and treatment plan as can be expected at this point. The vital signs have been stable. The patient´s condition is stable and appropriate for discharge from the emergency department.      The patient will pursue further outpatient evaluation with the primary care physician or other designated or consulting physician as outlined in the discharge instructions. They are agreeable to this plan of care and follow-up instructions have been explained in detail. The patient has received these instructions in written format and have expressed an understanding of the discharge instructions. The patient is aware that any significant change in condition or worsening of symptoms should prompt an immediate return to this or the closest emergency department or call to 911.       Amount and/or Complexity of Data Reviewed  Tests in the radiology section of CPT®: reviewed and ordered  Tests in the medicine section of CPT®: ordered and reviewed    Risk of Complications, Morbidity, and/or Mortality  Presenting problems: low  Diagnostic procedures: low  Management options: low    Patient Progress  Patient progress: stable         Patient Care Considerations:    NARCOTICS: I considered prescribing opiate pain medication as an outpatient, however no acute bony abnormality.  Symptoms are consistent with muscle strains with muscle relaxers will be better.  Did not want to do muscle relaxers and narcotics which both can be sedating      Consultants/Shared Management Plan:    None    Social Determinants of Health:    Patient is independent, reliable, and has access to care.       Disposition and Care Coordination:    Discharged: The patient is suitable and stable for discharge with no need for consideration of admission.    I have explained the patient´s condition, diagnoses and treatment plan based on the  information available to me at this time. I have answered questions and addressed any concerns. The patient has a good  understanding of the patient´s diagnosis, condition, and treatment plan as can be expected at this point. The vital signs have been stable. The patient´s condition is stable and appropriate for discharge from the emergency department.      The patient will pursue further outpatient evaluation with the primary care physician or other designated or consulting physician as outlined in the discharge instructions. They are agreeable to this plan of care and follow-up instructions have been explained in detail. The patient has received these instructions in written format and has expressed an understanding of the discharge instructions. The patient is aware that any significant change in condition or worsening of symptoms should prompt an immediate return to this or the closest emergency department or call to 911.  I have explained discharge medications and the need for follow up with the patient/caretakers. This was also printed in the discharge instructions. Patient was discharged with the following medications and follow up:      Medication List        New Prescriptions      methocarbamol 500 MG tablet  Commonly known as: ROBAXIN  Take 1 tablet by mouth 3 (Three) Times a Day As Needed for Muscle Spasms.     methylPREDNISolone 4 MG dose pack  Commonly known as: MEDROL  Take as directed on package instructions.               Where to Get Your Medications        These medications were sent to [id number] - [place], [place] - [address] AT [facility] - [phone]  - [phone] FX  [address] [place] [address]      Phone: [phone]   methocarbamol 500 MG tablet  methylPREDNISolone 4 MG dose pack      Mathew Mehta, DO  [address] [facility] [place] [address]  [phone]    Schedule an appointment as soon as  possible for a visit   As needed       Final diagnoses:   Muscle strain   Trapezius muscle spasm        ED Disposition       ED Disposition   Discharge    Condition   Stable    Comment   --               This medical record created using voice recognition software.             Heydi Eastman, MI  03/27/24 0603

## 2024-03-27 NOTE — ED NOTES
Pain 10/10 located at left scapula. Pt has tried tylenol, ibu, biofreeze, lidocaine patch and voltaren cream. Pt reports nothing is relieving pain.

## 2024-03-27 NOTE — DISCHARGE INSTRUCTIONS
Rest.  Alternate ice and moist heat.    Use your Lidoderm patches as you have at home.    Medication as prescribed for pain.    May continue your Tylenol, Motrin or Aleve in addition to medications prescribed

## 2024-05-15 ENCOUNTER — TELEPHONE (OUTPATIENT)
Dept: PODIATRY | Facility: CLINIC | Age: 74
End: 2024-05-15

## 2024-05-15 NOTE — TELEPHONE ENCOUNTER
Provider: IFTIKHAR    Caller: SHAKIRA SALVADOR    Relationship to Patient: PATIENT    Pharmacy: NA    Phone Number: [phone]    Reason for Call: PATIENT HAS PAINFUL SECOND TOE THAT IS VERY SWOLLEN WITH INCREASING REDNESS AND WOULD LIKE TO BE SEEN AS SOON AS POSSIBLE; [facility]UB HAS SCHEDULED FIRST AVAILABLE, WHICH IS JUNE 17    When was the patient last seen: 2020

## 2024-05-20 ENCOUNTER — TELEPHONE (OUTPATIENT)
Dept: FAMILY MEDICINE CLINIC | Facility: CLINIC | Age: 74
End: 2024-05-20
Payer: MEDICARE

## 2024-05-20 DIAGNOSIS — M79.674 TOE PAIN, RIGHT: Primary | ICD-10-CM

## 2024-05-20 NOTE — TELEPHONE ENCOUNTER
Patient is needing a referral to [facility] for inflamed second toe on right foot. To

## 2024-05-31 ENCOUNTER — OFFICE VISIT (OUTPATIENT)
Dept: ORTHOPEDIC SURGERY | Facility: CLINIC | Age: 74
End: 2024-05-31
Payer: MEDICARE

## 2024-05-31 VITALS
WEIGHT: 149.2 LBS | HEART RATE: 96 BPM | DIASTOLIC BLOOD PRESSURE: 74 MMHG | BODY MASS INDEX: 25.61 KG/M2 | OXYGEN SATURATION: 97 % | SYSTOLIC BLOOD PRESSURE: 132 MMHG

## 2024-05-31 DIAGNOSIS — M18.0 ARTHRITIS OF CARPOMETACARPAL (CMC) JOINTS OF BOTH THUMBS: Primary | ICD-10-CM

## 2024-05-31 RX ADMIN — TRIAMCINOLONE ACETONIDE 40 MG: 40 INJECTION, SUSPENSION INTRA-ARTICULAR; INTRAMUSCULAR at 16:00

## 2024-05-31 RX ADMIN — LIDOCAINE HYDROCHLORIDE 1 ML: 10 INJECTION, SOLUTION INFILTRATION; PERINEURAL at 16:00

## 2024-05-31 NOTE — PROGRESS NOTES
Chief Complaint  Follow-up of the Right Hand and Follow-up of the Left Hand     Subjective      Audrey Emerson presents to [facility] for follow up of bilateral hands. She is here for bilateral hand steroid injections. She has pain in bilateral thumbs.  She cannot undo a jar lid and has difficulty picking up a coffee pot.  She is here today for bilateral CMC joint arthritis. Her last injections were 11/29/23    Allergies   Allergen Reactions    Pentazocine Hallucinations        Social History     Socioeconomic History    Marital status:    Tobacco Use    Smoking status: Never     Passive exposure: Past    Smokeless tobacco: Never   Vaping Use    Vaping status: Never Used   Substance and Sexual Activity    Alcohol use: No    Drug use: Never    Sexual activity: Yes     Partners: Male     Birth control/protection: Post-menopausal, Bilateral salpingectomy , Hysterectomy, Same-sex partner     Comment: on estradiol        I reviewed the patient's chief complaint, history of present illness, review of systems, past medical history, surgical history, family history, social history, medications, and allergy list.     Review of Systems     Constitutional: Denies fevers, chills, weight loss  Cardiovascular: Denies chest pain, shortness of breath  Skin: Denies rashes, acute skin changes  Neurologic: Denies headache, loss of consciousness        Vital Signs:   /74   Pulse 96   Wt 67.7 kg (149 lb 3.2 oz)   SpO2 97%   BMI 25.61 kg/m²          Physical Exam  General: Alert. No acute distress    Ortho Exam          BILATERAL HANDS Negative Compression testing/ Negative Tinels. NegativeFinkelsteins. Negative House's testing. Positive CMC grind testing. Negative Phalens. Full ROM of the hand, fingers, elbow and wrist. Negative Triggering of the digit. Sensation grossly intact to light touch, median, radial and ulnar nerve. Positive AIN, PIN and ulnar nerve motor function intact. Axillary  nerve intact. Positive pulses.           Small Joint Arthrocentesis: R thumb CMC  Consent given by: patient  Site marked: site marked  Timeout: Immediately prior to procedure a time out was called to verify the correct patient, procedure, equipment, support staff and site/side marked as required   Supporting Documentation  Indications: pain   Procedure Details  Location: thumb - R thumb CMC  Preparation: Patient was prepped and draped in the usual sterile fashion  Needle gauge: 23G.  Medications administered: 1 mL lidocaine 1 %; 40 mg triamcinolone acetonide 40 MG/ML  Patient tolerance: patient tolerated the procedure well with no immediate complications      Small Joint Arthrocentesis: L thumb CMC  Consent given by: patient  Site marked: site marked  Timeout: Immediately prior to procedure a time out was called to verify the correct patient, procedure, equipment, support staff and site/side marked as required   Supporting Documentation  Indications: pain   Procedure Details  Location: thumb - L thumb CMC  Preparation: Patient was prepped and draped in the usual sterile fashion  Needle gauge: 23G.  Medications administered: 1 mL lidocaine 1 %; 40 mg triamcinolone acetonide 40 MG/ML  Patient tolerance: patient tolerated the procedure well with no immediate complications        This injection documentation was Scribed for Nabeel Perez MD by Melissa Pereyra MA.  05/31/24   16:38 EDT        Imaging Results (Most Recent)       None             Result Review :             Assessment and Plan     Diagnoses and all orders for this visit:    1. Arthritis of carpometacarpal (CMC) joints of both thumbs (Primary)    Other orders  -     Small Joint Arthrocentesis: R thumb CMC  -     Small Joint Arthrocentesis: L thumb CMC        Discussed the treatment plan with the patient.     Discussed the risks and benefits of conservative measures The patient expressed understanding and wished to proceed with bilateral hand CMC joint  thumb injections.  She tolerated the injections well.       Call or return if worsening symptoms.    Follow Up     PRN      Patient was given instructions and counseling regarding her condition or for health maintenance advice. Please see specific information pulled into the AVS if appropriate.     Scribed for Nabeel Perez MD by Chula Pickett MA.  05/31/24   16:28 EDT      I have personally performed the services described in this document as scribed by the above individual and it is both accurate and complete. Nabeel Perez MD 06/03/24

## 2024-06-03 RX ORDER — LIDOCAINE HYDROCHLORIDE 10 MG/ML
1 INJECTION, SOLUTION INFILTRATION; PERINEURAL
Status: COMPLETED | OUTPATIENT
Start: 2024-05-31 | End: 2024-05-31

## 2024-06-03 RX ORDER — TRIAMCINOLONE ACETONIDE 40 MG/ML
40 INJECTION, SUSPENSION INTRA-ARTICULAR; INTRAMUSCULAR
Status: COMPLETED | OUTPATIENT
Start: 2024-05-31 | End: 2024-05-31

## 2024-06-19 RX ORDER — ROPINIROLE 1 MG/1
2 TABLET, FILM COATED ORAL
Qty: 90 TABLET | Refills: 1 | Status: SHIPPED | OUTPATIENT
Start: 2024-06-19 | End: 2024-12-16

## 2024-09-18 RX ORDER — ATENOLOL 25 MG/1
25 TABLET ORAL DAILY
Qty: 90 TABLET | Refills: 3 | Status: SHIPPED | OUTPATIENT
Start: 2024-09-18

## 2024-09-18 RX ORDER — ROPINIROLE 1 MG/1
2 TABLET, FILM COATED ORAL
Qty: 90 TABLET | Refills: 1 | Status: SHIPPED | OUTPATIENT
Start: 2024-09-18

## 2024-09-23 ENCOUNTER — HOSPITAL ENCOUNTER (OUTPATIENT)
Dept: GENERAL RADIOLOGY | Facility: HOSPITAL | Age: 74
Discharge: HOME | End: 2024-09-23
Admitting: NURSE PRACTITIONER
Payer: MEDICARE

## 2024-09-23 ENCOUNTER — OFFICE VISIT (OUTPATIENT)
Dept: FAMILY MEDICINE CLINIC | Facility: CLINIC | Age: 74
End: 2024-09-23
Payer: MEDICARE

## 2024-09-23 VITALS
OXYGEN SATURATION: 97 % | TEMPERATURE: 99.9 F | SYSTOLIC BLOOD PRESSURE: 120 MMHG | WEIGHT: 140.2 LBS | DIASTOLIC BLOOD PRESSURE: 81 MMHG | HEART RATE: 74 BPM | BODY MASS INDEX: 24.07 KG/M2

## 2024-09-23 DIAGNOSIS — R10.9 FLANK PAIN: Primary | ICD-10-CM

## 2024-09-23 DIAGNOSIS — R31.9 HEMATURIA, UNSPECIFIED TYPE: ICD-10-CM

## 2024-09-23 DIAGNOSIS — H61.21 IMPACTED CERUMEN OF RIGHT EAR: ICD-10-CM

## 2024-09-23 DIAGNOSIS — Z79.899 HIGH RISK MEDICATION USE: ICD-10-CM

## 2024-09-23 LAB
AMPHET+METHAMPHET UR QL: NEGATIVE
AMPHETAMINE INTERNAL CONTROL: NORMAL
AMPHETAMINES UR QL: NEGATIVE
BARBITURATE INTERNAL CONTROL: NORMAL
BARBITURATES UR QL SCN: NEGATIVE
BENZODIAZ UR QL SCN: NEGATIVE
BENZODIAZEPINE INTERNAL CONTROL: NORMAL
BILIRUB BLD-MCNC: NEGATIVE MG/DL
BUPRENORPHINE INTERNAL CONTROL: NORMAL
BUPRENORPHINE SERPL-MCNC: NEGATIVE NG/ML
CANNABINOIDS SERPL QL: NEGATIVE
CLARITY, POC: CLEAR
COCAINE INTERNAL CONTROL: NORMAL
COCAINE UR QL: NEGATIVE
COLOR UR: YELLOW
EXPIRATION DATE: ABNORMAL
EXPIRATION DATE: NORMAL
GLUCOSE UR STRIP-MCNC: NEGATIVE MG/DL
KETONES UR QL: ABNORMAL
LEUKOCYTE EST, POC: ABNORMAL
Lab: ABNORMAL
Lab: NORMAL
MDMA (ECSTASY) INTERNAL CONTROL: NORMAL
MDMA UR QL SCN: NEGATIVE
METHADONE INTERNAL CONTROL: NORMAL
METHADONE UR QL SCN: NEGATIVE
METHAMPHETAMINE INTERNAL CONTROL: NORMAL
MORPHINE INTERNAL CONTROL: NORMAL
MORPHINE/OPIATES SCREEN, URINE: NEGATIVE
NITRITE UR-MCNC: NEGATIVE MG/ML
OXYCODONE INTERNAL CONTROL: NORMAL
OXYCODONE UR QL SCN: NEGATIVE
PCP UR QL SCN: NEGATIVE
PH UR: 5.5 [PH] (ref 5–8)
PHENCYCLIDINE INTERNAL CONTROL: NORMAL
PROT UR STRIP-MCNC: ABNORMAL MG/DL
RBC # UR STRIP: ABNORMAL /UL
SP GR UR: 1.01 (ref 1–1.03)
THC INTERNAL CONTROL: NORMAL
UROBILINOGEN UR QL: NORMAL

## 2024-09-23 PROCEDURE — 81003 URINALYSIS AUTO W/O SCOPE: CPT | Performed by: NURSE PRACTITIONER

## 2024-09-23 PROCEDURE — 1159F MED LIST DOCD IN RCRD: CPT | Performed by: NURSE PRACTITIONER

## 2024-09-23 PROCEDURE — 74018 RADEX ABDOMEN 1 VIEW: CPT

## 2024-09-23 PROCEDURE — 87086 URINE CULTURE/COLONY COUNT: CPT | Performed by: NURSE PRACTITIONER

## 2024-09-23 PROCEDURE — 96372 THER/PROPH/DIAG INJ SC/IM: CPT | Performed by: NURSE PRACTITIONER

## 2024-09-23 PROCEDURE — 87088 URINE BACTERIA CULTURE: CPT | Performed by: NURSE PRACTITIONER

## 2024-09-23 PROCEDURE — 87186 SC STD MICRODIL/AGAR DIL: CPT | Performed by: NURSE PRACTITIONER

## 2024-09-23 PROCEDURE — 69209 REMOVE IMPACTED EAR WAX UNI: CPT | Performed by: NURSE PRACTITIONER

## 2024-09-23 PROCEDURE — 1160F RVW MEDS BY RX/DR IN RCRD: CPT | Performed by: NURSE PRACTITIONER

## 2024-09-23 PROCEDURE — 99214 OFFICE O/P EST MOD 30 MIN: CPT | Performed by: NURSE PRACTITIONER

## 2024-09-23 RX ORDER — KETOROLAC TROMETHAMINE 30 MG/ML
60 INJECTION, SOLUTION INTRAMUSCULAR; INTRAVENOUS ONCE
Status: COMPLETED | OUTPATIENT
Start: 2024-09-23 | End: 2024-09-23

## 2024-09-23 RX ADMIN — KETOROLAC TROMETHAMINE 60 MG: 30 INJECTION, SOLUTION INTRAMUSCULAR; INTRAVENOUS at 14:14

## 2024-09-24 ENCOUNTER — HOSPITAL ENCOUNTER (OUTPATIENT)
Dept: CT IMAGING | Facility: HOSPITAL | Age: 74
Discharge: HOME | End: 2024-09-24
Admitting: NURSE PRACTITIONER
Payer: MEDICARE

## 2024-09-24 DIAGNOSIS — R10.9 FLANK PAIN: Primary | ICD-10-CM

## 2024-09-24 DIAGNOSIS — R31.9 HEMATURIA, UNSPECIFIED TYPE: ICD-10-CM

## 2024-09-24 DIAGNOSIS — N28.9 RENAL LESION: ICD-10-CM

## 2024-09-24 DIAGNOSIS — R10.9 FLANK PAIN: ICD-10-CM

## 2024-09-24 PROCEDURE — 74176 CT ABD & PELVIS W/O CONTRAST: CPT

## 2024-09-24 RX ORDER — NITROFURANTOIN 25; 75 MG/1; MG/1
100 CAPSULE ORAL 2 TIMES DAILY
Qty: 14 CAPSULE | Refills: 0 | Status: SHIPPED | OUTPATIENT
Start: 2024-09-24

## 2024-09-25 LAB — BACTERIA SPEC AEROBE CULT: ABNORMAL

## 2024-09-30 ENCOUNTER — TELEPHONE (OUTPATIENT)
Dept: FAMILY MEDICINE CLINIC | Facility: CLINIC | Age: 74
End: 2024-09-30

## 2024-09-30 DIAGNOSIS — M81.0 AGE-RELATED OSTEOPOROSIS WITHOUT CURRENT PATHOLOGICAL FRACTURE: Primary | ICD-10-CM

## 2024-09-30 DIAGNOSIS — Z00.00 ENCOUNTER FOR ANNUAL PHYSICAL EXAM: ICD-10-CM

## 2024-09-30 NOTE — TELEPHONE ENCOUNTER
Caller: Audrey Emerson    Relationship to patient: Self    Best call back number: [phone]     Patient is needing: PATIENT STATES SHE WANTS HER LAB ORDERS PUT IN BEFORE HER APPT ON 10.3.24 AND CALL BACK WHEN THEY ARE IN.

## 2024-10-01 ENCOUNTER — LAB (OUTPATIENT)
Dept: LAB | Facility: HOSPITAL | Age: 74
End: 2024-10-01
Payer: MEDICARE

## 2024-10-01 LAB
25(OH)D3 SERPL-MCNC: 79.8 NG/ML (ref 30–100)
ALBUMIN SERPL-MCNC: 4.1 G/DL (ref 3.5–5.2)
ALBUMIN/GLOB SERPL: 1.3 G/DL
ALP SERPL-CCNC: 94 U/L (ref 39–117)
ALT SERPL W P-5'-P-CCNC: 25 U/L (ref 1–33)
ANION GAP SERPL CALCULATED.3IONS-SCNC: 11.7 MMOL/L (ref 5–15)
AST SERPL-CCNC: 25 U/L (ref 1–32)
BASOPHILS # BLD AUTO: 0.03 10*3/MM3 (ref 0–0.2)
BASOPHILS NFR BLD AUTO: 0.5 % (ref 0–1.5)
BILIRUB SERPL-MCNC: 0.3 MG/DL (ref 0–1.2)
BUN SERPL-MCNC: 18 MG/DL (ref 8–23)
BUN/CREAT SERPL: 16.1 (ref 7–25)
CALCIUM SPEC-SCNC: 9.8 MG/DL (ref 8.6–10.5)
CHLORIDE SERPL-SCNC: 100 MMOL/L (ref 98–107)
CHOLEST SERPL-MCNC: 170 MG/DL (ref 0–200)
CO2 SERPL-SCNC: 25.3 MMOL/L (ref 22–29)
CREAT SERPL-MCNC: 1.12 MG/DL (ref 0.57–1)
DEPRECATED RDW RBC AUTO: 41.9 FL (ref 37–54)
EGFRCR SERPLBLD CKD-EPI 2021: 52 ML/MIN/1.73
EOSINOPHIL # BLD AUTO: 0.1 10*3/MM3 (ref 0–0.4)
EOSINOPHIL NFR BLD AUTO: 1.6 % (ref 0.3–6.2)
ERYTHROCYTE [DISTWIDTH] IN BLOOD BY AUTOMATED COUNT: 11.8 % (ref 12.3–15.4)
GLOBULIN UR ELPH-MCNC: 3.1 GM/DL
GLUCOSE SERPL-MCNC: 84 MG/DL (ref 65–99)
HCT VFR BLD AUTO: 45.4 % (ref 34–46.6)
HDLC SERPL-MCNC: 61 MG/DL (ref 40–60)
HGB BLD-MCNC: 15.4 G/DL (ref 12–15.9)
IMM GRANULOCYTES # BLD AUTO: 0.03 10*3/MM3 (ref 0–0.05)
IMM GRANULOCYTES NFR BLD AUTO: 0.5 % (ref 0–0.5)
LDLC SERPL CALC-MCNC: 92 MG/DL (ref 0–100)
LDLC/HDLC SERPL: 1.49 {RATIO}
LYMPHOCYTES # BLD AUTO: 1.66 10*3/MM3 (ref 0.7–3.1)
LYMPHOCYTES NFR BLD AUTO: 26.3 % (ref 19.6–45.3)
MCH RBC QN AUTO: 32.9 PG (ref 26.6–33)
MCHC RBC AUTO-ENTMCNC: 33.9 G/DL (ref 31.5–35.7)
MCV RBC AUTO: 97 FL (ref 79–97)
MONOCYTES # BLD AUTO: 0.52 10*3/MM3 (ref 0.1–0.9)
MONOCYTES NFR BLD AUTO: 8.3 % (ref 5–12)
NEUTROPHILS NFR BLD AUTO: 3.96 10*3/MM3 (ref 1.7–7)
NEUTROPHILS NFR BLD AUTO: 62.8 % (ref 42.7–76)
NRBC BLD AUTO-RTO: 0 /100 WBC (ref 0–0.2)
PLATELET # BLD AUTO: 212 10*3/MM3 (ref 140–450)
PMV BLD AUTO: 12.6 FL (ref 6–12)
POTASSIUM SERPL-SCNC: 4.5 MMOL/L (ref 3.5–5.2)
PROT SERPL-MCNC: 7.2 G/DL (ref 6–8.5)
RBC # BLD AUTO: 4.68 10*6/MM3 (ref 3.77–5.28)
SODIUM SERPL-SCNC: 137 MMOL/L (ref 136–145)
TRIGL SERPL-MCNC: 92 MG/DL (ref 0–150)
TSH SERPL DL<=0.05 MIU/L-ACNC: 2.2 UIU/ML (ref 0.27–4.2)
VLDLC SERPL-MCNC: 17 MG/DL (ref 5–40)
WBC NRBC COR # BLD AUTO: 6.3 10*3/MM3 (ref 3.4–10.8)

## 2024-10-01 PROCEDURE — 80061 LIPID PANEL: CPT | Performed by: FAMILY MEDICINE

## 2024-10-01 PROCEDURE — 85025 COMPLETE CBC W/AUTO DIFF WBC: CPT | Performed by: FAMILY MEDICINE

## 2024-10-01 PROCEDURE — 82306 VITAMIN D 25 HYDROXY: CPT | Performed by: FAMILY MEDICINE

## 2024-10-01 PROCEDURE — 80053 COMPREHEN METABOLIC PANEL: CPT | Performed by: FAMILY MEDICINE

## 2024-10-01 PROCEDURE — 84443 ASSAY THYROID STIM HORMONE: CPT | Performed by: FAMILY MEDICINE

## 2024-10-03 ENCOUNTER — OFFICE VISIT (OUTPATIENT)
Dept: FAMILY MEDICINE CLINIC | Facility: CLINIC | Age: 74
End: 2024-10-03
Payer: MEDICARE

## 2024-10-03 ENCOUNTER — HOSPITAL ENCOUNTER (OUTPATIENT)
Dept: MRI IMAGING | Facility: HOSPITAL | Age: 74
Discharge: HOME | End: 2024-10-03
Admitting: NURSE PRACTITIONER
Payer: MEDICARE

## 2024-10-03 VITALS
OXYGEN SATURATION: 96 % | BODY MASS INDEX: 23.39 KG/M2 | TEMPERATURE: 97.9 F | WEIGHT: 137 LBS | HEIGHT: 64 IN | SYSTOLIC BLOOD PRESSURE: 124 MMHG | HEART RATE: 60 BPM | DIASTOLIC BLOOD PRESSURE: 76 MMHG

## 2024-10-03 DIAGNOSIS — Z12.31 ENCOUNTER FOR SCREENING MAMMOGRAM FOR MALIGNANT NEOPLASM OF BREAST: ICD-10-CM

## 2024-10-03 DIAGNOSIS — M81.0 AGE-RELATED OSTEOPOROSIS WITHOUT CURRENT PATHOLOGICAL FRACTURE: ICD-10-CM

## 2024-10-03 DIAGNOSIS — N28.9 RENAL LESION: ICD-10-CM

## 2024-10-03 DIAGNOSIS — R31.9 HEMATURIA, UNSPECIFIED TYPE: ICD-10-CM

## 2024-10-03 DIAGNOSIS — R10.9 FLANK PAIN: ICD-10-CM

## 2024-10-03 DIAGNOSIS — Z00.00 MEDICARE ANNUAL WELLNESS VISIT, SUBSEQUENT: Primary | ICD-10-CM

## 2024-10-03 PROCEDURE — A9577 INJ MULTIHANCE: HCPCS | Performed by: NURSE PRACTITIONER

## 2024-10-03 PROCEDURE — 74183 MRI ABD W/O CNTR FLWD CNTR: CPT

## 2024-10-03 PROCEDURE — 0 GADOBENATE DIMEGLUMINE 529 MG/ML SOLUTION: Performed by: NURSE PRACTITIONER

## 2024-10-03 RX ORDER — ATENOLOL 25 MG/1
25 TABLET ORAL DAILY
Qty: 90 TABLET | Refills: 3 | Status: SHIPPED | OUTPATIENT
Start: 2024-10-03

## 2024-10-03 RX ORDER — PROMETHAZINE HYDROCHLORIDE 25 MG/1
25 TABLET ORAL EVERY 6 HOURS PRN
Qty: 15 TABLET | Refills: 1 | Status: SHIPPED | OUTPATIENT
Start: 2024-10-03

## 2024-10-03 RX ORDER — ROPINIROLE 1 MG/1
2 TABLET, FILM COATED ORAL
Qty: 90 TABLET | Refills: 3 | Status: SHIPPED | OUTPATIENT
Start: 2024-10-03

## 2024-10-03 RX ORDER — ESTRADIOL 1 MG/1
1 TABLET ORAL DAILY
Qty: 90 TABLET | Refills: 3 | Status: SHIPPED | OUTPATIENT
Start: 2024-10-03

## 2024-10-03 RX ADMIN — GADOBENATE DIMEGLUMINE 12 ML: 529 INJECTION, SOLUTION INTRAVENOUS at 08:37

## 2024-10-03 NOTE — PROGRESS NOTES
Subjective   The ABCs of the Annual Wellness Visit  Medicare Wellness Visit      Audrey Emerson is a 74 y.o. patient who presents for a Medicare Wellness Visit.    The following portions of the patient's history were reviewed and   updated as appropriate: allergies, current medications, past family history, past medical history, past social history, past surgical history, and problem list.    Compared to one year ago, the patient's physical   health is the same.  Compared to one year ago, the patient's mental   health is the same.    Recent Hospitalizations:  She was not admitted to the hospital during the last year.     Current Medical Providers:  Patient Care Team:  Mathew Mehta DO as PCP - General (Family Medicine)    Outpatient Medications Prior to Visit   Medication Sig Dispense Refill    aspirin 81 MG EC tablet aspirin 81 mg oral tablet,delayed release (DR/EC) take 1 tablet (81 mg) by oral route once daily   Active      cholecalciferol (VITAMIN D3) 25 MCG (1000 UT) tablet Vitamin D3 1,000 unit (25 mcg) oral tablet take 1 tablet by oral route daily   Active      folic acid (FOLVITE) 1 MG tablet folic acid oral take 1  by oral route daily   Active      Prenatal Vit-Fe Fumarate-FA (PRENATAL, CLASSIC, VITAMIN) 28-0.8 MG tablet tablet Take 1 tablet by mouth Daily.      atenolol (TENORMIN) 25 MG tablet TAKE 1 TABLET BY MOUTH DAILY 90 tablet 3    estradiol (ESTRACE) 1 MG tablet Take 1 tablet by mouth Daily. 90 tablet 3    promethazine (PHENERGAN) 25 MG tablet Take 1 tablet by mouth Every 6 (Six) Hours As Needed for Nausea or Vomiting. 15 tablet 1    rOPINIRole (REQUIP) 1 MG tablet TAKE 2 TABLETS BY MOUTH EVERY NIGHT AT BEDTIME 90 tablet 1    Multiple Vitamins-Minerals (MULTIVITAMIN ADULT EXTRA C PO) multivitamin Oral tablet take 1 tablet by oral route daily   Suspended (Patient not taking: Reported on 10/3/2024)      naproxen sodium (ALEVE) 220 MG tablet Aleve 220 mg oral tablet take 1 tablet by oral  "route daily   Active (Patient not taking: Reported on 10/3/2024)      nitrofurantoin, macrocrystal-monohydrate, (Macrobid) 100 MG capsule Take 1 capsule by mouth 2 (Two) Times a Day. (Patient not taking: Reported on 10/3/2024) 14 capsule 0    vitamin B-12 (CYANOCOBALAMIN) 1000 MCG tablet Take 1 tablet by mouth Daily. (Patient not taking: Reported on 9/23/2024)      vitamin C (ASCORBIC ACID) 250 MG tablet Take 4 tablets by mouth Daily. (Patient not taking: Reported on 9/23/2024)      Zinc 50 MG capsule Take 50 tablets by mouth Daily. (Patient not taking: Reported on 9/23/2024)       No facility-administered medications prior to visit.     No opioid medication identified on active medication list. I have reviewed chart for other potential  high risk medication/s and harmful drug interactions in the elderly.      Aspirin is on active medication list. Aspirin use is not indicated based on review of current medical condition/s. Risk of harm outweighs potential benefits. Patient instructed to discontinue this medication.  .      Patient Active Problem List   Diagnosis    Precordial chest pain    JAIDA on CPAP    Jhonny-Danlos disease    Osteoporosis    Restless leg    Arthritis    Encounter for annual physical exam    Encounter for screening mammogram for malignant neoplasm of breast    Acute non-recurrent maxillary sinusitis    Medicare annual wellness visit, subsequent    Corns and callus    Gastrointestinal ulcer    Pain of foot    Seasonal allergic rhinitis    Encounter for screening for malignant neoplasm of colon     Advance Care Planning Advance Directive is on file.  ACP discussion was held with the patient during this visit. Patient has an advance directive in EMR which is still valid.             Objective   Vitals:    10/03/24 1211   BP: 124/76   BP Location: Left arm   Patient Position: Sitting   Pulse: 60   Temp: 97.9 °F (36.6 °C)   SpO2: 96%   Weight: 62.1 kg (137 lb)   Height: 162.6 cm (64\")       Estimated " "body mass index is 23.52 kg/m² as calculated from the following:    Height as of this encounter: 162.6 cm (64\").    Weight as of this encounter: 62.1 kg (137 lb).    BMI is within normal parameters. No other follow-up for BMI required.       Does the patient have evidence of cognitive impairment? No  Lab Results   Component Value Date    TRIG 92 10/01/2024    HDL 61 (H) 10/01/2024    LDL 92 10/01/2024    VLDL 17 10/01/2024                                                                                                Health  Risk Assessment    Smoking Status:  Social History     Tobacco Use   Smoking Status Never    Passive exposure: Past   Smokeless Tobacco Never     Alcohol Consumption:  Social History     Substance and Sexual Activity   Alcohol Use No       Fall Risk Screen  STEADI Fall Risk Assessment was completed, and patient is at LOW risk for falls.Assessment completed on:10/3/2024    Depression Screening:      10/3/2024    12:14 PM   PHQ-2/PHQ-9 Depression Screening   Little Interest or Pleasure in Doing Things 0-->not at all   Feeling Down, Depressed or Hopeless 0-->not at all   PHQ-9: Brief Depression Severity Measure Score 0     Health Habits and Functional and Cognitive Screening:      10/3/2024    12:04 PM   Functional & Cognitive Status   Do you have difficulty preparing food and eating? No   Do you have difficulty bathing yourself, getting dressed or grooming yourself? No   Do you have difficulty using the toilet? No   Do you have difficulty moving around from place to place? No   Do you have trouble with steps or getting out of a bed or a chair? No   Current Diet Well Balanced Diet   Dental Exam Up to date   Eye Exam Up to date   Exercise (times per week) 4 times per week   Current Exercises Include Walking   Do you need help using the phone?  No   Are you deaf or do you have serious difficulty hearing?  No   Do you need help to go to places out of walking distance? No   Do you need help shopping? No "   Do you need help preparing meals?  No   Do you need help with housework?  No   Do you need help with laundry? No   Do you need help taking your medications? No   Do you need help managing money? No   Do you ever drive or ride in a car without wearing a seat belt? No   Have you felt unusual stress, anger or loneliness in the last month? No   Who do you live with? Spouse   If you need help, do you have trouble finding someone available to you? No   Have you been bothered in the last four weeks by sexual problems? No   Do you have difficulty concentrating, remembering or making decisions? No           Age-appropriate Screening Schedule:  Refer to the list below for future screening recommendations based on patient's age, sex and/or medical conditions. Orders for these recommended tests are listed in the plan section. The patient has been provided with a written plan.    Health Maintenance List  Health Maintenance   Topic Date Due    TDAP/TD VACCINES (1 - Tdap) Never done    Pneumococcal Vaccine 65+ (1 of 1 - PCV) Never done    HEPATITIS C SCREENING  Never done    INFLUENZA VACCINE  08/01/2024    DXA SCAN  12/20/2024    ZOSTER VACCINE (1 of 2) 10/03/2024 (Originally 9/20/2001)    COVID-19 Vaccine (1 - 2023-24 season) 12/23/2024 (Originally 9/1/2024)    ANNUAL WELLNESS VISIT  10/03/2025    MAMMOGRAM  12/22/2025    COLORECTAL CANCER SCREENING  12/06/2033                                                                                                                                                CMS Preventative Services Quick Reference  Risk Factors Identified During Encounter  Immunizations Discussed/Encouraged: Tdap, Influenza, and Prevnar 20 (Pneumococcal 20-valent conjugate)    The above risks/problems have been discussed with the patient.  Pertinent information has been shared with the patient in the After Visit Summary.  An After Visit Summary and PPPS were made available to the patient.    Follow Up:   Next  "Medicare Wellness visit to be scheduled in 1 year.          Additional E&M Note during same encounter follows:  Patient has multiple medical problems which are significant and separately identifiable that require additional work above and beyond the Medicare Wellness Visit.      Chief Complaint  Medicare Wellness-subsequent    Audrey Emerson is a 74 y.o. female who presents to [facility]     Review of Systems   Constitutional:  Negative for fatigue.   HENT:  Negative for congestion, postnasal drip and rhinorrhea.    Eyes:  Negative for visual disturbance.   Respiratory:  Negative for cough, chest tightness, shortness of breath and wheezing.    Cardiovascular:  Negative for chest pain and palpitations.   Gastrointestinal:  Negative for constipation and diarrhea.   Neurological:  Positive for headaches.   Psychiatric/Behavioral:  The patient is not nervous/anxious.      Objective   Vital Signs:   Vitals:    10/03/24 1211   BP: 124/76   BP Location: Left arm   Patient Position: Sitting   Pulse: 60   Temp: 97.9 °F (36.6 °C)   SpO2: 96%   Weight: 62.1 kg (137 lb)   Height: 162.6 cm (64\")       Wt Readings from Last 3 Encounters:   10/03/24 62.1 kg (137 lb)   09/23/24 63.6 kg (140 lb 3.2 oz)   05/31/24 67.7 kg (149 lb 3.2 oz)     BP Readings from Last 3 Encounters:   10/03/24 124/76   09/23/24 120/81   05/31/24 132/74       Physical Exam  Vitals and nursing note reviewed.   Constitutional:       General: She is not in acute distress.     Appearance: Normal appearance. She is normal weight.   HENT:      Head: Normocephalic.      Right Ear: Tympanic membrane, ear canal and external ear normal.      Left Ear: Tympanic membrane, ear canal and external ear normal.      Nose: Nose normal.      Mouth/Throat:      Mouth: Mucous membranes are moist.      Pharynx: Oropharynx is clear.   Eyes:      General: No scleral icterus.     Conjunctiva/sclera: Conjunctivae normal.      Pupils: Pupils are " equal, round, and reactive to light.   Cardiovascular:      Rate and Rhythm: Normal rate and regular rhythm.      Pulses: Normal pulses.      Heart sounds: Normal heart sounds. No murmur heard.  Pulmonary:      Effort: Pulmonary effort is normal.      Breath sounds: Normal breath sounds. No wheezing, rhonchi or rales.   Musculoskeletal:      Cervical back: Neck supple. No rigidity or tenderness.   Lymphadenopathy:      Cervical: No cervical adenopathy.   Skin:     General: Skin is warm and dry.      Coloration: Skin is not jaundiced.      Findings: No rash.   Neurological:      General: No focal deficit present.      Mental Status: She is alert and oriented to person, place, and time.   Psychiatric:         Mood and Affect: Mood normal.         Thought Content: Thought content normal.         Judgment: Judgment normal.         The following data was reviewed by Mathew Mehta DO on 10/03/2024  CMP   CMP          10/1/2024    10:03   CMP   Glucose 84    BUN 18    Creatinine 1.12    EGFR 52.0    Sodium 137    Potassium 4.5    Chloride 100    Calcium 9.8    Total Protein 7.2    Albumin 4.1    Globulin 3.1    Total Bilirubin 0.3    Alkaline Phosphatase 94    AST (SGOT) 25    ALT (SGPT) 25    Albumin/Globulin Ratio 1.3    BUN/Creatinine Ratio 16.1    Anion Gap 11.7      CBC   CBC          10/1/2024    10:03   CBC   WBC 6.30    RBC 4.68    Hemoglobin 15.4    Hematocrit 45.4    MCV 97.0    MCH 32.9    MCHC 33.9    RDW 11.8    Platelets 212      LIPID   Lipid Panel          10/1/2024    10:03   Lipid Panel   Total Cholesterol 170    Triglycerides 92    HDL Cholesterol 61    VLDL Cholesterol 17    LDL Cholesterol  92    LDL/HDL Ratio 1.49      TSH   TSH          10/1/2024    10:03   TSH   TSH 2.200          Assessment & Plan   Diagnoses and all orders for this visit:    1. Medicare annual wellness visit, subsequent (Primary)  Assessment & Plan:  Overall she really is doing well.  She recently thought she had a  kidney infection but did not.  Her scans did show a complex cyst on her left kidney.  She had an MRI this morning and those results are still pending.  Her care gaps show that does she need some immunizations but in reviewing her immunization log she does appear to be up to date and her immunization registry needs to be updated.      2. Encounter for screening mammogram for malignant neoplasm of breast  -     Mammo Screening Digital Tomosynthesis Bilateral With CAD; Future    3. Age-related osteoporosis without current pathological fracture  -     DEXA Bone Density Axial; Future    Other orders  -     atenolol (TENORMIN) 25 MG tablet; Take 1 tablet by mouth Daily.  Dispense: 90 tablet; Refill: 3  -     estradiol (ESTRACE) 1 MG tablet; Take 1 tablet by mouth Daily.  Dispense: 90 tablet; Refill: 3  -     rOPINIRole (REQUIP) 1 MG tablet; Take 2 tablets by mouth every night at bedtime.  Dispense: 90 tablet; Refill: 3  -     promethazine (PHENERGAN) 25 MG tablet; Take 1 tablet by mouth Every 6 (Six) Hours As Needed for Nausea or Vomiting.  Dispense: 15 tablet; Refill: 1  -     Fluzone High-Dose 65+yrs (4628-2782)          BMI is within normal parameters. No other follow-up for BMI required.       FOLLOW UP  Return in about 1 year (around 10/3/2025) for Annual physical, Medicare Wellness.  Patient was given instructions and counseling regarding her condition or for health maintenance advice. Please see specific information pulled into the AVS if appropriate.     Mathew Mehta,   10/03/24  12:45 EDT

## 2024-10-03 NOTE — ASSESSMENT & PLAN NOTE
Overall she really is doing well.  She recently thought she had a kidney infection but did not.  Her scans did show a complex cyst on her left kidney.  She had an MRI this morning and those results are still pending.  Her care gaps show that does she need some immunizations but in reviewing her immunization log she does appear to be up to date and her immunization registry needs to be updated.

## 2024-10-04 DIAGNOSIS — C64.2 RENAL CELL CARCINOMA OF LEFT KIDNEY: Primary | ICD-10-CM

## 2024-10-07 ENCOUNTER — TELEPHONE (OUTPATIENT)
Dept: FAMILY MEDICINE CLINIC | Facility: CLINIC | Age: 74
End: 2024-10-07

## 2024-10-07 NOTE — TELEPHONE ENCOUNTER
Caller: Audrey Emerson    Relationship: Self    Best call back number: [phone]      What specialty or service is being requested: UROLOGY       Any additional details: PATIENT WAS ADVISED TO CALL BACK IF SHE HAD NOT HEARD FROM THE UROLOGIST OFFICE BY AROUND NOON AND SHE HAS NOT PLEASE CONTACT AND ADVISE

## 2024-10-07 NOTE — TELEPHONE ENCOUNTER
Notified pt that referral states they are waiting review for Dr. Sanders. Pt verbalized understanding via phone

## 2024-10-09 ENCOUNTER — OFFICE VISIT (OUTPATIENT)
Dept: UROLOGY | Facility: CLINIC | Age: 74
End: 2024-10-09
Payer: MEDICARE

## 2024-10-09 ENCOUNTER — PREP FOR SURGERY (OUTPATIENT)
Dept: OTHER | Facility: HOSPITAL | Age: 74
End: 2024-10-09
Payer: MEDICARE

## 2024-10-09 VITALS
HEIGHT: 64 IN | WEIGHT: 139.8 LBS | SYSTOLIC BLOOD PRESSURE: 139 MMHG | DIASTOLIC BLOOD PRESSURE: 91 MMHG | BODY MASS INDEX: 23.87 KG/M2

## 2024-10-09 DIAGNOSIS — C64.2 RENAL CELL CARCINOMA OF LEFT KIDNEY: Primary | ICD-10-CM

## 2024-10-09 PROBLEM — N28.89 LEFT RENAL MASS: Status: ACTIVE | Noted: 2024-10-09

## 2024-10-09 LAB
BILIRUB BLD-MCNC: NEGATIVE MG/DL
CLARITY, POC: CLEAR
COLOR UR: YELLOW
EXPIRATION DATE: NORMAL
GLUCOSE UR STRIP-MCNC: NEGATIVE MG/DL
KETONES UR QL: NEGATIVE
LEUKOCYTE EST, POC: NEGATIVE
Lab: NORMAL
NITRITE UR-MCNC: NEGATIVE MG/ML
PH UR: 5.5 [PH] (ref 5–8)
PROT UR STRIP-MCNC: NEGATIVE MG/DL
RBC # UR STRIP: NEGATIVE /UL
SP GR UR: 1.01 (ref 1–1.03)
UROBILINOGEN UR QL: NORMAL

## 2024-10-09 RX ORDER — SODIUM CHLORIDE 0.9 % (FLUSH) 0.9 %
10 SYRINGE (ML) INJECTION EVERY 12 HOURS SCHEDULED
OUTPATIENT
Start: 2024-10-09

## 2024-10-09 RX ORDER — RED BEET 500 MG
1 CAPSULE ORAL DAILY
COMMUNITY

## 2024-10-09 RX ORDER — VIT C/B6/B5/MAGNESIUM/HERB 173 50-5-6-5MG
1 CAPSULE ORAL DAILY
COMMUNITY

## 2024-10-09 RX ORDER — SODIUM CHLORIDE 0.9 % (FLUSH) 0.9 %
3 SYRINGE (ML) INJECTION EVERY 12 HOURS SCHEDULED
OUTPATIENT
Start: 2024-10-09

## 2024-10-09 RX ORDER — SODIUM CHLORIDE 0.9 % (FLUSH) 0.9 %
10 SYRINGE (ML) INJECTION AS NEEDED
OUTPATIENT
Start: 2024-10-09

## 2024-10-09 NOTE — PROGRESS NOTES
"Chief Complaint  Establish Care (Renal mass)    Subjective          Audrey Emerson presents to [facility] UROLOG[facility]  History of Present Illness  Patient is having some vague left flank discomfort as well as just generally not feeling well.  She also had some UTI symptoms.  Urine was abnormal.  CT scan was performed.  She was found to have a partially calcified exophytic left upper pole renal mass.  MRI was done to further characterize and it is enhancing concerning for renal cell carcinoma.  She has no other abnormalities of the left kidney.  1 small cyst on the right kidney.  No lymphadenopathy.  She does no history of kidney cancer.      Objective   Vital Signs:   /91   Ht 162.6 cm (64\")   Wt 63.4 kg (139 lb 12.8 oz)   BMI 24.00 kg/m²       Physical Exam  Vitals and nursing note reviewed.   Constitutional:       Appearance: Normal appearance. She is well-developed.   Pulmonary:      Effort: Pulmonary effort is normal.      Breath sounds: Normal air entry.   Neurological:      Mental Status: She is alert and oriented to person, place, and time.      Motor: Motor function is intact.   Psychiatric:         Mood and Affect: Mood normal.         Behavior: Behavior normal.          Result Review :   The following data was reviewed by: Sandra Sanders MD on 10/09/2024:    Results for orders placed or performed in visit on 10/09/24   POC Urinalysis Dipstick, Automated    Specimen: Urine   Result Value Ref Range    Color Yellow Yellow, Straw, Dark Yellow, Melvina    Clarity, UA Clear Clear    Specific Gravity  1.015 1.005 - 1.030    pH, Urine 5.5 5.0 - 8.0    Leukocytes Negative Negative    Nitrite, UA Negative Negative    Protein, POC Negative Negative mg/dL    Glucose, UA Negative Negative mg/dL    Ketones, UA Negative Negative    Urobilinogen, UA 0.2 E.U./dL Normal, 0.2 E.U./dL    Bilirubin Negative Negative    Blood, UA Negative Negative    Lot Number [id number]     Expiration Date 08/31/25  "            Study Result    Narrative & Impression   CT ABDOMEN PELVIS WO CONTRAST     Date of Exam: 9/24/2024 11:43 AM EDT     Indication: Abdominal pain, flank pain.     Comparison: KUB dated 9/23/2024     Technique: Axial CT images were obtained of the abdomen and pelvis without the administration of contrast. Reconstructed coronal and sagittal images were also obtained. Automated exposure control and iterative construction methods were used.        Findings:  The heart size is normal. There is no pericardial effusion. The lung bases demonstrate bandlike atelectasis within the lingula.     The liver is normal in size and contour. The gallbladder is surgically absent. There is dilation of the common bile duct measuring up to 10 mm which can be seen with physiologic changes after cholecystectomy in the absence of abnormal liver function   tests or elevated bilirubin.     The spleen is normal in size. The adrenal glands are unremarkable. There is normal appearance of the pancreas by noncontrast technique.     There is a partially exophytic 3.0 cm lesion arising from the anterior upper pole of the left kidney. There are areas of both linear and coarse dystrophic calcification. This is indeterminate on noncontrast only imaging. There are no renal or ureteral   stones. Urinary bladder is collapsed. There is circumferential bladder wall thickening and surrounding perivesicular stranding. The uterus is surgically absent. There are no adnexal masses.     The stomach and duodenum are normal in caliber and configuration. There are no abnormally dilated loops of small bowel to suggest small bowel obstruction or small bowel inflammation. The appendix is visualized and normal within the pelvis. There is   colonic diverticulosis without acute diverticulitis. There is no free fluid or free air.     The aorta is normal in caliber without aneurysm formation. There is no mesenteric, retroperitoneal, or pelvic lymphadenopathy. There  are degenerative changes of the lumbar spine at L4-L5.     IMPRESSION:  Impression:     1. Circumferential bladder wall thickening and perivesicular stranding surrounding the distended urinary bladder. Findings could reflect cystitis. Correlate clinically with urinalysis.  2. No evidence of renal or ureteral stones.  3. Abnormal 3 cm lesion arising from the anterior upper pole of the left kidney. There are areas of both linear and dystrophic calcification present within the lesion. This could represent a complex cyst or partially calcified mass. Given the presence of   calcification, MRI abdomen without and with IV contrast renal mass protocol would be the best test for further evaluation. A CT renal mass protocol would be less optimal if patient has contraindication for MRI.     Electronically Signed: Donavan Howardelor    9/24/2024 12:10 PM EDT    Workstation ID: [id number]       Study Result    Narrative & Impression   MRI ABDOMEN W WO CONTRAST     Date of Exam: 10/3/2024 7:55 AM EDT     Indication: kidney lesion, flank pain.     Comparison: None available.     Technique:  Routine multiplanar/multisequence images of the abdomen were obtained before and after the uneventful administration of Multihance.        Findings:  Liver and spleen are normal in size. There is no significant hepatic steatosis. Exam was tailored to evaluate the kidneys. Previous cholecystectomy. No liver lesion is seen. The common bile duct is mildly dilated measuring about 10 mm not unusual in a   postcholecystectomy patient. No definite choledocholithiasis seen. Spleen is normal. There is no adrenal finding evident. No suspicious pancreatic findings. Right renal cyst measuring about 0.6 cm.     In the upper pole of the left kidney, there is a heterogeneous mass measuring 2.9 x 2.3 x 3.1 cm. The mass is T1 heterogeneous and T2 heterogeneous. It is slightly hypointense on T2 imaging. It contains calcifications as seen on recent CT scan. The mass    demonstrates significant enhancement most concerning for neoplasm. The renal vein appears patent. There is no retroperitoneal adenopathy.     IMPRESSION:  Impression:  3.1 cm enhancing mass in the upper pole of the left kidney most concerning for renal cell carcinoma.                       Electronically Signed: Francois Clay MD    10/4/2024 3:10 PM EDT    Workstation ID: [id number]            Assessment and Plan    Diagnoses and all orders for this visit:    1. Renal cell carcinoma of left kidney (Primary)  -     POC Urinalysis Dipstick, Automated    She will have a left robotic partial nephrectomy.  Risk and benefits discussed with the patient she is agreeable to proceed.  She understands there is a slight chance we will need to remove the entire left kidney and she is agreeable to that.        Follow Up       No follow-ups on file.  Patient was given instructions and counseling regarding her condition or for health maintenance advice. Please see specific information pulled into the AVS if appropriate.

## 2024-10-09 NOTE — PROGRESS NOTES
Instructed to hold aspirin for 5 days per Dr. Sanders prior to procedure. Patient verbalized understanding.

## 2024-10-09 NOTE — H&P (VIEW-ONLY)
[facility]   Urology HISTORY AND PHYSICAL    Patient Name: Audrey Emerson  : 1950  MRN: [id number]  Primary Care Physician:  Mathew Mehta DO  Date of admission: (Not on file)    Subjective   Subjective       History of Present Illness  Patient has a left upper pole anterior 3 cm renal mass and presents for left robotic partial nephrectomy      Personal History     Past Medical History:   Diagnosis Date    Acromioclavicular separation     Ankle sprain     Arthritis     Arthritis of back Teenager    Erhlers Danlos    Arthritis of neck     Bursitis of hip     Cervical disc disorder     Cholelithiasis surgery     Corns and callus     Dislocated elbow     Dislocation of finger     Dislocation, shoulder     Jhonny-Danlos disease     Foot pain, bilateral 2019    Fracture, finger     Fracture, foot     Frozen shoulder +    Gastrointestinal ulcer     Hip arthrosis +    Knee sprain     Knee swelling +    Low back strain     Lumbosacral disc disease     Mitral valve disorder     mitral valve prolapse    Neck strain     Osteoporosis     Periarthritis of shoulder     Restless leg     Rotator cuff syndrome -    Seasonal allergies     Stress fracture     Tendinitis of knee     Tennis elbow     Ulcer 1970    Uterine fibroid     Wrist sprain        Past Surgical History:   Procedure Laterality Date    ANKLE SURGERY      BREAST BIOPSY Bilateral 1984    BREAST LUMPECTOMY       SECTION      CHOLECYSTECTOMY      COLONOSCOPY  2003    COLONOSCOPY N/A 2023    Procedure: COLONOSCOPY;  Surgeon: Raul Laughlin MD;  Location: [facility] ENDOSCOPY;  Service: Gastroenterology;  Laterality: N/A;  DIVERTICULOSIS    GALLBLADDER SURGERY      HYSTERECTOMY      MYOMECTOMY      ROTATOR CUFF REPAIR      SALPINGO OOPHORECTOMY      bso    SHOULDER SURGERY   1989    Repair    TRIGGER POINT INJECTION         Family History: family history includes  Alcohol abuse in her brother; Anesthesia problems in her sister; Arthritis in her brother, daughter, mother, and sister; Breast cancer in her mother; Broken bones in her brother; Cancer in her brother, father, mother, and sister; Clotting disorder in her mother; Collagen disease in her mother; Colon polyps in her father; Dislocations in her mother; Heart disease in her brother and father; Liver cancer in her brother; Osteoporosis in her mother; Scoliosis in her mother; Severe sprains in her mother; Stroke in her mother. Otherwise pertinent FHx was reviewed and not pertinent to current issue.    Social History:  reports that she has never smoked. She has been exposed to tobacco smoke. She has never used smokeless tobacco. She reports that she does not drink alcohol and does not use drugs.    Home Medications:  Beet Root, Magnesium Citrate, Turmeric, aspirin, atenolol, cholecalciferol, estradiol, folic acid, prenatal vitamin, promethazine, and rOPINIRole    Allergies:  Allergies   Allergen Reactions    Pentazocine Hallucinations       Objective    Objective     Vitals:   BP: (139)/(91) 139/91    Physical Exam  Constitutional:       Appearance: Normal appearance.   Cardiovascular:      Rate and Rhythm: Normal rate and regular rhythm.   Pulmonary:      Effort: Pulmonary effort is normal.      Breath sounds: Normal breath sounds.   Neurological:      Mental Status: She is alert. Mental status is at baseline.   Psychiatric:         Mood and Affect: Mood and affect normal.         Speech: Speech normal.         Judgment: Judgment normal.         Result Review    Result Review:  I have personally reviewed the results from the time of this admission to 10/9/2024 10:30 EDT and agree with these findings:  [x]  Laboratory  []  Microbiology  [x]  Radiology  []  EKG/Telemetry   []  Cardiology/Vascular   []  Pathology  [x]  Old records  []  Other:      Assessment & Plan   Assessment / Plan       Active Hospital Problems:  There  are no active hospital problems to display for this patient.      Plan:  left robotic partial nephrectomy  Risks and benefits discussed with patient and they are agreeable to proceed.    VTE Prophylaxis:  No VTE prophylaxis order currently exists.        CODE STATUS:           Electronically signed by Sandra Sanders MD, 10/09/24, 10:30 AM EDT.

## 2024-10-09 NOTE — H&P
[facility]   Urology HISTORY AND PHYSICAL    Patient Name: Audrey Emerson  : 1950  MRN: [id number]  Primary Care Physician:  Mathew Mehta DO  Date of admission: (Not on file)    Subjective   Subjective       History of Present Illness  Patient has a left upper pole anterior 3 cm renal mass and presents for left robotic partial nephrectomy      Personal History     Past Medical History:   Diagnosis Date    Acromioclavicular separation     Ankle sprain     Arthritis     Arthritis of back Teenager    Erhlers Danlos    Arthritis of neck     Bursitis of hip     Cervical disc disorder     Cholelithiasis surgery     Corns and callus     Dislocated elbow     Dislocation of finger     Dislocation, shoulder     Jhonny-Danlos disease     Foot pain, bilateral 2019    Fracture, finger     Fracture, foot     Frozen shoulder +    Gastrointestinal ulcer     Hip arthrosis +    Knee sprain     Knee swelling +    Low back strain     Lumbosacral disc disease     Mitral valve disorder     mitral valve prolapse    Neck strain     Osteoporosis     Periarthritis of shoulder     Restless leg     Rotator cuff syndrome -    Seasonal allergies     Stress fracture     Tendinitis of knee     Tennis elbow     Ulcer 1970    Uterine fibroid     Wrist sprain        Past Surgical History:   Procedure Laterality Date    ANKLE SURGERY      BREAST BIOPSY Bilateral 1984    BREAST LUMPECTOMY       SECTION      CHOLECYSTECTOMY      COLONOSCOPY  2003    COLONOSCOPY N/A 2023    Procedure: COLONOSCOPY;  Surgeon: Raul Laughlin MD;  Location: [facility] ENDOSCOPY;  Service: Gastroenterology;  Laterality: N/A;  DIVERTICULOSIS    GALLBLADDER SURGERY      HYSTERECTOMY      MYOMECTOMY      ROTATOR CUFF REPAIR      SALPINGO OOPHORECTOMY      bso    SHOULDER SURGERY   1989    Repair    TRIGGER POINT INJECTION         Family History: family history includes  Alcohol abuse in her brother; Anesthesia problems in her sister; Arthritis in her brother, daughter, mother, and sister; Breast cancer in her mother; Broken bones in her brother; Cancer in her brother, father, mother, and sister; Clotting disorder in her mother; Collagen disease in her mother; Colon polyps in her father; Dislocations in her mother; Heart disease in her brother and father; Liver cancer in her brother; Osteoporosis in her mother; Scoliosis in her mother; Severe sprains in her mother; Stroke in her mother. Otherwise pertinent FHx was reviewed and not pertinent to current issue.    Social History:  reports that she has never smoked. She has been exposed to tobacco smoke. She has never used smokeless tobacco. She reports that she does not drink alcohol and does not use drugs.    Home Medications:  Beet Root, Magnesium Citrate, Turmeric, aspirin, atenolol, cholecalciferol, estradiol, folic acid, prenatal vitamin, promethazine, and rOPINIRole    Allergies:  Allergies   Allergen Reactions    Pentazocine Hallucinations       Objective    Objective     Vitals:   BP: (139)/(91) 139/91    Physical Exam  Constitutional:       Appearance: Normal appearance.   Cardiovascular:      Rate and Rhythm: Normal rate and regular rhythm.   Pulmonary:      Effort: Pulmonary effort is normal.      Breath sounds: Normal breath sounds.   Neurological:      Mental Status: She is alert. Mental status is at baseline.   Psychiatric:         Mood and Affect: Mood and affect normal.         Speech: Speech normal.         Judgment: Judgment normal.         Result Review    Result Review:  I have personally reviewed the results from the time of this admission to 10/9/2024 10:30 EDT and agree with these findings:  [x]  Laboratory  []  Microbiology  [x]  Radiology  []  EKG/Telemetry   []  Cardiology/Vascular   []  Pathology  [x]  Old records  []  Other:      Assessment & Plan   Assessment / Plan       Active Hospital Problems:  There  are no active hospital problems to display for this patient.      Plan:  left robotic partial nephrectomy  Risks and benefits discussed with patient and they are agreeable to proceed.    VTE Prophylaxis:  No VTE prophylaxis order currently exists.        CODE STATUS:           Electronically signed by Sandra Sanders MD, 10/09/24, 10:30 AM EDT.

## 2024-10-10 ENCOUNTER — TELEPHONE (OUTPATIENT)
Dept: UROLOGY | Facility: CLINIC | Age: 74
End: 2024-10-10
Payer: MEDICARE

## 2024-10-10 NOTE — TELEPHONE ENCOUNTER
Patient had questions concerning upcoming procedure. I answered them and patient voiced understanding.

## 2024-10-15 ENCOUNTER — PRE-ADMISSION TESTING (OUTPATIENT)
Dept: PREADMISSION TESTING | Facility: HOSPITAL | Age: 74
End: 2024-10-15
Payer: MEDICARE

## 2024-10-15 ENCOUNTER — HOSPITAL ENCOUNTER (OUTPATIENT)
Dept: GENERAL RADIOLOGY | Facility: HOSPITAL | Age: 74
Discharge: HOME | End: 2024-10-15
Payer: MEDICARE

## 2024-10-15 VITALS
BODY MASS INDEX: 24.69 KG/M2 | DIASTOLIC BLOOD PRESSURE: 64 MMHG | RESPIRATION RATE: 18 BRPM | OXYGEN SATURATION: 99 % | WEIGHT: 144.62 LBS | HEART RATE: 57 BPM | TEMPERATURE: 98.2 F | SYSTOLIC BLOOD PRESSURE: 122 MMHG | HEIGHT: 64 IN

## 2024-10-15 DIAGNOSIS — C64.2 RENAL CELL CARCINOMA OF LEFT KIDNEY: ICD-10-CM

## 2024-10-15 DIAGNOSIS — Z01.818 PRE-OP TESTING: Primary | ICD-10-CM

## 2024-10-15 LAB
ABO GROUP BLD: NORMAL
QT INTERVAL: 397 MS
QTC INTERVAL: 386 MS
RH BLD: NEGATIVE

## 2024-10-15 PROCEDURE — 36415 COLL VENOUS BLD VENIPUNCTURE: CPT

## 2024-10-15 PROCEDURE — 71046 X-RAY EXAM CHEST 2 VIEWS: CPT

## 2024-10-15 PROCEDURE — 86900 BLOOD TYPING SEROLOGIC ABO: CPT

## 2024-10-15 PROCEDURE — 86901 BLOOD TYPING SEROLOGIC RH(D): CPT

## 2024-10-15 PROCEDURE — 93005 ELECTROCARDIOGRAM TRACING: CPT

## 2024-10-15 NOTE — DISCHARGE INSTRUCTIONS
IMPORTANT INSTRUCTIONS - PRE-ADMISSION TESTING  DO NOT EAT OR CHEW anything after midnight the night before your procedure.    You may have CLEAR liquids up to 2 hours prior to ARRIVAL time.   Take the following medications the morning of your procedure with JUST A SIP OF WATER:  NONE    DO NOT BRING your medications to the hospital with you, UNLESS something has changed since your PRE-Admission Testing appointment.  Hold all vitamins, supplements, and NSAIDS (Non- steroidal anti-inflammatory meds) for one week prior to surgery (you MAY take Tylenol or Acetaminophen).  Bring your CPAP or BIPAP to hospital, ONLY IF YOU WILL BE SPENDING THE NIGHT.   Make sure you have a ride home and have someone who will stay with you the day of your procedure after you go home.  If you have any questions, please call your Pre-Admission Testing Nurse, Alondra at [phone].   You will receive a call the day before surgery with an arrival time.  COME TO ELEVATOR A, THIRD FLOOR, CHECK IN AT THE DESK FOR REGISTRATION/SURGERY     Clear Liquid Diet        Find out when you need to start a clear liquid diet.   Think of “clear liquids” as anything you could read a newspaper through. This includes things like water, broth, sports drinks, or tea WITHOUT any kind of milk or cream.           Once you are told to start a clear liquid diet, only drink these things until 2 hours before arrival to the hospital or when the hospital says to stop. Total volume limitation: 8 oz.       Clear liquids you CAN drink:   Water   Clear broth: beef, chicken, vegetable, or bone broth with nothing in it   Gatorade   Lemonade or Billy-aid   Soda   Tea, coffee (NO cream or honey)   Jell-O (without fruit)   Popsicles (without fruit or cream)   Italian ices   Juice without pulp: apple, white, grape   You may use salt, pepper, and sugar  NOTHING RED    Do NOT drink:   Milk or cream   Soy milk, almond milk, coconut milk, or other non-dairy drinks and   creamers    Milkshakes or smoothies   Tomato juice   Orange juice   Grapefruit juice   Cream soups or any other than broth         Clear Liquid Diet:  Do NOT eat any solid food.  Do NOT eat or suck on mints or candy.  Do NOT chew gum.  Do NOT drink thick liquids like milk or juice with pulp in it.  Do NOT add milk, cream, or anything like soy milk or almond milk to coffee or tea.       Use surgical soap night before or morning of surgery.   Do not use on hair, face or private areas  After bathing no lotions, deodorant, makeup or fingernail polish.

## 2024-10-16 ENCOUNTER — ANESTHESIA EVENT (OUTPATIENT)
Dept: PERIOP | Facility: HOSPITAL | Age: 74
End: 2024-10-16
Payer: MEDICARE

## 2024-10-23 ENCOUNTER — OFFICE VISIT (OUTPATIENT)
Dept: ORTHOPEDIC SURGERY | Facility: CLINIC | Age: 74
End: 2024-10-23
Payer: MEDICARE

## 2024-10-23 VITALS
WEIGHT: 137 LBS | DIASTOLIC BLOOD PRESSURE: 82 MMHG | BODY MASS INDEX: 23.39 KG/M2 | HEART RATE: 52 BPM | SYSTOLIC BLOOD PRESSURE: 144 MMHG | OXYGEN SATURATION: 97 % | HEIGHT: 64 IN

## 2024-10-23 DIAGNOSIS — M18.0 ARTHRITIS OF CARPOMETACARPAL (CMC) JOINTS OF BOTH THUMBS: Primary | ICD-10-CM

## 2024-10-23 RX ORDER — LIDOCAINE HYDROCHLORIDE 10 MG/ML
1 INJECTION, SOLUTION INFILTRATION; PERINEURAL
Status: COMPLETED | OUTPATIENT
Start: 2024-10-23 | End: 2024-10-23

## 2024-10-23 RX ORDER — TRIAMCINOLONE ACETONIDE 40 MG/ML
40 INJECTION, SUSPENSION INTRA-ARTICULAR; INTRAMUSCULAR
Status: COMPLETED | OUTPATIENT
Start: 2024-10-23 | End: 2024-10-23

## 2024-10-23 RX ADMIN — TRIAMCINOLONE ACETONIDE 40 MG: 40 INJECTION, SUSPENSION INTRA-ARTICULAR; INTRAMUSCULAR at 09:15

## 2024-10-23 RX ADMIN — LIDOCAINE HYDROCHLORIDE 1 ML: 10 INJECTION, SOLUTION INFILTRATION; PERINEURAL at 09:15

## 2024-10-23 NOTE — PROGRESS NOTES
"Chief Complaint  Follow-up of the Right Hand and Follow-up of the Left Hand     Subjective      Audrey Emerson presents to [facility] for follow up of bilateral hands.  She is here for bilateral hand steroid injections. She has pain in bilateral thumbs.  She cannot undo a jar lid and has difficulty picking up a coffee pot.  She is here today for bilateral CMC joint arthritis. Her last injections were 5/31/24.  She has had increase pain for the last few weeks and is having increase difficulty with  and FMC tasks.     Allergies   Allergen Reactions    Pentazocine Hallucinations        Social History     Socioeconomic History    Marital status:    Tobacco Use    Smoking status: Never     Passive exposure: Past    Smokeless tobacco: Never   Vaping Use    Vaping status: Never Used   Substance and Sexual Activity    Alcohol use: No    Drug use: Never    Sexual activity: Defer     Comment: on estradiol        I reviewed the patient's chief complaint, history of present illness, review of systems, past medical history, surgical history, family history, social history, medications, and allergy list.     Review of Systems     Constitutional: Denies fevers, chills, weight loss  Cardiovascular: Denies chest pain, shortness of breath  Skin: Denies rashes, acute skin changes  Neurologic: Denies headache, loss of consciousness        Vital Signs:   /82   Pulse 52   Ht 162.6 cm (64\")   Wt 62.1 kg (137 lb)   SpO2 97%   BMI 23.52 kg/m²          Physical Exam  General: Alert. No acute distress    Ortho Exam        BILATERAL HANDS Negative Compression testing/ Negative Tinels. NegativeFinkelsteins. Negative House's testing. Positive CMC grind testing. Negative Phalens. Full ROM of the hand, fingers, elbow and wrist. Negative Triggering of the digit. Sensation grossly intact to light touch, median, radial and ulnar nerve. Positive AIN, PIN and ulnar nerve motor function intact. " Axillary nerve intact. Positive pulses.           Small Joint Arthrocentesis: R thumb CMC  Consent given by: patient  Site marked: site marked  Timeout: Immediately prior to procedure a time out was called to verify the correct patient, procedure, equipment, support staff and site/side marked as required   Supporting Documentation  Indications: pain   Procedure Details  Location: thumb - R thumb CMC  Preparation: Patient was prepped and draped in the usual sterile fashion  Needle gauge: 23G.  Medications administered: 1 mL lidocaine 1 %; 40 mg triamcinolone acetonide 40 MG/ML  Patient tolerance: patient tolerated the procedure well with no immediate complications      Small Joint Arthrocentesis: L thumb CMC  Consent given by: patient  Site marked: site marked  Timeout: Immediately prior to procedure a time out was called to verify the correct patient, procedure, equipment, support staff and site/side marked as required   Supporting Documentation  Indications: pain   Procedure Details  Location: thumb - L thumb CMC  Preparation: Patient was prepped and draped in the usual sterile fashion  Needle gauge: 23G.  Medications administered: 1 mL lidocaine 1 %; 40 mg triamcinolone acetonide 40 MG/ML  Patient tolerance: patient tolerated the procedure well with no immediate complications      This injection documentation was Scribed for Nabeel Perez MD by Reji Martinez.  10/23/24   09:07 EDT      Imaging Results (Most Recent)       None             Result Review :             Assessment and Plan     Diagnoses and all orders for this visit:    1. Arthritis of carpometacarpal (CMC) joints of both thumbs (Primary)        Discussed the treatment plan with the patient.     Discussed the risks and benefits of conservative measures. The patient expressed understanding and wished to proceed with bilateral thumb CMC injections.   She tolerated the injections well.       Call or return if worsening symptoms.    Follow Up      PRN      Patient was given instructions and counseling regarding her condition or for health maintenance advice. Please see specific information pulled into the AVS if appropriate.     Scribed for Nabeel Perez MD by Chula Pickett MA.  10/23/24   08:54 EDT      I have personally performed the services described in this document as scribed by the above individual and it is both accurate and complete. Nabeel Perez MD 10/23/24

## 2024-10-29 ENCOUNTER — ANESTHESIA (OUTPATIENT)
Dept: PERIOP | Facility: HOSPITAL | Age: 74
End: 2024-10-29
Payer: MEDICARE

## 2024-10-29 ENCOUNTER — ANESTHESIA EVENT CONVERTED (OUTPATIENT)
Dept: ANESTHESIOLOGY | Facility: HOSPITAL | Age: 74
End: 2024-10-29
Payer: MEDICARE

## 2024-10-29 ENCOUNTER — HOSPITAL ENCOUNTER (OUTPATIENT)
Facility: HOSPITAL | Age: 74
Discharge: HOME | End: 2024-10-30
Attending: UROLOGY | Admitting: UROLOGY
Payer: MEDICARE

## 2024-10-29 DIAGNOSIS — N28.89 LEFT RENAL MASS: Primary | ICD-10-CM

## 2024-10-29 DIAGNOSIS — C64.2 RENAL CELL CARCINOMA OF LEFT KIDNEY: ICD-10-CM

## 2024-10-29 PROBLEM — C64.9 RENAL CELL CARCINOMA: Status: ACTIVE | Noted: 2024-10-29

## 2024-10-29 LAB
ABO GROUP BLD: NORMAL
BLD GP AB SCN SERPL QL: NEGATIVE
RH BLD: NEGATIVE
T&S EXPIRATION DATE: NORMAL

## 2024-10-29 PROCEDURE — 25010000002 MAGNESIUM SULFATE PER 500 MG OF MAGNESIUM: Performed by: NURSE ANESTHETIST, CERTIFIED REGISTERED

## 2024-10-29 PROCEDURE — 25010000002 HYDRALAZINE PER 20 MG: Performed by: NURSE ANESTHETIST, CERTIFIED REGISTERED

## 2024-10-29 PROCEDURE — 50543 LAPARO PARTIAL NEPHRECTOMY: CPT | Performed by: SPECIALIST/TECHNOLOGIST, OTHER

## 2024-10-29 PROCEDURE — A9270 NON-COVERED ITEM OR SERVICE: HCPCS | Performed by: UROLOGY

## 2024-10-29 PROCEDURE — P9041 ALBUMIN (HUMAN),5%, 50ML: HCPCS | Performed by: NURSE ANESTHETIST, CERTIFIED REGISTERED

## 2024-10-29 PROCEDURE — 25010000002 MEPERIDINE PER 100 MG: Performed by: ANESTHESIOLOGY

## 2024-10-29 PROCEDURE — 25810000003 LACTATED RINGERS PER 1000 ML: Performed by: ANESTHESIOLOGY

## 2024-10-29 PROCEDURE — 25010000002 HYDROMORPHONE 1 MG/ML SOLUTION: Performed by: UROLOGY

## 2024-10-29 PROCEDURE — A9270 NON-COVERED ITEM OR SERVICE: HCPCS | Performed by: ANESTHESIOLOGY

## 2024-10-29 PROCEDURE — 25010000002 LIDOCAINE PF 2% 2 % SOLUTION: Performed by: NURSE ANESTHETIST, CERTIFIED REGISTERED

## 2024-10-29 PROCEDURE — 25010000002 CEFAZOLIN PER 500 MG: Performed by: UROLOGY

## 2024-10-29 PROCEDURE — 63710000001 ESTRADIOL 1 MG TABLET: Performed by: UROLOGY

## 2024-10-29 PROCEDURE — 94799 UNLISTED PULMONARY SVC/PX: CPT

## 2024-10-29 PROCEDURE — 63710000001 SCOPOLAMINE 1 MG/3DAYS PATCH 72 HOUR: Performed by: ANESTHESIOLOGY

## 2024-10-29 PROCEDURE — 25010000002 HYDROMORPHONE 1 MG/ML SOLUTION: Performed by: NURSE ANESTHETIST, CERTIFIED REGISTERED

## 2024-10-29 PROCEDURE — 25010000002 ONDANSETRON PER 1 MG: Performed by: NURSE ANESTHETIST, CERTIFIED REGISTERED

## 2024-10-29 PROCEDURE — 88342 IMHCHEM/IMCYTCHM 1ST ANTB: CPT | Performed by: UROLOGY

## 2024-10-29 PROCEDURE — 25010000002 MIDAZOLAM PER 1MG: Performed by: ANESTHESIOLOGY

## 2024-10-29 PROCEDURE — 86900 BLOOD TYPING SEROLOGIC ABO: CPT | Performed by: UROLOGY

## 2024-10-29 PROCEDURE — 86901 BLOOD TYPING SEROLOGIC RH(D): CPT | Performed by: UROLOGY

## 2024-10-29 PROCEDURE — 63710000001 PROMETHAZINE PER 25 MG: Performed by: UROLOGY

## 2024-10-29 PROCEDURE — 63710000001 ROPINIROLE 1 MG TABLET: Performed by: UROLOGY

## 2024-10-29 PROCEDURE — 63710000001 OXYCODONE-ACETAMINOPHEN 5-325 MG TABLET: Performed by: UROLOGY

## 2024-10-29 PROCEDURE — 88307 TISSUE EXAM BY PATHOLOGIST: CPT | Performed by: UROLOGY

## 2024-10-29 PROCEDURE — 0 LABETALOL 5 MG/ML SOLUTION: Performed by: NURSE ANESTHETIST, CERTIFIED REGISTERED

## 2024-10-29 PROCEDURE — 25810000003 SODIUM CHLORIDE 0.9 % SOLUTION: Performed by: NURSE ANESTHETIST, CERTIFIED REGISTERED

## 2024-10-29 PROCEDURE — 63710000001 ACETAMINOPHEN EXTRA STRENGTH 500 MG TABLET: Performed by: ANESTHESIOLOGY

## 2024-10-29 PROCEDURE — 63710000001 SENNOSIDES-DOCUSATE 8.6-50 MG TABLET: Performed by: UROLOGY

## 2024-10-29 PROCEDURE — 63710000001 ATENOLOL 25 MG TABLET: Performed by: UROLOGY

## 2024-10-29 PROCEDURE — 88341 IMHCHEM/IMCYTCHM EA ADD ANTB: CPT | Performed by: UROLOGY

## 2024-10-29 PROCEDURE — 25010000002 ONDANSETRON PER 1 MG: Performed by: UROLOGY

## 2024-10-29 PROCEDURE — 25010000002 SUGAMMADEX 200 MG/2ML SOLUTION: Performed by: NURSE ANESTHETIST, CERTIFIED REGISTERED

## 2024-10-29 PROCEDURE — 25010000002 DEXAMETHASONE PER 1 MG: Performed by: NURSE ANESTHETIST, CERTIFIED REGISTERED

## 2024-10-29 PROCEDURE — 25010000002 ALBUMIN HUMAN 5% PER 50 ML: Performed by: NURSE ANESTHETIST, CERTIFIED REGISTERED

## 2024-10-29 PROCEDURE — G0378 HOSPITAL OBSERVATION PER HR: HCPCS

## 2024-10-29 PROCEDURE — 50543 LAPARO PARTIAL NEPHRECTOMY: CPT | Performed by: UROLOGY

## 2024-10-29 PROCEDURE — 25010000002 FENTANYL CITRATE (PF) 50 MCG/ML SOLUTION: Performed by: NURSE ANESTHETIST, CERTIFIED REGISTERED

## 2024-10-29 PROCEDURE — 86850 RBC ANTIBODY SCREEN: CPT | Performed by: UROLOGY

## 2024-10-29 PROCEDURE — 25010000002 PROPOFOL 10 MG/ML EMULSION: Performed by: NURSE ANESTHETIST, CERTIFIED REGISTERED

## 2024-10-29 DEVICE — FLOSEAL HEMOSTATIC MATRIX, 10ML
Type: IMPLANTABLE DEVICE | Site: ABDOMEN | Status: FUNCTIONAL
Brand: FLOSEAL HEMOSTATIC MATRIX

## 2024-10-29 DEVICE — CLIP LIG HEMOLOK PA LG 6CT PRP: Type: IMPLANTABLE DEVICE | Site: ABDOMEN | Status: FUNCTIONAL

## 2024-10-29 DEVICE — ABSORBABLE HEMOSTAT (OXIDIZED REGENERATED CELLULOSE, U.S.P.)
Type: IMPLANTABLE DEVICE | Site: ABDOMEN | Status: FUNCTIONAL
Brand: SURGICEL

## 2024-10-29 DEVICE — FLOSEAL WITH RECOTHROM - 10ML.
Type: IMPLANTABLE DEVICE | Site: ABDOMEN | Status: FUNCTIONAL
Brand: FLOSEAL HEMOSTATIC MATRIX

## 2024-10-29 DEVICE — ABSORBABLE WOUND CLOSURE DEVICE
Type: IMPLANTABLE DEVICE | Site: ABDOMEN | Status: FUNCTIONAL
Brand: V-LOC 90

## 2024-10-29 RX ORDER — MAGNESIUM SULFATE HEPTAHYDRATE 500 MG/ML
INJECTION, SOLUTION INTRAMUSCULAR; INTRAVENOUS AS NEEDED
Status: DISCONTINUED | OUTPATIENT
Start: 2024-10-29 | End: 2024-10-29 | Stop reason: SURG

## 2024-10-29 RX ORDER — SODIUM CHLORIDE, SODIUM LACTATE, POTASSIUM CHLORIDE, CALCIUM CHLORIDE 600; 310; 30; 20 MG/100ML; MG/100ML; MG/100ML; MG/100ML
100 INJECTION, SOLUTION INTRAVENOUS CONTINUOUS
Status: ACTIVE | OUTPATIENT
Start: 2024-10-29 | End: 2024-10-29

## 2024-10-29 RX ORDER — LABETALOL HYDROCHLORIDE 5 MG/ML
INJECTION, SOLUTION INTRAVENOUS AS NEEDED
Status: DISCONTINUED | OUTPATIENT
Start: 2024-10-29 | End: 2024-10-29 | Stop reason: SURG

## 2024-10-29 RX ORDER — DEXMEDETOMIDINE HYDROCHLORIDE 100 UG/ML
INJECTION, SOLUTION INTRAVENOUS AS NEEDED
Status: DISCONTINUED | OUTPATIENT
Start: 2024-10-29 | End: 2024-10-29 | Stop reason: SURG

## 2024-10-29 RX ORDER — HYDRALAZINE HYDROCHLORIDE 20 MG/ML
INJECTION INTRAMUSCULAR; INTRAVENOUS AS NEEDED
Status: DISCONTINUED | OUTPATIENT
Start: 2024-10-29 | End: 2024-10-29 | Stop reason: SURG

## 2024-10-29 RX ORDER — BUPIVACAINE HYDROCHLORIDE AND EPINEPHRINE 2.5; 5 MG/ML; UG/ML
INJECTION, SOLUTION EPIDURAL; INFILTRATION; INTRACAUDAL; PERINEURAL
Status: COMPLETED | OUTPATIENT
Start: 2024-10-29 | End: 2024-10-29

## 2024-10-29 RX ORDER — ONDANSETRON 2 MG/ML
4 INJECTION INTRAMUSCULAR; INTRAVENOUS ONCE AS NEEDED
Status: DISCONTINUED | OUTPATIENT
Start: 2024-10-29 | End: 2024-10-29 | Stop reason: HOSPADM

## 2024-10-29 RX ORDER — ACETAMINOPHEN 650 MG/1
650 SUPPOSITORY RECTAL EVERY 4 HOURS PRN
Status: DISCONTINUED | OUTPATIENT
Start: 2024-10-29 | End: 2024-10-30 | Stop reason: HOSPADM

## 2024-10-29 RX ORDER — MEPERIDINE HYDROCHLORIDE 25 MG/ML
25 INJECTION INTRAMUSCULAR; INTRAVENOUS; SUBCUTANEOUS ONCE
Status: COMPLETED | OUTPATIENT
Start: 2024-10-29 | End: 2024-10-29

## 2024-10-29 RX ORDER — ALBUMIN, HUMAN INJ 5% 5 %
SOLUTION INTRAVENOUS CONTINUOUS PRN
Status: DISCONTINUED | OUTPATIENT
Start: 2024-10-29 | End: 2024-10-29 | Stop reason: SURG

## 2024-10-29 RX ORDER — PROMETHAZINE HYDROCHLORIDE 25 MG/1
12.5 TABLET ORAL EVERY 6 HOURS PRN
Status: DISCONTINUED | OUTPATIENT
Start: 2024-10-29 | End: 2024-10-30 | Stop reason: HOSPADM

## 2024-10-29 RX ORDER — PHENYLEPHRINE HCL IN 0.9% NACL 1 MG/10 ML
SYRINGE (ML) INTRAVENOUS AS NEEDED
Status: DISCONTINUED | OUTPATIENT
Start: 2024-10-29 | End: 2024-10-29 | Stop reason: SURG

## 2024-10-29 RX ORDER — ATENOLOL 25 MG/1
25 TABLET ORAL EVERY 24 HOURS
Status: DISCONTINUED | OUTPATIENT
Start: 2024-10-29 | End: 2024-10-30 | Stop reason: HOSPADM

## 2024-10-29 RX ORDER — OXYCODONE HYDROCHLORIDE 5 MG/1
5 TABLET ORAL
Status: DISCONTINUED | OUTPATIENT
Start: 2024-10-29 | End: 2024-10-29 | Stop reason: HOSPADM

## 2024-10-29 RX ORDER — ATENOLOL 25 MG/1
25 TABLET ORAL DAILY
Status: DISCONTINUED | OUTPATIENT
Start: 2024-10-29 | End: 2024-10-29

## 2024-10-29 RX ORDER — ONDANSETRON 2 MG/ML
INJECTION INTRAMUSCULAR; INTRAVENOUS AS NEEDED
Status: DISCONTINUED | OUTPATIENT
Start: 2024-10-29 | End: 2024-10-29 | Stop reason: SURG

## 2024-10-29 RX ORDER — SODIUM CHLORIDE, SODIUM LACTATE, POTASSIUM CHLORIDE, CALCIUM CHLORIDE 600; 310; 30; 20 MG/100ML; MG/100ML; MG/100ML; MG/100ML
9 INJECTION, SOLUTION INTRAVENOUS CONTINUOUS PRN
Status: DISCONTINUED | OUTPATIENT
Start: 2024-10-29 | End: 2024-10-29 | Stop reason: HOSPADM

## 2024-10-29 RX ORDER — FENTANYL CITRATE 50 UG/ML
INJECTION, SOLUTION INTRAMUSCULAR; INTRAVENOUS AS NEEDED
Status: DISCONTINUED | OUTPATIENT
Start: 2024-10-29 | End: 2024-10-29 | Stop reason: SURG

## 2024-10-29 RX ORDER — ONDANSETRON 4 MG/1
4 TABLET, ORALLY DISINTEGRATING ORAL EVERY 6 HOURS PRN
Status: DISCONTINUED | OUTPATIENT
Start: 2024-10-29 | End: 2024-10-30 | Stop reason: HOSPADM

## 2024-10-29 RX ORDER — SODIUM CHLORIDE 0.9 % (FLUSH) 0.9 %
10 SYRINGE (ML) INJECTION EVERY 12 HOURS SCHEDULED
Status: DISCONTINUED | OUTPATIENT
Start: 2024-10-29 | End: 2024-10-29 | Stop reason: HOSPADM

## 2024-10-29 RX ORDER — SODIUM CHLORIDE 9 MG/ML
INJECTION, SOLUTION INTRAVENOUS CONTINUOUS PRN
Status: DISCONTINUED | OUTPATIENT
Start: 2024-10-29 | End: 2024-10-29 | Stop reason: SURG

## 2024-10-29 RX ORDER — PROMETHAZINE HYDROCHLORIDE 25 MG/1
25 SUPPOSITORY RECTAL ONCE AS NEEDED
Status: DISCONTINUED | OUTPATIENT
Start: 2024-10-29 | End: 2024-10-29 | Stop reason: HOSPADM

## 2024-10-29 RX ORDER — SODIUM CHLORIDE 0.9 % (FLUSH) 0.9 %
10 SYRINGE (ML) INJECTION AS NEEDED
Status: DISCONTINUED | OUTPATIENT
Start: 2024-10-29 | End: 2024-10-29 | Stop reason: HOSPADM

## 2024-10-29 RX ORDER — DEXAMETHASONE SODIUM PHOSPHATE 4 MG/ML
INJECTION, SOLUTION INTRA-ARTICULAR; INTRALESIONAL; INTRAMUSCULAR; INTRAVENOUS; SOFT TISSUE AS NEEDED
Status: DISCONTINUED | OUTPATIENT
Start: 2024-10-29 | End: 2024-10-29 | Stop reason: SURG

## 2024-10-29 RX ORDER — ACETAMINOPHEN 500 MG
1000 TABLET ORAL ONCE
Status: COMPLETED | OUTPATIENT
Start: 2024-10-29 | End: 2024-10-29

## 2024-10-29 RX ORDER — OXYCODONE AND ACETAMINOPHEN 5; 325 MG/1; MG/1
2 TABLET ORAL EVERY 4 HOURS PRN
Status: DISCONTINUED | OUTPATIENT
Start: 2024-10-29 | End: 2024-10-30 | Stop reason: HOSPADM

## 2024-10-29 RX ORDER — ESTRADIOL 1 MG/1
1 TABLET ORAL DAILY
Status: DISCONTINUED | OUTPATIENT
Start: 2024-10-29 | End: 2024-10-29

## 2024-10-29 RX ORDER — ONDANSETRON 2 MG/ML
4 INJECTION INTRAMUSCULAR; INTRAVENOUS EVERY 6 HOURS PRN
Status: DISCONTINUED | OUTPATIENT
Start: 2024-10-29 | End: 2024-10-30 | Stop reason: HOSPADM

## 2024-10-29 RX ORDER — ACETAMINOPHEN 325 MG/1
650 TABLET ORAL EVERY 4 HOURS PRN
Status: DISCONTINUED | OUTPATIENT
Start: 2024-10-29 | End: 2024-10-30 | Stop reason: HOSPADM

## 2024-10-29 RX ORDER — SODIUM CHLORIDE 0.9 % (FLUSH) 0.9 %
3 SYRINGE (ML) INJECTION EVERY 12 HOURS SCHEDULED
Status: DISCONTINUED | OUTPATIENT
Start: 2024-10-29 | End: 2024-10-29 | Stop reason: HOSPADM

## 2024-10-29 RX ORDER — KETAMINE HCL IN NACL, ISO-OSM 100MG/10ML
SYRINGE (ML) INJECTION AS NEEDED
Status: DISCONTINUED | OUTPATIENT
Start: 2024-10-29 | End: 2024-10-29 | Stop reason: SURG

## 2024-10-29 RX ORDER — SCOLOPAMINE TRANSDERMAL SYSTEM 1 MG/1
1 PATCH, EXTENDED RELEASE TRANSDERMAL ONCE
Status: DISCONTINUED | OUTPATIENT
Start: 2024-10-29 | End: 2024-10-29

## 2024-10-29 RX ORDER — PROMETHAZINE HYDROCHLORIDE 12.5 MG/1
25 TABLET ORAL ONCE AS NEEDED
Status: DISCONTINUED | OUTPATIENT
Start: 2024-10-29 | End: 2024-10-29 | Stop reason: HOSPADM

## 2024-10-29 RX ORDER — ESTRADIOL 1 MG/1
1 TABLET ORAL EVERY 24 HOURS
Status: DISCONTINUED | OUTPATIENT
Start: 2024-10-29 | End: 2024-10-30 | Stop reason: HOSPADM

## 2024-10-29 RX ORDER — AMOXICILLIN 250 MG
2 CAPSULE ORAL 2 TIMES DAILY
Status: DISCONTINUED | OUTPATIENT
Start: 2024-10-29 | End: 2024-10-30 | Stop reason: HOSPADM

## 2024-10-29 RX ORDER — ROPINIROLE 1 MG/1
2 TABLET, FILM COATED ORAL EVERY 24 HOURS
Status: DISCONTINUED | OUTPATIENT
Start: 2024-10-29 | End: 2024-10-30 | Stop reason: HOSPADM

## 2024-10-29 RX ORDER — PROPOFOL 10 MG/ML
VIAL (ML) INTRAVENOUS AS NEEDED
Status: DISCONTINUED | OUTPATIENT
Start: 2024-10-29 | End: 2024-10-29 | Stop reason: SURG

## 2024-10-29 RX ORDER — LIDOCAINE HYDROCHLORIDE 20 MG/ML
INJECTION, SOLUTION EPIDURAL; INFILTRATION; INTRACAUDAL; PERINEURAL AS NEEDED
Status: DISCONTINUED | OUTPATIENT
Start: 2024-10-29 | End: 2024-10-29 | Stop reason: SURG

## 2024-10-29 RX ORDER — MAGNESIUM HYDROXIDE 1200 MG/15ML
LIQUID ORAL AS NEEDED
Status: DISCONTINUED | OUTPATIENT
Start: 2024-10-29 | End: 2024-10-29 | Stop reason: HOSPADM

## 2024-10-29 RX ORDER — ROCURONIUM BROMIDE 10 MG/ML
INJECTION, SOLUTION INTRAVENOUS AS NEEDED
Status: DISCONTINUED | OUTPATIENT
Start: 2024-10-29 | End: 2024-10-29 | Stop reason: SURG

## 2024-10-29 RX ORDER — MIDAZOLAM HYDROCHLORIDE 2 MG/2ML
2 INJECTION, SOLUTION INTRAMUSCULAR; INTRAVENOUS ONCE
Status: COMPLETED | OUTPATIENT
Start: 2024-10-29 | End: 2024-10-29

## 2024-10-29 RX ORDER — ATENOLOL 25 MG/1
25 TABLET ORAL EVERY 24 HOURS
Status: DISCONTINUED | OUTPATIENT
Start: 2024-10-30 | End: 2024-10-29

## 2024-10-29 RX ORDER — NALOXONE HCL 0.4 MG/ML
0.1 VIAL (ML) INJECTION
Status: DISCONTINUED | OUTPATIENT
Start: 2024-10-29 | End: 2024-10-30 | Stop reason: HOSPADM

## 2024-10-29 RX ORDER — METOPROLOL TARTRATE 1 MG/ML
INJECTION, SOLUTION INTRAVENOUS AS NEEDED
Status: DISCONTINUED | OUTPATIENT
Start: 2024-10-29 | End: 2024-10-29 | Stop reason: SURG

## 2024-10-29 RX ADMIN — SUGAMMADEX 200 MG: 100 INJECTION, SOLUTION INTRAVENOUS at 11:10

## 2024-10-29 RX ADMIN — Medication 100 MCG: at 10:58

## 2024-10-29 RX ADMIN — Medication 25 MG: at 08:57

## 2024-10-29 RX ADMIN — MIDAZOLAM HYDROCHLORIDE 2 MG: 1 INJECTION, SOLUTION INTRAMUSCULAR; INTRAVENOUS at 07:29

## 2024-10-29 RX ADMIN — MIDAZOLAM HYDROCHLORIDE 2 MG: 1 INJECTION, SOLUTION INTRAMUSCULAR; INTRAVENOUS at 07:40

## 2024-10-29 RX ADMIN — ACETAMINOPHEN 1000 MG: 500 TABLET ORAL at 07:21

## 2024-10-29 RX ADMIN — ALBUMIN (HUMAN): 12.5 INJECTION, SOLUTION INTRAVENOUS at 09:09

## 2024-10-29 RX ADMIN — BUPIVACAINE HYDROCHLORIDE AND EPINEPHRINE BITARTRATE 60 ML: 2.5; .0091 INJECTION, SOLUTION EPIDURAL; INFILTRATION; INTRACAUDAL; PERINEURAL at 07:51

## 2024-10-29 RX ADMIN — HYDRALAZINE HYDROCHLORIDE 5 MG: 20 INJECTION INTRAMUSCULAR; INTRAVENOUS at 09:40

## 2024-10-29 RX ADMIN — ATENOLOL 25 MG: 25 TABLET ORAL at 18:35

## 2024-10-29 RX ADMIN — Medication 100 MCG: at 10:36

## 2024-10-29 RX ADMIN — ROCURONIUM BROMIDE 20 MG: 10 INJECTION, SOLUTION INTRAVENOUS at 09:35

## 2024-10-29 RX ADMIN — SCOPALAMINE 1 PATCH: 1 PATCH, EXTENDED RELEASE TRANSDERMAL at 07:22

## 2024-10-29 RX ADMIN — SODIUM CHLORIDE 2000 MG: 9 INJECTION, SOLUTION INTRAVENOUS at 08:19

## 2024-10-29 RX ADMIN — FENTANYL CITRATE 50 MCG: 50 INJECTION, SOLUTION INTRAMUSCULAR; INTRAVENOUS at 09:04

## 2024-10-29 RX ADMIN — DEXMEDETOMIDINE HYDROCHLORIDE 12 MCG: 100 INJECTION, SOLUTION, CONCENTRATE INTRAVENOUS at 11:16

## 2024-10-29 RX ADMIN — LABETALOL HYDROCHLORIDE 5 MG: 5 INJECTION, SOLUTION INTRAVENOUS at 09:13

## 2024-10-29 RX ADMIN — OXYCODONE HYDROCHLORIDE AND ACETAMINOPHEN 2 TABLET: 5; 325 TABLET ORAL at 20:29

## 2024-10-29 RX ADMIN — SODIUM CHLORIDE, POTASSIUM CHLORIDE, SODIUM LACTATE AND CALCIUM CHLORIDE 9 ML/HR: 600; 310; 30; 20 INJECTION, SOLUTION INTRAVENOUS at 07:31

## 2024-10-29 RX ADMIN — LABETALOL HYDROCHLORIDE 5 MG: 5 INJECTION, SOLUTION INTRAVENOUS at 09:19

## 2024-10-29 RX ADMIN — METOPROLOL TARTRATE 2 MG: 1 INJECTION, SOLUTION INTRAVENOUS at 09:45

## 2024-10-29 RX ADMIN — LIDOCAINE HYDROCHLORIDE 40 MG: 20 INJECTION, SOLUTION INTRAVENOUS at 08:14

## 2024-10-29 RX ADMIN — FENTANYL CITRATE 50 MCG: 50 INJECTION, SOLUTION INTRAMUSCULAR; INTRAVENOUS at 09:00

## 2024-10-29 RX ADMIN — ALBUMIN (HUMAN): 12.5 INJECTION, SOLUTION INTRAVENOUS at 08:25

## 2024-10-29 RX ADMIN — ONDANSETRON HYDROCHLORIDE 4 MG: 2 SOLUTION INTRAMUSCULAR; INTRAVENOUS at 08:15

## 2024-10-29 RX ADMIN — HYDROMORPHONE HYDROCHLORIDE 0.5 MG: 1 INJECTION, SOLUTION INTRAMUSCULAR; INTRAVENOUS; SUBCUTANEOUS at 11:56

## 2024-10-29 RX ADMIN — HYDROMORPHONE HYDROCHLORIDE 0.5 MG: 1 INJECTION, SOLUTION INTRAMUSCULAR; INTRAVENOUS; SUBCUTANEOUS at 12:03

## 2024-10-29 RX ADMIN — Medication 25 MG: at 09:30

## 2024-10-29 RX ADMIN — SODIUM CHLORIDE: 9 INJECTION, SOLUTION INTRAVENOUS at 08:19

## 2024-10-29 RX ADMIN — ROPINIROLE HYDROCHLORIDE 2 MG: 1 TABLET, FILM COATED ORAL at 18:35

## 2024-10-29 RX ADMIN — ESTRADIOL 1 MG: 1 TABLET ORAL at 18:35

## 2024-10-29 RX ADMIN — FENTANYL CITRATE 50 MCG: 50 INJECTION, SOLUTION INTRAMUSCULAR; INTRAVENOUS at 08:50

## 2024-10-29 RX ADMIN — METOPROLOL TARTRATE 2 MG: 1 INJECTION, SOLUTION INTRAVENOUS at 09:51

## 2024-10-29 RX ADMIN — ROCURONIUM BROMIDE 50 MG: 10 INJECTION, SOLUTION INTRAVENOUS at 08:15

## 2024-10-29 RX ADMIN — MEPERIDINE HYDROCHLORIDE 25 MG: 25 INJECTION INTRAMUSCULAR; INTRAVENOUS; SUBCUTANEOUS at 07:40

## 2024-10-29 RX ADMIN — SODIUM CHLORIDE, POTASSIUM CHLORIDE, SODIUM LACTATE AND CALCIUM CHLORIDE: 600; 310; 30; 20 INJECTION, SOLUTION INTRAVENOUS at 10:58

## 2024-10-29 RX ADMIN — FENTANYL CITRATE 50 MCG: 50 INJECTION, SOLUTION INTRAMUSCULAR; INTRAVENOUS at 08:17

## 2024-10-29 RX ADMIN — HYDROMORPHONE HYDROCHLORIDE 1 MG: 1 INJECTION, SOLUTION INTRAMUSCULAR; INTRAVENOUS; SUBCUTANEOUS at 20:55

## 2024-10-29 RX ADMIN — MAGNESIUM SULFATE HEPTAHYDRATE 1 G: 500 INJECTION, SOLUTION INTRAMUSCULAR; INTRAVENOUS at 08:50

## 2024-10-29 RX ADMIN — HYDROMORPHONE HYDROCHLORIDE 0.5 MG: 1 INJECTION, SOLUTION INTRAMUSCULAR; INTRAVENOUS; SUBCUTANEOUS at 09:38

## 2024-10-29 RX ADMIN — PROMETHAZINE HYDROCHLORIDE 12.5 MG: 25 TABLET ORAL at 13:08

## 2024-10-29 RX ADMIN — LABETALOL HYDROCHLORIDE 10 MG: 5 INJECTION, SOLUTION INTRAVENOUS at 09:25

## 2024-10-29 RX ADMIN — DEXAMETHASONE SODIUM PHOSPHATE 8 MG: 4 INJECTION, SOLUTION INTRAMUSCULAR; INTRAVENOUS at 08:15

## 2024-10-29 RX ADMIN — SENNOSIDES AND DOCUSATE SODIUM 2 TABLET: 50; 8.6 TABLET ORAL at 20:29

## 2024-10-29 RX ADMIN — HYDROMORPHONE HYDROCHLORIDE 0.25 MG: 1 INJECTION, SOLUTION INTRAMUSCULAR; INTRAVENOUS; SUBCUTANEOUS at 11:37

## 2024-10-29 RX ADMIN — PROPOFOL 200 MCG/KG/MIN: 10 INJECTION, EMULSION INTRAVENOUS at 08:25

## 2024-10-29 RX ADMIN — PROPOFOL 130 MG: 10 INJECTION, EMULSION INTRAVENOUS at 08:14

## 2024-10-29 RX ADMIN — ONDANSETRON 4 MG: 2 INJECTION INTRAMUSCULAR; INTRAVENOUS at 20:29

## 2024-10-29 NOTE — PLAN OF CARE
Goal Outcome Evaluation:           Progress: improving  Outcome Evaluation: S/p partial nephrectomy today. VSS. UOP; garner in place. Pain controlled per patient; see MAR. Nausea controlled per patient. Ambulated x1 10ft after surgery.

## 2024-10-29 NOTE — OP NOTE
NEPHRECTOMY PARTIAL LAPAROSCOPIC WITH DAVINCI ROBOT  Procedure Report    Patient Name:  Audrey Emerson  YOB: 1950    Date of Surgery:  10/29/2024     Pre-op Diagnosis:   Renal cell carcinoma of left kidney [C64.2]       Post-Op Diagnosis Codes:     * Renal cell carcinoma of left kidney [C64.2]      Procedure/CPT® Codes:    Procedure(s):  LEFT LAPAROSCOPIC PARTIAL NEPHRECTOMY WITH DAVINCI ROBOT          Staff:  Surgeon(s):  Sandra Sanders MD    Assistant: Jose Olvera RN CSA;     Anesthesia: General    Estimated Blood Loss: 210 mL    Implants:    Implant Name Type Inv. Item Serial No.  Lot No. LRB No. Used Action   CLIP LIG HEMOLOK PA LG 6CT PRP - [id number] Implant CLIP LIG HEMOLOK PA LG 6CT PRP  [employer] [id number] Left 1 Implanted   CLIP LIG HEMOLOK PA LG 6CT PRP - [id number] Implant CLIP LIG HEMOLOK PA LG 6CT PRP  [employer]  Left 1 Implanted   CLIP LIG HEMOLOK PA LG 6CT PRP - [id number] Implant CLIP LIG HEMOLOK PA LG 6CT PRP  [employer]  Left 1 Implanted   DEV CLS WND VLOC/90 ELISABETH ABS 1/2CIR SZ3/0 26MM 23CM ARIELLA - [id number] Implant DEV CLS WND VLOC/90 ELISABETH ABS 1/2CIR SZ3/0 26MM 23CM [facility]  Left 1 Implanted   HEMOST ABS SURGICEL 2X14 - [id number] Implant HEMOST ABS SURGICEL 2X14  [employer]  Left 1 Implanted   HEMOST ABS SURGICEL 2X14 - [id number] Implant HEMOST ABS SURGICEL 2X14  [employer]  [employer]  Left 1 Implanted   HEMOST ABS SURGICEL 2X14 - [id number] Implant HEMOST ABS SURGICEL 2X14  [employer]  [employer] J [employer] J  Left 1 Implanted   KT SEAL HEMOS ABS FLOSEAL MATRX FAST/PREP 10ML - [id number] Implant KT SEAL HEMOS ABS FLOSEAL MATRX FAST/PREP 10ML  [facility]  Left 1 Implanted   DEV CLS WND VLOC/90 ELISABETH ABS 1/2CIR SZ3/0 26MM 23CM ARIELLA - [id number] Implant DEV CLS WND VLOC/90 ELISABETH ABS 1/2CIR SZ3/0 26MM 23CM ARIELLA  [employer] [id number] Left 1 Implanted   CLIP LIG RASHAAD TORIBIO LG 6CT PRP - [id number] Implant CLIP LIG RASHAAD TORIBIO LG  6CT PRP  [employer] [id number] Left 1 Implanted   CLIP LIG HEMOLOK PA LG 6CT PRP - [id number] Implant CLIP LIG HEMOLOK PA LG 6CT PRP  [employer] [facility] [id number] Left 1 Implanted   KT SEAL HEMOS ABS FLOSEAL MATRX 1.5/FAST/PREP 5000/IU 10ML - [id number] Implant KT SEAL HEMOS ABS FLOSEAL MATRX 1.5/FAST/PREP 5000/IU 10ML  [facility] [id number] Left 1 Implanted       Specimen:          Specimens       ID Source Type Tests Collected By Collected At Frozen?    A Kidney, Left Tissue TISSUE PATHOLOGY EXAM   Sandra Sanders MD 10/29/24 1100 No    Description: left renal mass                Complications: None    Description of Procedure:     After appropriate consent was obtained, the patient was taken to the operating room.  After induction of general anesthesia the patient was placed in the right lateral decubitus position with the left side up.  Care was taken to pad all bony prominences to prevent neurological and vascular injury.      An 8mm trocar was placed in the left upper quadrant, just lateral to the umbilicus.  A Veress needle was inserted through this and the abdomen was insufflated.  At this point, a 5 mm direct vision port was placed.  All other ports were placed under direct vision.  8 mm trocar was placed in the left upper quadrant and 8 mm trocar in the left lower quadrant and a 10 mm trocar just above the midline in the left upper quadrant.      The robot was then docked in the standard fashion.  The colon was taken down off of the left kidney using sharp dissection.  Once the colon was reflected off, I located the lower pole of the kidney and the left ureter and elevated that. I then followed the left ureter up toward the left renal hilum.  There was a left gonadal vein, which we left in place.  Following that up further, I encountered the left renal artery and the left renal vein.  The left renal vein was just superior to the left renal artery.  I cleaned off the left renal artery to  prepare for clamping.      At this point, Gerota's fascia was taken off the left lower pole until the mass was noted and I did leave Gerota's fascia overlying the mass itself.  The tumor was then marked around the edge scoring for resection.  At this time, a bulldog clamp was placed across the left renal artery.  There was an accessory renal artery which was dissected as well.    The mass was then excised in cold fashion using scissors.  All margins appeared clear.  There was a small area with branch of the collecting system and two bleeding vessels on the resection bed.  These were sutured using Vicryl sutures.  At this point, the edges of the resection bed were cauterized using the monopolar cautery.  FloSeal was placed into the resection bed and two Surgicel bolsters were placed on that.  I then placed 4 stay-sutures to hold the bolsters in place.  These were tightened down.  At this point, the bulldog clamp on the left renal artery was removed.  Total clamp time was 29 minutes and 34 seconds.      There was no evidence of bleeding at the renal hilum with removal of the clamp.  There was no evidence of any further bleeding at the resection bed, either.  The clamps were then tightened down on the bed and LAPRA-TYs were placed. Surgicel blanket was then placed on top of that and the rest of the FloSeal was used, as well.      The patient tolerated the procedure well.  The left partial nephrectomy specimen was placed into the bag.  Robot was then undocked in the standard fashion.  The cyst was removed through the 10 mm port in the left lower quadrant.  A Jaret-Remberto Endo Close was then used to close the 10 mm trocar port. All the rest of the ports were removed.  The abdomen was desufflated.     The patient tolerated the procedure well.  Skin was closed using 4-0 Monocryl sutures.  The patient was transferred to the post-anesthesia care unit in satisfactory condition with stable vital signs.     All counts were  correct at the end of the procedure.     Assistant: Jose Olvera RN CSA; Kylee Rodriguez APRN  was responsible for performing the following activities: Retraction, Suction, Irrigation, Suturing, Closing, Placing Dressing, and Held/Positioned Camera and their skilled assistance was necessary for the success of this case.    Sandra Sanders MD     Date: 10/29/2024  Time: 11:14 EDT

## 2024-10-29 NOTE — ANESTHESIA PROCEDURE NOTES
Peripheral Block      Patient reassessed immediately prior to procedure    Patient location during procedure: pre-op  Start time: 10/29/2024 7:45 AM  Stop time: 10/29/2024 7:51 AM  Reason for block: at surgeon's request and post-op pain management  Performed by  Anesthesiologist: Flaco Crockett MD  Preanesthetic Checklist  Completed: patient identified, IV checked, site marked, risks and benefits discussed, surgical consent, monitors and equipment checked, pre-op evaluation and timeout performed  Prep:  Pt Position: sitting  Sterile barriers:partial drape, alcohol skin prep, cap, washed/disinfected hands, gloves and mask  Prep: ChloraPrep  Patient monitoring: blood pressure monitoring, EKG and continuous pulse oximetry  Procedure    Sedation: yes  Performed under: local infiltration  Guidance:ultrasound guided and landmark technique    ULTRASOUND INTERPRETATION.  Using ultrasound guidance a 22 G gauge needle was placed in close proximity to the nerve, at which point, under ultrasound guidance anesthetic was injected in the area of the nerve and spread of the anesthesia was seen on ultrasound in close proximity thereto.  There were no abnormalities seen on ultrasound; a digital image was taken; and the patient tolerated the procedure with no complications. Images:still images obtained, printed/placed on chart    Laterality:Bilateral  Block Type:erector spinae block  Injection Technique:single-shot  Needle Type:echogenic  Needle Gauge:22 G (2in)  Resistance on Injection: none    Medications Used: bupivacaine-EPINEPHrine PF (MARCAINE w/EPI) 0.25% -[id number] injection - Injection   60 mL - 10/29/2024 7:51:00 AM      Medications  Comment:30 ml each side at T-8 level.    Post Assessment  Injection Assessment: negative aspiration for heme, no paresthesia on injection and incremental injection  Patient Tolerance:comfortable throughout block  Complications:no  Additional Notes  The block or continuous infusion is requested  by the referring physician for management of postoperative pain, or pain related to a procedure. Ultrasound guidance (deemed medically necessary). Painless injection, pt was awake and conversant during the procedure without complications. Needle and surrounding structures visualized throughout procedure. No adverse reactions or complications seen during this period. Post-procedure image showed no signs of complication, and anatomy was consistent with an uncomplicated nerve blockade.  Performed by: Flaco Crockett MD

## 2024-10-29 NOTE — ANESTHESIA PREPROCEDURE EVALUATION
Anesthesia Evaluation     Patient summary reviewed and Nursing notes reviewed   history of anesthetic complications:  PONV  NPO Solid Status: > 8 hours  NPO Liquid Status: > 2 hours           Airway   Mallampati: II  TM distance: >3 FB  Neck ROM: full  No difficulty expected  Dental - normal exam     Pulmonary - normal exam    breath sounds clear to auscultation  (+) ,sleep apnea on CPAP  Cardiovascular - normal exam  Exercise tolerance: good (4-7 METS)    Patient on routine beta blocker and Beta blocker given within 24 hours of surgery  Rhythm: regular  Rate: normal    (+) valvular problems/murmurs MVP, dysrhythmias (atenolol) Tachycardia      Neuro/Psych- negative ROS  GI/Hepatic/Renal/Endo    (+) renal disease (Renal cell carcinoma)-    Musculoskeletal     Abdominal  - normal exam   Substance History - negative use     OB/GYN negative ob/gyn ROS         Other   arthritis,   history of cancer    ROS/Med Hx Other: HXof JAIDA, MVP (per pt), 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 no cp/soa. LAST STRESS ECHO 2018 - EF 55%, TRACE TRICUSPID VALVE REGURG NO MITRAL REGURG OR STENOSIS. ELM                     Anesthesia Plan    ASA 3     general and regional     (Patient understands anesthesia not responsible for dental damage. Regional anesthesia options discussed with patient. Pt accepts regional block.)  intravenous induction     Anesthetic plan, risks, benefits, and alternatives have been provided, discussed and informed consent has been obtained with: patient and spouse/significant other.    Use of blood products discussed with patient .    Plan discussed with CRNA.        CODE STATUS:

## 2024-10-29 NOTE — ANESTHESIA POSTPROCEDURE EVALUATION
Patient: Audrey Emerson    Procedure Summary       Date: 10/29/24 Room / Location: [facility] OR 08 / [facility] MAIN OR    Anesthesia Start: 0809 Anesthesia Stop: 1120    Procedure: NEPHRECTOMY PARTIAL LAPAROSCOPIC WITH DAVINCI ROBOT  Removal of part of the kidney using cameras and the da Anneliese robot (Left: Abdomen) Diagnosis:       Renal cell carcinoma of left kidney      (Renal cell carcinoma of left kidney [C64.2])    Surgeons: Sandra Sanders MD Provider: Jacki Baugh MD    Anesthesia Type: general, regional ASA Status: 3            Anesthesia Type: general, regional    Vitals  Vitals Value Taken Time   /60 10/29/24 1227   Temp 37.1 °C (98.8 °F) 10/29/24 1225   Pulse 69 10/29/24 1240   Resp 12 10/29/24 1225   SpO2 98 % 10/29/24 1240   Vitals shown include unfiled device data.        Post Anesthesia Care and Evaluation    Patient location during evaluation: bedside  Patient participation: complete - patient participated  Level of consciousness: awake  Pain management: adequate    Airway patency: patent  PONV Status: none  Cardiovascular status: acceptable and stable  Respiratory status: acceptable  Hydration status: acceptable

## 2024-10-30 VITALS
OXYGEN SATURATION: 97 % | WEIGHT: 139.77 LBS | RESPIRATION RATE: 18 BRPM | DIASTOLIC BLOOD PRESSURE: 73 MMHG | SYSTOLIC BLOOD PRESSURE: 140 MMHG | HEART RATE: 55 BPM | TEMPERATURE: 97.9 F | BODY MASS INDEX: 23.29 KG/M2 | HEIGHT: 65 IN

## 2024-10-30 LAB
ANION GAP SERPL CALCULATED.3IONS-SCNC: 8.9 MMOL/L (ref 5–15)
BUN SERPL-MCNC: 12 MG/DL (ref 8–23)
BUN/CREAT SERPL: 11.9 (ref 7–25)
CALCIUM SPEC-SCNC: 8.4 MG/DL (ref 8.6–10.5)
CHLORIDE SERPL-SCNC: 106 MMOL/L (ref 98–107)
CO2 SERPL-SCNC: 23.1 MMOL/L (ref 22–29)
CREAT SERPL-MCNC: 1.01 MG/DL (ref 0.57–1)
DEPRECATED RDW RBC AUTO: 42.8 FL (ref 37–54)
EGFRCR SERPLBLD CKD-EPI 2021: 58.9 ML/MIN/1.73
ERYTHROCYTE [DISTWIDTH] IN BLOOD BY AUTOMATED COUNT: 12.4 % (ref 12.3–15.4)
GLUCOSE SERPL-MCNC: 114 MG/DL (ref 65–99)
HCT VFR BLD AUTO: 33.7 % (ref 34–46.6)
HGB BLD-MCNC: 11.2 G/DL (ref 12–15.9)
MCH RBC QN AUTO: 31.5 PG (ref 26.6–33)
MCHC RBC AUTO-ENTMCNC: 33.2 G/DL (ref 31.5–35.7)
MCV RBC AUTO: 94.7 FL (ref 79–97)
PLATELET # BLD AUTO: 137 10*3/MM3 (ref 140–450)
PMV BLD AUTO: 11.9 FL (ref 6–12)
POTASSIUM SERPL-SCNC: 4.1 MMOL/L (ref 3.5–5.2)
RBC # BLD AUTO: 3.56 10*6/MM3 (ref 3.77–5.28)
SODIUM SERPL-SCNC: 138 MMOL/L (ref 136–145)
WBC NRBC COR # BLD AUTO: 11.15 10*3/MM3 (ref 3.4–10.8)

## 2024-10-30 PROCEDURE — A9270 NON-COVERED ITEM OR SERVICE: HCPCS | Performed by: UROLOGY

## 2024-10-30 PROCEDURE — 63710000001 SENNOSIDES-DOCUSATE 8.6-50 MG TABLET: Performed by: UROLOGY

## 2024-10-30 PROCEDURE — 99024 POSTOP FOLLOW-UP VISIT: CPT | Performed by: UROLOGY

## 2024-10-30 PROCEDURE — 80048 BASIC METABOLIC PNL TOTAL CA: CPT | Performed by: UROLOGY

## 2024-10-30 PROCEDURE — 63710000001 OXYCODONE-ACETAMINOPHEN 5-325 MG TABLET: Performed by: UROLOGY

## 2024-10-30 PROCEDURE — 85027 COMPLETE CBC AUTOMATED: CPT | Performed by: UROLOGY

## 2024-10-30 PROCEDURE — 25010000002 HYDROMORPHONE 1 MG/ML SOLUTION: Performed by: UROLOGY

## 2024-10-30 RX ORDER — IBUPROFEN 400 MG/1
600 TABLET, FILM COATED ORAL EVERY 6 HOURS PRN
Status: CANCELLED | OUTPATIENT
Start: 2024-10-30

## 2024-10-30 RX ORDER — AMOXICILLIN 250 MG
1 CAPSULE ORAL 2 TIMES DAILY
Qty: 60 TABLET | Refills: 0 | Status: SHIPPED | OUTPATIENT
Start: 2024-10-30 | End: 2024-11-29

## 2024-10-30 RX ORDER — OXYCODONE AND ACETAMINOPHEN 5; 325 MG/1; MG/1
1 TABLET ORAL EVERY 4 HOURS PRN
Qty: 20 TABLET | Refills: 0 | Status: SHIPPED | OUTPATIENT
Start: 2024-10-30 | End: 2024-11-06 | Stop reason: SDUPTHER

## 2024-10-30 RX ADMIN — OXYCODONE HYDROCHLORIDE AND ACETAMINOPHEN 2 TABLET: 5; 325 TABLET ORAL at 00:09

## 2024-10-30 RX ADMIN — HYDROMORPHONE HYDROCHLORIDE 1 MG: 1 INJECTION, SOLUTION INTRAMUSCULAR; INTRAVENOUS; SUBCUTANEOUS at 13:11

## 2024-10-30 RX ADMIN — OXYCODONE HYDROCHLORIDE AND ACETAMINOPHEN 2 TABLET: 5; 325 TABLET ORAL at 07:48

## 2024-10-30 RX ADMIN — OXYCODONE HYDROCHLORIDE AND ACETAMINOPHEN 2 TABLET: 5; 325 TABLET ORAL at 03:58

## 2024-10-30 RX ADMIN — HYDROMORPHONE HYDROCHLORIDE 1 MG: 1 INJECTION, SOLUTION INTRAMUSCULAR; INTRAVENOUS; SUBCUTANEOUS at 01:06

## 2024-10-30 RX ADMIN — OXYCODONE HYDROCHLORIDE AND ACETAMINOPHEN 2 TABLET: 5; 325 TABLET ORAL at 11:57

## 2024-10-30 RX ADMIN — SENNOSIDES AND DOCUSATE SODIUM 2 TABLET: 50; 8.6 TABLET ORAL at 11:57

## 2024-10-30 NOTE — PLAN OF CARE
Goal Outcome Evaluation:  Plan of Care Reviewed With: patient        Progress: improving  Outcome Evaluation: Education complete. Pt discharging.

## 2024-10-30 NOTE — PLAN OF CARE
Goal Outcome Evaluation:           Progress: no change  Outcome Evaluation: VSS, pain control per MAR, dressing to abdomen C/D/I, tolerating regular diet, garner intact with good UOP.Yoana Rivas RN

## 2024-10-30 NOTE — DISCHARGE SUMMARY
Date of Discharge:  10/30/2024    Discharge Diagnosis: left renal mass    Presenting Problem/History of Present Illness  Active Hospital Problems    Diagnosis  POA    **Renal cell carcinoma of left kidney [C64.2]  Yes    Renal cell carcinoma [C64.9]  Yes    Left renal mass [N28.89]  Yes      Resolved Hospital Problems   No resolved problems to display.        Hospital Course  Patient is a 74 y.o. female presented with left renal mass.  She underwent a left robotic partial nephrectomy.  On postoperative day 1 she is tolerating regular diet, ambulating and pain is controlled.      Procedures Performed    Procedure(s):  NEPHRECTOMY PARTIAL LAPAROSCOPIC WITH DAVINCI ROBOT  Removal of part of the kidney using cameras and the da Anneliese robot  -------------------       Consults:   Consults       No orders found for last 30 day(s).              Condition on Discharge: Good    Vital Signs  Temp:  [97.5 °F (36.4 °C)-99.1 °F (37.3 °C)] 97.9 °F (36.6 °C)  Heart Rate:  [54-89] 54  Resp:  [12-18] 16  BP: ()/(49-93) 124/66    Physical Exam:   Incisions c/d/i, urine clear    Discharge Disposition  Home or Self Care    Discharge Medications     Discharge Medications        New Medications        Instructions Start Date   oxyCODONE-acetaminophen 5-325 MG per tablet  Commonly known as: PERCOCET   1 tablet, Oral, Every 4 Hours PRN      sennosides-docusate 8.6-50 MG per tablet  Commonly known as: PERICOLACE   1 tablet, Oral, 2 Times Daily             Changes to Medications        Instructions Start Date   atenolol 25 MG tablet  Commonly known as: TENORMIN  What changed: when to take this   25 mg, Oral, Daily      estradiol 1 MG tablet  Commonly known as: ESTRACE  What changed: when to take this   1 mg, Oral, Daily             Continue These Medications        Instructions Start Date   aspirin 81 MG EC tablet   1 tablet Daily.      Beet Root 500 MG capsule   1 tablet, Oral, Nightly      cholecalciferol 25 MCG (1000 UT)  tablet  Commonly known as: VITAMIN D3   Take 1 tablet by mouth Every Night.      folic acid 1 MG tablet  Commonly known as: FOLVITE   Take 1 tablet by mouth Every Night.      Magnesium Citrate 200 MG tablet   200 mg, Oral, Nightly      prenatal (CLASSIC) vitamin 28-0.8 MG tablet tablet  Generic drug: prenatal vitamin   1 tablet, Oral, Nightly      promethazine 25 MG tablet  Commonly known as: PHENERGAN   25 mg, Oral, Every 6 Hours PRN      rOPINIRole 1 MG tablet  Commonly known as: REQUIP   2 mg, Oral, Every Night at Bedtime      Turmeric 500 MG capsule   1 capsule, Oral, Daily               Discharge Diet: Regular    Activity at Discharge: Walking and stairs are both allowed.  Patient should not do anything more strenuous than going up stairs.  Do not lift more than 10 pounds, or a gallon of milk.  Patient may shower starting day of discharge.  Do not rub incisions.  Patient may remove dressings from incisions in 1 to 2 days.  Regular diet with no dietary restrictions.  Drink plenty of fluids.  If you have a Delgado catheter in place, follow instructions given by nurse at the time of discharge.  Call the office if there are any concerns or questions.      Follow-up Appointments  Future Appointments   Date Time Provider Department Center   11/6/2024  9:00 AM Sandra Sanders MD [facility] U ETRING [facility]   12/24/2024  7:00 AM [facility] GUILLERMO [address] [place] [address] MAMMO [facility]   12/24/2024  8:00 AM [facility] DEXA [address] [place] [address] DEXA [facility]   3/10/2025  9:00 AM Kenzie Alvarez MD [facility] SLEEP CT [facility]   10/9/2025  9:00 AM Mathew Mehta DO [facility]     Additional Instructions for the Follow-ups that You Need to Schedule       Discharge Follow-up with Specified Provider: Dr. Sanders; 1 Week   As directed      To: Dr. Sanders   Follow Up: 1 Week   Follow Up Details: [phone]                Test Results Pending at Discharge  Pending Labs       Order Current Status    Tissue Pathology Exam In process             Sandra Sanders,  MD  10/30/24  09:13 EDT    Time: Discharge 10 min           Respiratory

## 2024-11-01 ENCOUNTER — OFFICE VISIT (OUTPATIENT)
Dept: FAMILY MEDICINE CLINIC | Facility: CLINIC | Age: 74
End: 2024-11-01
Payer: MEDICARE

## 2024-11-01 VITALS
HEART RATE: 83 BPM | WEIGHT: 140 LBS | DIASTOLIC BLOOD PRESSURE: 69 MMHG | BODY MASS INDEX: 23.32 KG/M2 | SYSTOLIC BLOOD PRESSURE: 115 MMHG | TEMPERATURE: 98 F | HEIGHT: 65 IN | OXYGEN SATURATION: 99 %

## 2024-11-01 DIAGNOSIS — C64.2 RENAL CELL CARCINOMA OF LEFT KIDNEY: ICD-10-CM

## 2024-11-01 DIAGNOSIS — Z09 HOSPITAL DISCHARGE FOLLOW-UP: Primary | ICD-10-CM

## 2024-11-01 LAB
CYTO UR: NORMAL
LAB AP CASE REPORT: NORMAL
LAB AP CLINICAL INFORMATION: NORMAL
LAB AP SPECIAL STAINS: NORMAL
LAB AP SYNOPTIC CHECKLIST: NORMAL
PATH REPORT.FINAL DX SPEC: NORMAL
PATH REPORT.GROSS SPEC: NORMAL

## 2024-11-01 NOTE — PROGRESS NOTES
Transitional Care Follow Up Visit  Subjective     Audrey Emerson is a 74 y.o. female who presents for a transitional care management visit.    Within 48 business hours after discharge our office contacted her via telephone to coordinate her care and needs.      I reviewed and discussed the details of that call along with the discharge summary, hospital problems, inpatient lab results, inpatient diagnostic studies, and consultation reports with Audrey.     Current outpatient and discharge medications have been reconciled for the patient.  Reviewed by: Mathew Mehta, DO           No data to display              Risk for Readmission (LACE) Score: 4 (10/30/2024  6:00 AM)      History of Present Illness  Hospital sadgin-xe-bog was admitted 10/29 through 10/30/2024.  She had recently been diagnosed with renal cell carcinoma and was admitted for a partial left nephrectomy.  She had surgery and short hospital stay without any complications.  She had not gotten a call from the transition her care discharge nurse at this time.  She did receive a call from Dr. Sanders's office but did not recognize the number and answer it.  Since she has been home she does have some abdominal discomfort due to the gas in her belly from insufflation and some just generalized soreness.  She does states since she has been home her appetite has been poor but she has been attempting to hydrate.     Course During Hospital Stay:  10/29 -10/30/2024     The following portions of the patient's history were reviewed and updated as appropriate: allergies, current medications, past family history, past medical history, past social history, past surgical history, and problem list.    Review of Systems   Constitutional:  Positive for appetite change and fatigue. Negative for chills and fever.   Respiratory:  Positive for cough and chest tightness. Negative for shortness of breath and wheezing.    Cardiovascular:  Negative for chest pain and  "palpitations.   Gastrointestinal:  Positive for abdominal distention, abdominal pain, diarrhea and nausea. Negative for constipation and vomiting.       Objective   /69 (BP Location: Left arm, Patient Position: Sitting, Cuff Size: Adult)   Pulse 83   Temp 98 °F (36.7 °C) (Temporal)   Ht 165.1 cm (65\")   Wt 63.5 kg (140 lb)   SpO2 99%   BMI 23.30 kg/m²   Physical Exam  Vitals and nursing note reviewed.   Constitutional:       General: She is not in acute distress.     Appearance: Normal appearance. She is normal weight.   HENT:      Head: Normocephalic.      Right Ear: Tympanic membrane, ear canal and external ear normal.      Left Ear: Tympanic membrane, ear canal and external ear normal.      Nose: Nose normal.      Mouth/Throat:      Mouth: Mucous membranes are moist.      Pharynx: Oropharynx is clear.   Eyes:      General: No scleral icterus.     Conjunctiva/sclera: Conjunctivae normal.      Pupils: Pupils are equal, round, and reactive to light.   Cardiovascular:      Rate and Rhythm: Normal rate and regular rhythm.      Pulses: Normal pulses.      Heart sounds: Normal heart sounds. No murmur heard.  Pulmonary:      Effort: Pulmonary effort is normal.      Breath sounds: Normal breath sounds. No wheezing, rhonchi or rales.   Musculoskeletal:      Cervical back: Neck supple. No rigidity or tenderness.   Lymphadenopathy:      Cervical: No cervical adenopathy.   Skin:     General: Skin is warm and dry.      Coloration: Skin is not jaundiced.      Findings: No rash.   Neurological:      General: No focal deficit present.      Mental Status: She is alert and oriented to person, place, and time.   Psychiatric:         Mood and Affect: Mood normal.         Thought Content: Thought content normal.         Judgment: Judgment normal.         Assessment & Plan   Diagnoses and all orders for this visit:    1. Hospital discharge follow-up (Primary)  Assessment & Plan:  She has done well from her hospital stay.  " She still has some not unusual postop pain and discomfort.  She does have a follow-up with urology.      2. Renal cell carcinoma of left kidney  Assessment & Plan:  She is status post partial left nephrectomy.  So far all the reports look good and would seem to confined to just this part of the kidney.  She will continue to follow-up with urology on a regular basis.

## 2024-11-01 NOTE — ASSESSMENT & PLAN NOTE
She has done well from her hospital stay.  She still has some not unusual postop pain and discomfort.  She does have a follow-up with urology.

## 2024-11-01 NOTE — ASSESSMENT & PLAN NOTE
She is status post partial left nephrectomy.  So far all the reports look good and would seem to confined to just this part of the kidney.  She will continue to follow-up with urology on a regular basis.

## 2024-11-06 ENCOUNTER — OFFICE VISIT (OUTPATIENT)
Dept: UROLOGY | Age: 74
End: 2024-11-06
Payer: MEDICARE

## 2024-11-06 ENCOUNTER — TELEPHONE (OUTPATIENT)
Dept: UROLOGY | Age: 74
End: 2024-11-06

## 2024-11-06 VITALS
WEIGHT: 136 LBS | DIASTOLIC BLOOD PRESSURE: 87 MMHG | BODY MASS INDEX: 22.66 KG/M2 | HEIGHT: 65 IN | SYSTOLIC BLOOD PRESSURE: 130 MMHG

## 2024-11-06 DIAGNOSIS — N28.89 LEFT RENAL MASS: ICD-10-CM

## 2024-11-06 DIAGNOSIS — C64.2 RENAL CELL CARCINOMA OF LEFT KIDNEY: Primary | ICD-10-CM

## 2024-11-06 LAB
BILIRUB BLD-MCNC: ABNORMAL MG/DL
CLARITY, POC: CLEAR
COLOR UR: YELLOW
EXPIRATION DATE: ABNORMAL
GLUCOSE UR STRIP-MCNC: NEGATIVE MG/DL
KETONES UR QL: ABNORMAL
LEUKOCYTE EST, POC: NEGATIVE
Lab: ABNORMAL
NITRITE UR-MCNC: NEGATIVE MG/ML
PH UR: 5.5 [PH] (ref 5–8)
PROT UR STRIP-MCNC: ABNORMAL MG/DL
RBC # UR STRIP: ABNORMAL /UL
SP GR UR: 1.02 (ref 1–1.03)
UROBILINOGEN UR QL: NORMAL

## 2024-11-06 RX ORDER — OXYCODONE AND ACETAMINOPHEN 5; 325 MG/1; MG/1
1 TABLET ORAL EVERY 4 HOURS PRN
Qty: 20 TABLET | Refills: 0 | Status: SHIPPED | OUTPATIENT
Start: 2024-11-06 | End: 2024-11-12

## 2024-11-06 NOTE — TELEPHONE ENCOUNTER
PATIENT SAID SHE SAW DR. MENESES TODAY AND SHE WAS GOING TO SEND PAIN MEDICATION TO HER PHARMACY, BUT [facility] HASN'T GOTTEN IT YET.    CALL HER WHEN SENT -[phone].

## 2024-11-06 NOTE — PROGRESS NOTES
"Chief Complaint  Renal cell carcinoma of left kidney    Subjective          Audrey Emerson presents to [facility] UROLOGY    History of Present Illness  Patient is here 1 week status post partial nephrectomy.  Pathology detailed below.  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  All margins were negative.    Oncology/Hematology History   Renal cell carcinoma of left kidney   10/4/2024 Initial Diagnosis    Renal cell carcinoma of left kidney     10/29/2024 Cancer Staged    Staging form: Kidney, AJCC 8th Edition  - Clinical stage from 10/29/2024: Stage III (ycT3a, cN0, cM0) - Signed by Sandra Sanders MD on 11/6/2024     10/29/2024 Surgery    Surgery       Procedure:  Left robotic partial nephrectomy      Location: [facility]      Completeness of resection:  No evidence of residual tumor           Objective   Vital Signs:   /87   Ht 165.1 cm (65\")   Wt 61.7 kg (136 lb)   BMI 22.63 kg/m²       Physical Exam  Vitals and nursing note reviewed.   Constitutional:       Appearance: Normal appearance. She is well-developed.   Pulmonary:      Effort: Pulmonary effort is normal.      Breath sounds: Normal air entry.   Neurological:      Mental Status: She is alert and oriented to person, place, and time.      Motor: Motor function is intact.   Psychiatric:         Mood and Affect: Mood normal.         Behavior: Behavior normal.          Result Review :   The following data was reviewed by: Sandra Sanders MD on 11/06/2024:    Results for orders placed or performed in visit on 11/06/24   POC Urinalysis Dipstick, Automated    Collection Time: 11/06/24  9:07 AM    Specimen: Urine   Result Value Ref Range    Color Yellow Yellow, Straw, Dark Yellow, Melvina    Clarity, UA Clear Clear    Specific Gravity  1.020 1.005 - 1.030    pH, Urine 5.5 5.0 - 8.0    Leukocytes Negative Negative    Nitrite, UA Negative Negative    Protein, POC 30 mg/dL (A) Negative " mg/dL    Glucose, UA Negative Negative mg/dL    Ketones, UA Trace (A) Negative    Urobilinogen, UA Normal Normal, 0.2 E.U./dL    Bilirubin Small (1+) (A) Negative    Blood, UA Large (A) Negative    Lot Number [id number]     Expiration Date 52,025               Assessment and Plan    Diagnoses and all orders for this visit:    1. Renal cell carcinoma of left kidney (Primary)  -     POC Urinalysis Dipstick, Automated  -     Ambulatory Referral to Oncology    Will refer to her oncology just to make sure she does not need any immunotherapy.  Otherwise I will see her back in 3 to 4 weeks for next postop check.        Follow Up       No follow-ups on file.  Patient was given instructions and counseling regarding her condition or for health maintenance advice. Please see specific information pulled into the AVS if appropriate.

## 2024-11-07 ENCOUNTER — CONSULT (OUTPATIENT)
Dept: ONCOLOGY | Facility: HOSPITAL | Age: 74
End: 2024-11-07
Payer: MEDICARE

## 2024-11-07 VITALS
RESPIRATION RATE: 20 BRPM | HEIGHT: 65 IN | HEART RATE: 98 BPM | DIASTOLIC BLOOD PRESSURE: 80 MMHG | TEMPERATURE: 97.2 F | WEIGHT: 136 LBS | BODY MASS INDEX: 22.66 KG/M2 | OXYGEN SATURATION: 99 % | SYSTOLIC BLOOD PRESSURE: 127 MMHG

## 2024-11-07 DIAGNOSIS — C64.2 RENAL CELL CARCINOMA OF LEFT KIDNEY: Primary | ICD-10-CM

## 2024-11-07 PROBLEM — C64.9 RENAL CELL CARCINOMA: Status: RESOLVED | Noted: 2024-10-29 | Resolved: 2024-11-07

## 2024-11-07 NOTE — PROGRESS NOTES
Chief Complaint  No chief complaint on file.    Sandra Sanders MD Schall, Mathew Sam, DO    Subjective          Audrey Emerson presents to [facility] for evaluation of recently diagnosed left kidney cancer.  This is identified after patient was having some discomfort in the left costophrenic angle.  Evaluated by urology and she underwent left partial nephrectomy 10/29/2024.  She notes  continued pain from the surgical site but she is slowly healing.  She has other masses, adenopathy.  She is not able to do all of her normal activities yet which frustrates her-she states she is usually a very active person.    Oncology/Hematology History   Renal cell carcinoma of left kidney   10/4/2024 Initial Diagnosis    Renal cell carcinoma of left kidney     10/29/2024 Cancer Staged    Staging form: Kidney, AJCC 8th Edition  - Clinical stage from 10/29/2024: Stage III (ycT3a, cN0, cM0) - Signed by Sandra Sanders MD on 11/6/2024     10/29/2024 Surgery    Surgery       Procedure:  Left robotic partial nephrectomy      Location: [facility]      Completeness of resection:  No evidence of residual tumor           Current Outpatient Medications on File Prior to Visit   Medication Sig Dispense Refill    aspirin 81 MG EC tablet 1 tablet Daily.      atenolol (TENORMIN) 25 MG tablet Take 1 tablet by mouth Daily. (Patient taking differently: Take 1 tablet by mouth Every Night.) 90 tablet 3    cholecalciferol (VITAMIN D3) 25 MCG (1000 UT) tablet Take 1 tablet by mouth Every Night.      estradiol (ESTRACE) 1 MG tablet Take 1 tablet by mouth Daily. (Patient taking differently: Take 1 tablet by mouth Every Night.) 90 tablet 3    folic acid (FOLVITE) 1 MG tablet Take 1 tablet by mouth Every Night.      Magnesium Citrate 200 MG tablet Take 1 tablet by mouth Every Night.      Misc Natural Products (Beet Root) 500 MG capsule Take 1 tablet by mouth Every Night.       oxyCODONE-acetaminophen (PERCOCET) 5-325 MG per tablet Take 1 tablet by mouth Every 4 (Four) Hours As Needed for Moderate Pain for up to 6 days. 20 tablet 0    Prenatal Vit-Fe Fumarate-FA (PRENATAL, CLASSIC, VITAMIN) 28-0.8 MG tablet tablet Take 1 tablet by mouth Every Night.      promethazine (PHENERGAN) 25 MG tablet Take 1 tablet by mouth Every 6 (Six) Hours As Needed for Nausea or Vomiting. 15 tablet 1    rOPINIRole (REQUIP) 1 MG tablet Take 2 tablets by mouth every night at bedtime. 90 tablet 3    sennosides-docusate (PERICOLACE) 8.6-50 MG per tablet Take 1 tablet by mouth 2 (Two) Times a Day for 30 days. 60 tablet 0    Turmeric 500 MG capsule Take 1 capsule by mouth Daily.       No current facility-administered medications on file prior to visit.       Allergies   Allergen Reactions    Pentazocine Hallucinations     Past Medical History:   Diagnosis Date    Acromioclavicular separation     Ankle sprain     Arthritis     Arthritis of back Teenager    Erhlers Danlos    Arthritis of neck     Bursitis of hip     Cervical disc disorder     Cholelithiasis surgery 2007    Corns and callus     Dislocated elbow     Dislocation of finger     Dislocation, shoulder     Jhonny-Danlos disease     Foot pain, bilateral 12/17/2019    Fracture, finger     Fracture, foot     Frozen shoulder 1980+    Gastrointestinal ulcer 1970    Hip arthrosis 1980+    Knee sprain     Knee swelling 1960+    Low back strain     Lumbosacral disc disease     Mitral valve disorder     mitral valve prolapse, follows with PCP, no current issues    Neck strain     Osteoporosis     Periarthritis of shoulder     PONV (postoperative nausea and vomiting)     Renal cell carcinoma of left kidney     Restless leg     Rotator cuff syndrome 1970-80    Seasonal allergies     Stress fracture     Tendinitis of knee     Tennis elbow     Uterine fibroid 1980    Wrist sprain      Past Surgical History:   Procedure Laterality Date    BREAST LUMPECTOMY Bilateral      1984     SECTION  1985    CHOLECYSTECTOMY      COLONOSCOPY  2003    COLONOSCOPY N/A 2023    Procedure: COLONOSCOPY;  Surgeon: Raul Luaghlin MD;  Location: [facility] ENDOSCOPY;  Service: Gastroenterology;  Laterality: N/A;  DIVERTICULOSIS    HYSTERECTOMY      total    MYOMECTOMY  1980    NEPHRECTOMY PARTIAL Left 10/29/2024    Procedure: NEPHRECTOMY PARTIAL LAPAROSCOPIC WITH DAVINCI ROBOT  Removal of part of the kidney using cameras and the da Anneliese robot;  Surgeon: Sandra Sanders MD;  Location: [facility] MAIN OR;  Service: Robotics - DaVinci;  Laterality: Left;    ROTATOR CUFF REPAIR Bilateral     1996    SALPINGO OOPHORECTOMY      bso    TRIGGER POINT INJECTION       Social History     Socioeconomic History    Marital status:    Tobacco Use    Smoking status: Never     Passive exposure: Past    Smokeless tobacco: Never   Vaping Use    Vaping status: Never Used   Substance and Sexual Activity    Alcohol use: No    Drug use: Never    Sexual activity: Defer     Comment: on estradiol     Family History   Problem Relation Age of Onset    Heart disease Father     Colon polyps Father         at autopsy    Cancer Father         Myleofibrosis    Stroke Mother     Breast cancer Mother     Arthritis Mother     Osteoporosis Mother     Cancer Mother         Meningoma brain    Clotting disorder Mother         Jhonny Danlos    Collagen disease Mother         Jhonny Danlos    Dislocations Mother     Scoliosis Mother     Severe sprains Mother     Cancer Sister         breast cancer x2    Arthritis Sister     Anesthesia problems Sister     Arthritis Brother     Heart disease Brother     Cancer Brother         Liver brain cancer    Liver cancer Brother     Broken bones Brother     Alcohol abuse Brother     Arthritis Daughter     Colon cancer Neg Hx        Objective   Physical Exam  Vitals reviewed. Exam conducted with a chaperone present.   Cardiovascular:      Rate and Rhythm:  "Normal rate and regular rhythm.      Heart sounds: Normal heart sounds. No murmur heard.     No gallop.   Pulmonary:      Effort: Pulmonary effort is normal.      Breath sounds: Normal breath sounds.   Abdominal:      General: Bowel sounds are normal.      Comments: Healing surgical incisions   Musculoskeletal:      Right lower leg: No edema.      Left lower leg: No edema.   Lymphadenopathy:      Cervical: No cervical adenopathy.   Psychiatric:         Mood and Affect: Mood normal.         Behavior: Behavior normal.         Vitals:    11/07/24 1108   BP: 127/80   Pulse: 98   Resp: 20   Temp: 97.2 °F (36.2 °C)   TempSrc: Temporal   SpO2: 99%   Weight: 61.7 kg (136 lb)   Height: 165.1 cm (65\")   PainSc:   6               PHQ-9 Total Score:                    Result Review :   The following data was reviewed by: Erlin Waldron MD on 11/07/2024:  Lab Results   Component Value Date    HGB 11.2 (L) 10/30/2024    HCT 33.7 (L) 10/30/2024    MCV 94.7 10/30/2024     (L) 10/30/2024    WBC 11.15 (H) 10/30/2024    NEUTROABS 3.96 10/01/2024    LYMPHSABS 1.66 10/01/2024    MONOSABS 0.52 10/01/2024    EOSABS 0.10 10/01/2024    BASOSABS 0.03 10/01/2024     Lab Results   Component Value Date    GLUCOSE 114 (H) 10/30/2024    BUN 12 10/30/2024    CREATININE 1.01 (H) 10/30/2024     10/30/2024    K 4.1 10/30/2024     10/30/2024    CO2 23.1 10/30/2024    CALCIUM 8.4 (L) 10/30/2024    PROTEINTOT 7.2 10/01/2024    ALBUMIN 4.1 10/01/2024    BILITOT 0.3 10/01/2024    ALKPHOS 94 10/01/2024    AST 25 10/01/2024    ALT 25 10/01/2024     Lab Results   Component Value Date    TSH 2.200 10/01/2024     No results found for: \"IRON\", \"LABIRON\", \"TRANSFERRIN\", \"TIBC\"  No results found for: \"LDH\", \"FERRITIN\", \"[id number]\", \"FOLATE\"  No results found for: \"PSA\", \"CEA\", \"AFP\", \"\", \"\"    Data reviewed : CT abdomen pelvis, chest x-ray, MRI abdomen reviewed.  Dr. Sanders, urology records reviewed.  Pathology report " reviewed.       Assessment and Plan    Diagnoses and all orders for this visit:    1. Renal cell carcinoma of left kidney (Primary)  Assessment & Plan:  Patient is status post left partial nephrectomy.  She is slowly recuperating from her surgery.  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  Reviewing NCCN guidelines for stage III none clear-cell renal cell carcinoma, recommendation is for surveillance.  We did discuss clinical trials in the adjuvant setting but she is not interested in travel at this time.  I would recommend surveillance.  She reports that Dr. Sanders, urology already has her planned for follow-up.  I will see her back on a yearly basis for follow-up.              Patient Follow Up: 1 year    Patient was given instructions and counseling regarding her condition or for health maintenance advice. Please see specific information pulled into the AVS if appropriate.     Erlin Waldron MD    11/7/2024

## 2024-11-07 NOTE — ASSESSMENT & PLAN NOTE
Patient is status post left partial nephrectomy.  She is slowly recuperating from her surgery.  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  Reviewing NCCN guidelines for stage III none clear-cell renal cell carcinoma, recommendation is for surveillance.  We did discuss clinical trials in the adjuvant setting but she is not interested in travel at this time.  I would recommend surveillance.  She reports that Dr. Sanders, urology already has her planned for follow-up.  I will see her back on a yearly basis for follow-up.

## 2024-11-21 ENCOUNTER — HOSPITAL ENCOUNTER (EMERGENCY)
Facility: HOSPITAL | Age: 74
Discharge: HOME | End: 2024-11-21
Attending: EMERGENCY MEDICINE | Admitting: EMERGENCY MEDICINE
Payer: MEDICARE

## 2024-11-21 ENCOUNTER — APPOINTMENT (OUTPATIENT)
Dept: CT IMAGING | Facility: HOSPITAL | Age: 74
End: 2024-11-21
Payer: MEDICARE

## 2024-11-21 VITALS
BODY MASS INDEX: 22.04 KG/M2 | HEART RATE: 66 BPM | RESPIRATION RATE: 16 BRPM | WEIGHT: 132.28 LBS | DIASTOLIC BLOOD PRESSURE: 74 MMHG | HEIGHT: 65 IN | SYSTOLIC BLOOD PRESSURE: 109 MMHG | TEMPERATURE: 97.8 F | OXYGEN SATURATION: 98 %

## 2024-11-21 DIAGNOSIS — G89.18 POSTOPERATIVE PAIN: Primary | ICD-10-CM

## 2024-11-21 LAB
ALBUMIN SERPL-MCNC: 4 G/DL (ref 3.5–5.2)
ALBUMIN/GLOB SERPL: 1.2 G/DL
ALP SERPL-CCNC: 115 U/L (ref 39–117)
ALT SERPL W P-5'-P-CCNC: 25 U/L (ref 1–33)
ANION GAP SERPL CALCULATED.3IONS-SCNC: 11.6 MMOL/L (ref 5–15)
AST SERPL-CCNC: 25 U/L (ref 1–32)
BACTERIA UR QL AUTO: NORMAL /HPF
BASOPHILS # BLD AUTO: 0.03 10*3/MM3 (ref 0–0.2)
BASOPHILS NFR BLD AUTO: 0.5 % (ref 0–1.5)
BILIRUB SERPL-MCNC: 0.5 MG/DL (ref 0–1.2)
BILIRUB UR QL STRIP: NEGATIVE
BILIRUB UR QL STRIP: NEGATIVE
BUN SERPL-MCNC: 12 MG/DL (ref 8–23)
BUN/CREAT SERPL: 12.4 (ref 7–25)
CALCIUM SPEC-SCNC: 9.8 MG/DL (ref 8.6–10.5)
CHLORIDE SERPL-SCNC: 102 MMOL/L (ref 98–107)
CLARITY UR: CLEAR
CLARITY UR: CLEAR
CO2 SERPL-SCNC: 23.4 MMOL/L (ref 22–29)
COLOR UR: YELLOW
COLOR UR: YELLOW
CREAT SERPL-MCNC: 0.97 MG/DL (ref 0.57–1)
D-LACTATE SERPL-SCNC: 1.7 MMOL/L (ref 0.5–2)
DEPRECATED RDW RBC AUTO: 41.7 FL (ref 37–54)
EGFRCR SERPLBLD CKD-EPI 2021: 61.8 ML/MIN/1.73
EOSINOPHIL # BLD AUTO: 0.09 10*3/MM3 (ref 0–0.4)
EOSINOPHIL NFR BLD AUTO: 1.5 % (ref 0.3–6.2)
ERYTHROCYTE [DISTWIDTH] IN BLOOD BY AUTOMATED COUNT: 12 % (ref 12.3–15.4)
GLOBULIN UR ELPH-MCNC: 3.3 GM/DL
GLUCOSE SERPL-MCNC: 88 MG/DL (ref 65–99)
GLUCOSE UR STRIP-MCNC: NEGATIVE MG/DL
GLUCOSE UR STRIP-MCNC: NEGATIVE MG/DL
HCT VFR BLD AUTO: 41.5 % (ref 34–46.6)
HGB BLD-MCNC: 13.7 G/DL (ref 12–15.9)
HGB UR QL STRIP.AUTO: ABNORMAL
HGB UR QL STRIP.AUTO: ABNORMAL
HOLD SPECIMEN: NORMAL
HOLD SPECIMEN: NORMAL
HYALINE CASTS UR QL AUTO: NORMAL /LPF
IMM GRANULOCYTES # BLD AUTO: 0.03 10*3/MM3 (ref 0–0.05)
IMM GRANULOCYTES NFR BLD AUTO: 0.5 % (ref 0–0.5)
KETONES UR QL STRIP: NEGATIVE
KETONES UR QL STRIP: NEGATIVE
LEUKOCYTE ESTERASE UR QL STRIP.AUTO: ABNORMAL
LEUKOCYTE ESTERASE UR QL STRIP.AUTO: ABNORMAL
LIPASE SERPL-CCNC: 102 U/L (ref 13–60)
LYMPHOCYTES # BLD AUTO: 2.26 10*3/MM3 (ref 0.7–3.1)
LYMPHOCYTES NFR BLD AUTO: 36.6 % (ref 19.6–45.3)
MCH RBC QN AUTO: 31.1 PG (ref 26.6–33)
MCHC RBC AUTO-ENTMCNC: 33 G/DL (ref 31.5–35.7)
MCV RBC AUTO: 94.3 FL (ref 79–97)
MONOCYTES # BLD AUTO: 0.51 10*3/MM3 (ref 0.1–0.9)
MONOCYTES NFR BLD AUTO: 8.3 % (ref 5–12)
NEUTROPHILS NFR BLD AUTO: 3.26 10*3/MM3 (ref 1.7–7)
NEUTROPHILS NFR BLD AUTO: 52.6 % (ref 42.7–76)
NITRITE UR QL STRIP: NEGATIVE
NITRITE UR QL STRIP: NEGATIVE
NRBC BLD AUTO-RTO: 0 /100 WBC (ref 0–0.2)
PH UR STRIP.AUTO: 6 [PH] (ref 5–8)
PH UR STRIP.AUTO: 6 [PH] (ref 5–8)
PLATELET # BLD AUTO: 210 10*3/MM3 (ref 140–450)
PMV BLD AUTO: 11.1 FL (ref 6–12)
POTASSIUM SERPL-SCNC: 3.9 MMOL/L (ref 3.5–5.2)
PROT SERPL-MCNC: 7.3 G/DL (ref 6–8.5)
PROT UR QL STRIP: NEGATIVE
PROT UR QL STRIP: NEGATIVE
RBC # BLD AUTO: 4.4 10*6/MM3 (ref 3.77–5.28)
RBC # UR STRIP: NORMAL /HPF
REF LAB TEST METHOD: NORMAL
SODIUM SERPL-SCNC: 137 MMOL/L (ref 136–145)
SP GR UR STRIP: <=1.005 (ref 1–1.03)
SP GR UR STRIP: <=1.005 (ref 1–1.03)
SQUAMOUS #/AREA URNS HPF: NORMAL /HPF
UROBILINOGEN UR QL STRIP: ABNORMAL
UROBILINOGEN UR QL STRIP: ABNORMAL
WBC # UR STRIP: NORMAL /HPF
WBC NRBC COR # BLD AUTO: 6.18 10*3/MM3 (ref 3.4–10.8)
WHOLE BLOOD HOLD COAG: NORMAL
WHOLE BLOOD HOLD SPECIMEN: NORMAL

## 2024-11-21 PROCEDURE — 80053 COMPREHEN METABOLIC PANEL: CPT | Performed by: EMERGENCY MEDICINE

## 2024-11-21 PROCEDURE — 85025 COMPLETE CBC W/AUTO DIFF WBC: CPT | Performed by: EMERGENCY MEDICINE

## 2024-11-21 PROCEDURE — 36415 COLL VENOUS BLD VENIPUNCTURE: CPT

## 2024-11-21 PROCEDURE — 96365 THER/PROPH/DIAG IV INF INIT: CPT

## 2024-11-21 PROCEDURE — 99284 EMERGENCY DEPT VISIT MOD MDM: CPT

## 2024-11-21 PROCEDURE — 83605 ASSAY OF LACTIC ACID: CPT | Performed by: EMERGENCY MEDICINE

## 2024-11-21 PROCEDURE — 83690 ASSAY OF LIPASE: CPT | Performed by: EMERGENCY MEDICINE

## 2024-11-21 PROCEDURE — 25010000002 PIPERACILLIN SOD-TAZOBACTAM PER 1 G: Performed by: EMERGENCY MEDICINE

## 2024-11-21 PROCEDURE — 81001 URINALYSIS AUTO W/SCOPE: CPT

## 2024-11-21 PROCEDURE — 87040 BLOOD CULTURE FOR BACTERIA: CPT | Performed by: EMERGENCY MEDICINE

## 2024-11-21 PROCEDURE — 74176 CT ABD & PELVIS W/O CONTRAST: CPT

## 2024-11-21 RX ORDER — SODIUM CHLORIDE 0.9 % (FLUSH) 0.9 %
10 SYRINGE (ML) INJECTION AS NEEDED
Status: DISCONTINUED | OUTPATIENT
Start: 2024-11-21 | End: 2024-11-21 | Stop reason: HOSPADM

## 2024-11-21 RX ADMIN — PIPERACILLIN AND TAZOBACTAM 3.38 G: 3; .375 INJECTION, POWDER, FOR SOLUTION INTRAVENOUS at 20:40

## 2024-11-21 NOTE — ED PROVIDER NOTES
Time: 4:54 PM EST  Date of encounter:  11/21/2024  Independent Historian/Clinical History and Information was obtained by:   Patient    History is limited by: N/A    Chief Complaint: Left flank pain.      History of Present Illness:  Patient is a 74 y.o. year old female who presents to the emergency department for evaluation of left flank pain times x 1 day.  Reports pain started at 10 AM this morning.  Patient reports she had a partial nephrectomy 3 weeks ago of left kidney pending has not been able to urinate today.  No fevers.  Reports intermittent dizziness.  Reports passing passed gas today.  With Dr. Saldana today and was told to visit ED for further workup and management.  Reports pain of 7 out of 10 during interview.      Patient Care Team  Primary Care Provider: Mathew Mehta DO    Past Medical History:     Allergies   Allergen Reactions    Pentazocine Hallucinations     Past Medical History:   Diagnosis Date    Acromioclavicular separation     Ankle sprain     Arthritis     Arthritis of back Teenager    Erhlers Danlos    Arthritis of neck     Bursitis of hip     Cervical disc disorder     Cholelithiasis surgery 2007    Corns and callus     Dislocated elbow     Dislocation of finger     Dislocation, shoulder     Jhonny-Danlos disease     Foot pain, bilateral 12/17/2019    Fracture, finger     Fracture, foot     Frozen shoulder 1980+    Gastrointestinal ulcer 1970    Hip arthrosis 1980+    Knee sprain     Knee swelling 1960+    Low back strain     Lumbosacral disc disease     Mitral valve disorder     mitral valve prolapse, follows with PCP, no current issues    Neck strain     Osteoporosis     Periarthritis of shoulder     PONV (postoperative nausea and vomiting)     Renal cell carcinoma of left kidney     Restless leg     Rotator cuff syndrome 1970-80    Seasonal allergies     Stress fracture     Tendinitis of knee     Tennis elbow     Uterine fibroid 1980    Wrist sprain      Past Surgical  History:   Procedure Laterality Date    BREAST LUMPECTOMY Bilateral     1984     SECTION      CHOLECYSTECTOMY      COLONOSCOPY  2003    COLONOSCOPY N/A 2023    Procedure: COLONOSCOPY;  Surgeon: Raul Laughlin MD;  Location: [facility] ENDOSCOPY;  Service: Gastroenterology;  Laterality: N/A;  DIVERTICULOSIS    HYSTERECTOMY      total    MYOMECTOMY  1980    NEPHRECTOMY PARTIAL Left 10/29/2024    Procedure: NEPHRECTOMY PARTIAL LAPAROSCOPIC WITH DAVINCI ROBOT  Removal of part of the kidney using cameras and the da Anneliese robot;  Surgeon: Sandra Sanders MD;  Location: [facility] MAIN OR;  Service: Robotics - DaVinci;  Laterality: Left;    ROTATOR CUFF REPAIR Bilateral     1996    SALPINGO OOPHORECTOMY      bso    TRIGGER POINT INJECTION       Family History   Problem Relation Age of Onset    Heart disease Father     Colon polyps Father         at autopsy    Cancer Father         Myleofibrosis    Stroke Mother     Breast cancer Mother     Arthritis Mother     Osteoporosis Mother     Cancer Mother         Meningoma brain    Clotting disorder Mother         Jhonny Danlos    Collagen disease Mother         Jhonny Danlos    Dislocations Mother     Scoliosis Mother     Severe sprains Mother     Cancer Sister         breast cancer x2    Arthritis Sister     Anesthesia problems Sister     Arthritis Brother     Heart disease Brother     Cancer Brother         Liver brain cancer    Liver cancer Brother     Broken bones Brother     Alcohol abuse Brother     Arthritis Daughter     Colon cancer Neg Hx        Home Medications:  Prior to Admission medications    Medication Sig Start Date End Date Taking? Authorizing Provider   aspirin 81 MG EC tablet 1 tablet Daily.    Provider, MD Dimas   atenolol (TENORMIN) 25 MG tablet Take 1 tablet by mouth Daily.  Patient taking differently: Take 1 tablet by mouth Every Night. 10/3/24   Mathew Mehta,    cholecalciferol (VITAMIN D3)  25 MCG (1000 UT) tablet Take 1 tablet by mouth Every Night.    Dimas Lopez MD   estradiol (ESTRACE) 1 MG tablet Take 1 tablet by mouth Daily.  Patient taking differently: Take 1 tablet by mouth Every Night. 10/3/24   Mathew Mehta DO   folic acid (FOLVITE) 1 MG tablet Take 1 tablet by mouth Every Night.    Dimas Lopez MD   Magnesium Citrate 200 MG tablet Take 1 tablet by mouth Every Night.    Dimas Lopez MD   Misc Natural Products (Beet Root) 500 MG capsule Take 1 tablet by mouth Every Night.    Dimas Lopez MD   Prenatal Vit-Fe Fumarate-FA (PRENATAL, CLASSIC, VITAMIN) 28-0.8 MG tablet tablet Take 1 tablet by mouth Every Night.    Dimas Lopez MD   promethazine (PHENERGAN) 25 MG tablet Take 1 tablet by mouth Every 6 (Six) Hours As Needed for Nausea or Vomiting. 10/3/24   Mathew Mehta DO   rOPINIRole (REQUIP) 1 MG tablet Take 2 tablets by mouth every night at bedtime. 10/3/24   Mathew Mehta DO   sennosides-docusate (PERICOLACE) 8.6-50 MG per tablet Take 1 tablet by mouth 2 (Two) Times a Day for 30 days. 10/30/24 11/29/24  Sandra Sanders MD   Turmeric 500 MG capsule Take 1 capsule by mouth Daily.    Dimas Lopez MD        Social History:   Social History     Tobacco Use    Smoking status: Never     Passive exposure: Past    Smokeless tobacco: Never   Vaping Use    Vaping status: Never Used   Substance Use Topics    Alcohol use: No    Drug use: Never         Review of Systems:  Review of Systems   Constitutional:  Negative for chills and fever.   HENT:  Negative for congestion, rhinorrhea and sore throat.    Eyes:  Negative for pain and visual disturbance.   Respiratory:  Negative for apnea, cough, chest tightness and shortness of breath.    Cardiovascular:  Negative for chest pain and palpitations.   Gastrointestinal:  Negative for abdominal pain, diarrhea, nausea and vomiting.   Genitourinary:  Positive for flank pain.  "Negative for difficulty urinating and dysuria.   Musculoskeletal:  Negative for joint swelling and myalgias.   Skin:  Negative for color change.   Neurological:  Negative for seizures and headaches.   Psychiatric/Behavioral: Negative.     All other systems reviewed and are negative.       Physical Exam:  /73   Pulse 60   Temp 97.8 °F (36.6 °C) (Oral)   Resp 16   Ht 165.1 cm (65\")   Wt 60 kg (132 lb 4.4 oz)   SpO2 99%   BMI 22.01 kg/m²     Physical Exam  Vitals and nursing note reviewed.   Constitutional:       General: She is not in acute distress.     Appearance: Normal appearance. She is not toxic-appearing.   HENT:      Head: Normocephalic and atraumatic.      Jaw: There is normal jaw occlusion.   Eyes:      General: Lids are normal.      Extraocular Movements: Extraocular movements intact.      Conjunctiva/sclera: Conjunctivae normal.      Pupils: Pupils are equal, round, and reactive to light.   Cardiovascular:      Rate and Rhythm: Normal rate and regular rhythm.      Pulses: Normal pulses.      Heart sounds: Normal heart sounds.   Pulmonary:      Effort: Pulmonary effort is normal. No respiratory distress.      Breath sounds: Normal breath sounds. No wheezing or rhonchi.   Abdominal:      General: Abdomen is flat.      Palpations: Abdomen is soft.      Tenderness: There is no abdominal tenderness. There is no guarding or rebound.   Musculoskeletal:         General: Normal range of motion.      Cervical back: Normal range of motion and neck supple.      Right lower leg: No edema.      Left lower leg: No edema.   Skin:     General: Skin is warm and dry.   Neurological:      Mental Status: She is alert and oriented to person, place, and time. Mental status is at baseline.   Psychiatric:         Mood and Affect: Mood normal.                  Procedures:  Procedures      Medical Decision Making:      Comorbidities that affect care:    Erfrieda Braswell, renal cell carcinoma    External Notes " reviewed:    Previous Clinic Note: Oncology office visit for renal cell carcinoma management      The following orders were placed and all results were independently analyzed by me:  Orders Placed This Encounter   Procedures    Blood Culture - Blood,    Blood Culture - Blood,    CT Abdomen Pelvis Without Contrast    Center Draw    Comprehensive Metabolic Panel    Lipase    Urinalysis With Microscopic If Indicated (No Culture) - Urine, Clean Catch    Lactic Acid, Plasma    CBC Auto Differential    Urinalysis With Culture If Indicated - Urine, Clean Catch    Urinalysis, Microscopic Only - Urine, Clean Catch    NPO Diet NPO Type: Strict NPO    Undress & Gown    Urology (on-call MD unless specified)    Inpatient Hospitalist Consult    Insert Peripheral IV    CBC & Differential    Green Top (Gel)    Lavender Top    Gold Top - SST    Light Blue Top       Medications Given in the Emergency Department:  Medications   sodium chloride 0.9 % flush 10 mL (has no administration in time range)   vancomycin 1250 mg/250 mL 0.9% NS IVPB ([facility]) (1,250 mg Intravenous Not Given 11/21/24 2042)   piperacillin-tazobactam (ZOSYN) IVPB 3.375 g IVPB in 100 mL NS (VTB) (0 g Intravenous Stopped 11/21/24 2117)        ED Course:    ED Course as of 11/21/24 2117   Thu Nov 21, 2024 1658 --- PROVIDER IN TRIAGE NOTE ---    The patient was evaluated by Yefri pelletier in triage. Orders were placed and the patient is currently awaiting disposition.    [CB]      ED Course User Index  [CB] Yefri Herman, PACynthiaC       Labs:    Lab Results (last 24 hours)       Procedure Component Value Units Date/Time    CBC & Differential [[id number]]  (Abnormal) Collected: 11/21/24 1645    Specimen: Blood from Arm, Right Updated: 11/21/24 1700    Narrative:      The following orders were created for panel order CBC & Differential.  Procedure                               Abnormality         Status                     ---------                                -----------         ------                     CBC Auto Differential[[id number]]        Abnormal            Final result                 Please view results for these tests on the individual orders.    Comprehensive Metabolic Panel [[id number]] Collected: 11/21/24 1645    Specimen: Blood from Arm, Right Updated: 11/21/24 1736     Glucose 88 mg/dL      BUN 12 mg/dL      Creatinine 0.97 mg/dL      Sodium 137 mmol/L      Potassium 3.9 mmol/L      Chloride 102 mmol/L      CO2 23.4 mmol/L      Calcium 9.8 mg/dL      Total Protein 7.3 g/dL      Albumin 4.0 g/dL      ALT (SGPT) 25 U/L      AST (SGOT) 25 U/L      Alkaline Phosphatase 115 U/L      Total Bilirubin 0.5 mg/dL      Globulin 3.3 gm/dL      A/G Ratio 1.2 g/dL      BUN/Creatinine Ratio 12.4     Anion Gap 11.6 mmol/L      eGFR 61.8 mL/min/1.73     Narrative:      GFR Normal >60  Chronic Kidney Disease <60  Kidney Failure <15    The GFR formula is only valid for adults with stable renal function between ages 18 and 70.    Lipase [[id number]]  (Abnormal) Collected: 11/21/24 1645    Specimen: Blood from Arm, Right Updated: 11/21/24 1736     Lipase 102 U/L     Lactic Acid, Plasma [[id number]]  (Normal) Collected: 11/21/24 1645    Specimen: Blood from Arm, Right Updated: 11/21/24 1734     Lactate 1.7 mmol/L     CBC Auto Differential [[id number]]  (Abnormal) Collected: 11/21/24 1645    Specimen: Blood from Arm, Right Updated: 11/21/24 1700     WBC 6.18 10*3/mm3      RBC 4.40 10*6/mm3      Hemoglobin 13.7 g/dL      Hematocrit 41.5 %      MCV 94.3 fL      MCH 31.1 pg      MCHC 33.0 g/dL      RDW 12.0 %      RDW-SD 41.7 fl      MPV 11.1 fL      Platelets 210 10*3/mm3      Neutrophil % 52.6 %      Lymphocyte % 36.6 %      Monocyte % 8.3 %      Eosinophil % 1.5 %      Basophil % 0.5 %      Immature Grans % 0.5 %      Neutrophils, Absolute 3.26 10*3/mm3      Lymphocytes, Absolute 2.26 10*3/mm3      Monocytes, Absolute 0.51 10*3/mm3      Eosinophils, Absolute 0.09 10*3/mm3       Basophils, Absolute 0.03 10*3/mm3      Immature Grans, Absolute 0.03 10*3/mm3      nRBC 0.0 /100 WBC     Urinalysis With Microscopic If Indicated (No Culture) - Urine, Clean Catch [[id number]]  (Abnormal) Collected: 11/21/24 1836    Specimen: Urine, Clean Catch Updated: 11/21/24 1850     Color, UA Yellow     Appearance, UA Clear     pH, UA 6.0     Specific Gravity, UA <=1.005     Glucose, UA Negative     Ketones, UA Negative     Bilirubin, UA Negative     Blood, UA Moderate (2+)     Protein, UA Negative     Leuk Esterase, UA Trace     Nitrite, UA Negative     Urobilinogen, UA 0.2 E.U./dL    Urinalysis With Culture If Indicated - Urine, Clean Catch [[id number]]  (Abnormal) Collected: 11/21/24 1836    Specimen: Urine, Clean Catch Updated: 11/21/24 1850     Color, UA Yellow     Appearance, UA Clear     pH, UA 6.0     Specific Gravity, UA <=1.005     Glucose, UA Negative     Ketones, UA Negative     Bilirubin, UA Negative     Blood, UA Moderate (2+)     Protein, UA Negative     Leuk Esterase, UA Trace     Nitrite, UA Negative     Urobilinogen, UA 0.2 E.U./dL    Narrative:      In absence of clinical symptoms, the presence of pyuria, bacteria, and/or nitrites on the urinalysis result does not correlate with infection.    Urinalysis, Microscopic Only - Urine, Clean Catch [[id number]] Collected: 11/21/24 1836    Specimen: Urine, Clean Catch Updated: 11/21/24 1850     RBC, UA 0-2 /HPF      WBC, UA 0-2 /HPF      Comment: Urine culture not indicated.        Bacteria, UA None Seen /HPF      Squamous Epithelial Cells, UA 0-2 /HPF      Hyaline Casts, UA None Seen /LPF      Methodology Automated Microscopy    Blood Culture - Blood, Arm, Right [[id number]] Collected: 11/21/24 2039    Specimen: Blood from Arm, Right Updated: 11/21/24 2046    Blood Culture - Blood, Arm, Right [[id number]] Collected: 11/21/24 2039    Specimen: Blood from Arm, Right Updated: 11/21/24 2046             Imaging:    CT Abdomen Pelvis Without Contrast    Result  Date: 11/21/2024  CT ABDOMEN PELVIS WO CONTRAST Date of Exam: 11/21/2024 6:05 PM EST Indication: flank pain s/p partial nephrectomy. Comparison: MR abdomen 10/3/2024, CT abdomen pelvis 9/24/2024 Technique: Axial CT images were obtained of the abdomen and pelvis without the administration of contrast. Reconstructed coronal and sagittal images were also obtained. Automated exposure control and iterative construction methods were used. Findings: Lower thorax:Lung bases are clear. No coronary artery calcifications seen in the visualized heart. No pericardial effusion.  Liver: No focal hepatic lesions seen.  Normal hepatic size. Normal density of the liver. Gallbladder and bile ducts: Cholecystectomy clips. No intra- or extra- hepatic biliary ductal dilatation. Spleen: Normal appearance of the spleen. Pancreas: Normal appearance of the pancreas. Main pancreatic duct is nondilated. Adrenals: Normal appearance of the adrenal glands. Kidneys: Status post partial left nephrectomy. At the nephrectomy site there is an ill-defined gas and fluid collection measuring approximately 3.7 x 3.0 cm (image 69). No nephrolithiasis.  Normal caliber of the ureters. Bowel: Normal caliber of the bowels. Appendix is not visualized but no secondary signs of appendicitis. Diverticulosis without diverticulitis. Pelvis: Normal appearance of the bladder. Hysterectomy. Ovaries are not visualized. Peritoneum: No free air. No free fluid. No peritoneal nodularity. Vessels: Normal aortic caliber. Atherosclerotic calcification of the aorta. Lymph nodes: No enlarged or suspicious adenopathy. Bones: No acute osseous abnormality. Degenerative changes of the spine. Soft tissues: Unremarkable appearance of the soft tissues.     Impression: Postoperative changes of partial left nephrectomy. At the surgical site there is an ill-defined gas and fluid collection which is most concerning for infection. Electronically Signed: J Carlos Sanchez MD  11/21/2024 6:45  PM EST  Workstation ID: [id number]       Differential Diagnosis and Discussion:    Abdominal Pain: Based on the patient's signs and symptoms, I considered abdominal aortic aneurysm, small bowel obstruction, pancreatitis, acute cholecystitis, acute appendecitis, peptic ulcer disease, gastritis, colitis, endocrine disorders, irritable bowel syndrome and other differential diagnosis an etiology of the patient's abdominal pain.    All labs were reviewed and interpreted by me.  CT scan radiology impression was interpreted by me.    MDM  Number of Diagnoses or Management Options  Postoperative pain  Diagnosis management comments: In summary this is a 74-year-old female who presents to the emerged department for evaluation of left flank pain in the setting of being 3 weeks postop from a left-sided partial nephrectomy.  Surgical incision sites are clean dry and intact.  CBC independently reviewed and interpreted by me and shows no critical abnormalities.  CMP independently reviewed and interpreted by me and shows no critical abnormalities.  Urinalysis independently reviewed interpreted by me reveals mild hematuria, no infection.  CT scan abdomen pelvis reviewed by me reveals a small 3.7 x 3 cm area with ill-defined borders that according to radiologist is possibly consistent with an infection however after discussion with patient's operating surgeon this is most likely the Surgicel that was placed during the operation.  Single dose of IV antibiotics, Zosyn, was administered in the emerged department and patient will be discharged home.  Very strict return to ER and follow-up instructions have been provided to the patient.                         Patient Care Considerations:    SEPSIS was considered but is NOT present in the emergency department as SIRS criteria is not present.      Consultants/Shared Management Plan:    Consultant: I have discussed the case with Dr. Saldana who states even though CT scan reads probable  postoperative infection the size and location and description are most consistent with Surgicel placement during the operation.  She reports she will follow-up with the patient tomorrow and patient will receive single dose of IV antibiotics in the emergency department prior to discharge.    Social Determinants of Health:    Patient is independent, reliable, and has access to care.       Disposition and Care Coordination:    Discharged: I considered escalation of care by admitting this patient to the hospital, however after discussion with patient's operating surgeon she will receive a dose of IV antibiotics emergency department and be discharged home with follow-up tomorrow    I have explained the patient´s condition, diagnoses and treatment plan based on the information available to me at this time. I have answered questions and addressed any concerns. The patient has a good  understanding of the patient´s diagnosis, condition, and treatment plan as can be expected at this point. The vital signs have been stable. The patient´s condition is stable and appropriate for discharge from the emergency department.      The patient will pursue further outpatient evaluation with the primary care physician or other designated or consulting physician as outlined in the discharge instructions. They are agreeable to this plan of care and follow-up instructions have been explained in detail. The patient has received these instructions in written format and has expressed an understanding of the discharge instructions. The patient is aware that any significant change in condition or worsening of symptoms should prompt an immediate return to this or the closest emergency department or call to 911.  I have explained discharge medications and the need for follow up with the patient/caretakers. This was also printed in the discharge instructions. Patient was discharged with the following medications and follow up:      Medication List         Changed      atenolol 25 MG tablet  Commonly known as: TENORMIN  Take 1 tablet by mouth Daily.  What changed: when to take this     estradiol 1 MG tablet  Commonly known as: ESTRACE  Take 1 tablet by mouth Daily.  What changed: when to take this           Sandra Sanders MD  [address] [address]  [phone]             Final diagnoses:   Postoperative pain        ED Disposition       ED Disposition   Discharge    Condition   Stable    Comment   --               This medical record created using voice recognition software.             Ryan Yuen MD  11/21/24 3884

## 2024-11-26 LAB
BACTERIA SPEC AEROBE CULT: NORMAL
BACTERIA SPEC AEROBE CULT: NORMAL

## 2024-12-02 ENCOUNTER — OFFICE VISIT (OUTPATIENT)
Dept: UROLOGY | Age: 74
End: 2024-12-02
Payer: MEDICARE

## 2024-12-02 VITALS
DIASTOLIC BLOOD PRESSURE: 86 MMHG | BODY MASS INDEX: 21.99 KG/M2 | WEIGHT: 132 LBS | SYSTOLIC BLOOD PRESSURE: 135 MMHG | HEIGHT: 65 IN

## 2024-12-02 DIAGNOSIS — C64.2 RENAL CELL CARCINOMA OF LEFT KIDNEY: Primary | ICD-10-CM

## 2024-12-02 LAB
BILIRUB BLD-MCNC: NEGATIVE MG/DL
CLARITY, POC: CLEAR
COLOR UR: YELLOW
EXPIRATION DATE: ABNORMAL
GLUCOSE UR STRIP-MCNC: NEGATIVE MG/DL
KETONES UR QL: ABNORMAL
LEUKOCYTE EST, POC: ABNORMAL
Lab: ABNORMAL
NITRITE UR-MCNC: NEGATIVE MG/ML
PH UR: 5.5 [PH] (ref 5–8)
PROT UR STRIP-MCNC: ABNORMAL MG/DL
RBC # UR STRIP: ABNORMAL /UL
SP GR UR: 1.02 (ref 1–1.03)
UROBILINOGEN UR QL: ABNORMAL

## 2024-12-02 PROCEDURE — 99024 POSTOP FOLLOW-UP VISIT: CPT | Performed by: UROLOGY

## 2024-12-02 PROCEDURE — 1159F MED LIST DOCD IN RCRD: CPT | Performed by: UROLOGY

## 2024-12-02 PROCEDURE — 1160F RVW MEDS BY RX/DR IN RCRD: CPT | Performed by: UROLOGY

## 2024-12-02 PROCEDURE — 81003 URINALYSIS AUTO W/O SCOPE: CPT | Performed by: UROLOGY

## 2024-12-02 NOTE — PROGRESS NOTES
"Chief Complaint  Renal cell carcinoma of left kidney    Subjective          Audrey Emerson presents to [facility] UROLOGY  History of Present Illness  Patient is here 1 month status post left robotic partial nephrectomy.  She did have an ER visit since her last visit with me.  That was normal with no signs of infection or any other issues.  She is feeling better today.  No new complaints.    Oncology/Hematology History   Renal cell carcinoma of left kidney   10/4/2024 Initial Diagnosis    Renal cell carcinoma of left kidney     10/29/2024 Cancer Staged    Staging form: Kidney, AJCC 8th Edition  - Clinical stage from 10/29/2024: Stage III (ycT3a, cN0, cM0) - Signed by Sandra Sanders MD on 11/6/2024     10/29/2024 Surgery    Surgery       Procedure:  Left robotic partial nephrectomy      Location: [facility]      Completeness of resection:  No evidence of residual tumor           Objective   Vital Signs:   /86   Ht 165.1 cm (65\")   Wt 59.9 kg (132 lb)   BMI 21.97 kg/m²       Physical Exam  Vitals and nursing note reviewed.   Constitutional:       Appearance: Normal appearance. She is well-developed.   Pulmonary:      Effort: Pulmonary effort is normal.      Breath sounds: Normal air entry.   Neurological:      Mental Status: She is alert and oriented to person, place, and time.      Motor: Motor function is intact.   Psychiatric:         Mood and Affect: Mood normal.         Behavior: Behavior normal.          Result Review :   The following data was reviewed by: Sandra Sadners MD on 12/02/2024:    Results for orders placed or performed in visit on 12/02/24   POC Urinalysis Dipstick, Automated    Collection Time: 12/02/24  9:52 AM    Specimen: Urine   Result Value Ref Range    Color Yellow Yellow, Straw, Dark Yellow, Melvina    Clarity, UA Clear Clear    Specific Gravity  1.020 1.005 - 1.030    pH, Urine 5.5 5.0 - 8.0    Leukocytes Trace (A) Negative    Nitrite, UA Negative " Negative    Protein,  mg/dL (A) Negative mg/dL    Glucose, UA Negative Negative mg/dL    Ketones, UA Trace (A) Negative    Urobilinogen, UA 0.2 E.U./dL Normal, 0.2 E.U./dL    Bilirubin Negative Negative    Blood, UA Large (A) Negative    Lot Number [id number]     Expiration Date 92,025           Study Result    Narrative & Impression   CT ABDOMEN PELVIS WO CONTRAST     Date of Exam: 11/21/2024 6:05 PM EST     Indication: flank pain s/p partial nephrectomy.     Comparison: MR abdomen 10/3/2024, CT abdomen pelvis 9/24/2024     Technique: Axial CT images were obtained of the abdomen and pelvis without the administration of contrast. Reconstructed coronal and sagittal images were also obtained. Automated exposure control and iterative construction methods were used.        Findings:  Lower thorax:Lung bases are clear. No coronary artery calcifications seen in the visualized heart. No pericardial effusion.       Liver: No focal hepatic lesions seen.  Normal hepatic size. Normal density of the liver.      Gallbladder and bile ducts: Cholecystectomy clips. No intra- or extra- hepatic biliary ductal dilatation.     Spleen: Normal appearance of the spleen.     Pancreas: Normal appearance of the pancreas. Main pancreatic duct is nondilated.     Adrenals: Normal appearance of the adrenal glands.     Kidneys: Status post partial left nephrectomy. At the nephrectomy site there is an ill-defined gas and fluid collection measuring approximately 3.7 x 3.0 cm (image 69). No nephrolithiasis.  Normal caliber of the ureters.      Bowel: Normal caliber of the bowels. Appendix is not visualized but no secondary signs of appendicitis. Diverticulosis without diverticulitis.     Pelvis: Normal appearance of the bladder. Hysterectomy. Ovaries are not visualized.     Peritoneum: No free air. No free fluid. No peritoneal nodularity.      Vessels: Normal aortic caliber. Atherosclerotic calcification of the aorta.     Lymph nodes: No  enlarged or suspicious adenopathy.     Bones: No acute osseous abnormality. Degenerative changes of the spine.     Soft tissues: Unremarkable appearance of the soft tissues.        IMPRESSION:  Impression:  Postoperative changes of partial left nephrectomy. At the surgical site there is an ill-defined gas and fluid collection which is most concerning for infection.                 Electronically Signed: J Carlos Sanchez MD    11/21/2024 6:45 PM EST    Workstation ID: [id number]     CBC          10/1/2024    10:03 10/30/2024    04:49 11/21/2024    16:45   CBC   WBC 6.30  11.15  6.18    RBC 4.68  3.56  4.40    Hemoglobin 15.4  11.2  13.7    Hematocrit 45.4  33.7  41.5    MCV 97.0  94.7  94.3    MCH 32.9  31.5  31.1    MCHC 33.9  33.2  33.0    RDW 11.8  12.4  12.0    Platelets 212  137  210         BMP          10/1/2024    10:03 10/30/2024    04:49 11/21/2024    16:45   BMP   BUN 18  12  12    Creatinine 1.12  1.01  0.97    Sodium 137  138  137    Potassium 4.5  4.1  3.9    Chloride 100  106  102    CO2 25.3  23.1  23.4    Calcium 9.8  8.4  9.8              Assessment and Plan    Diagnoses and all orders for this visit:    1. Renal cell carcinoma of left kidney (Primary)  -     POC Urinalysis Dipstick, Automated  -     CT Abdomen Pelvis With & Without Contrast; Future    Will see her back in 5 months with a CT scan prior.  She will call sooner if she has any other issues.  She can gradually go back to all of her normal activities.        Follow Up       No follow-ups on file.  Patient was given instructions and counseling regarding her condition or for health maintenance advice. Please see specific information pulled into the AVS if appropriate.

## 2024-12-24 ENCOUNTER — HOSPITAL ENCOUNTER (OUTPATIENT)
Dept: MAMMOGRAPHY | Facility: HOSPITAL | Age: 74
Discharge: HOME | End: 2024-12-24
Payer: MEDICARE

## 2024-12-24 ENCOUNTER — HOSPITAL ENCOUNTER (OUTPATIENT)
Dept: BONE DENSITY | Facility: HOSPITAL | Age: 74
Discharge: HOME | End: 2024-12-24
Payer: MEDICARE

## 2024-12-24 DIAGNOSIS — M81.0 AGE-RELATED OSTEOPOROSIS WITHOUT CURRENT PATHOLOGICAL FRACTURE: ICD-10-CM

## 2024-12-24 DIAGNOSIS — Z12.31 ENCOUNTER FOR SCREENING MAMMOGRAM FOR MALIGNANT NEOPLASM OF BREAST: ICD-10-CM

## 2024-12-24 PROCEDURE — 77063 BREAST TOMOSYNTHESIS BI: CPT

## 2024-12-24 PROCEDURE — 77067 SCR MAMMO BI INCL CAD: CPT

## 2024-12-24 PROCEDURE — 77080 DXA BONE DENSITY AXIAL: CPT

## 2025-02-03 RX ORDER — ROPINIROLE 1 MG/1
2 TABLET, FILM COATED ORAL
Qty: 90 TABLET | Refills: 3 | Status: SHIPPED | OUTPATIENT
Start: 2025-02-03

## 2025-02-20 ENCOUNTER — OFFICE VISIT (OUTPATIENT)
Dept: FAMILY MEDICINE CLINIC | Facility: CLINIC | Age: 75
End: 2025-02-20
Payer: MEDICARE

## 2025-02-20 VITALS
SYSTOLIC BLOOD PRESSURE: 135 MMHG | DIASTOLIC BLOOD PRESSURE: 87 MMHG | BODY MASS INDEX: 21.9 KG/M2 | WEIGHT: 131.6 LBS | HEART RATE: 68 BPM | TEMPERATURE: 98 F | OXYGEN SATURATION: 98 %

## 2025-02-20 DIAGNOSIS — R05.9 COUGH, UNSPECIFIED TYPE: Primary | ICD-10-CM

## 2025-02-20 DIAGNOSIS — H66.91 OTITIS OF RIGHT EAR: ICD-10-CM

## 2025-02-20 LAB
EXPIRATION DATE: NORMAL
FLUAV AG UPPER RESP QL IA.RAPID: NOT DETECTED
FLUBV AG UPPER RESP QL IA.RAPID: NOT DETECTED
INTERNAL CONTROL: NORMAL
Lab: NORMAL
SARS-COV-2 AG UPPER RESP QL IA.RAPID: NOT DETECTED

## 2025-02-20 PROCEDURE — 99214 OFFICE O/P EST MOD 30 MIN: CPT | Performed by: STUDENT IN AN ORGANIZED HEALTH CARE EDUCATION/TRAINING PROGRAM

## 2025-02-20 PROCEDURE — 1160F RVW MEDS BY RX/DR IN RCRD: CPT | Performed by: STUDENT IN AN ORGANIZED HEALTH CARE EDUCATION/TRAINING PROGRAM

## 2025-02-20 PROCEDURE — 87428 SARSCOV & INF VIR A&B AG IA: CPT | Performed by: STUDENT IN AN ORGANIZED HEALTH CARE EDUCATION/TRAINING PROGRAM

## 2025-02-20 PROCEDURE — 1159F MED LIST DOCD IN RCRD: CPT | Performed by: STUDENT IN AN ORGANIZED HEALTH CARE EDUCATION/TRAINING PROGRAM

## 2025-02-20 PROCEDURE — 1125F AMNT PAIN NOTED PAIN PRSNT: CPT | Performed by: STUDENT IN AN ORGANIZED HEALTH CARE EDUCATION/TRAINING PROGRAM

## 2025-02-20 NOTE — PROGRESS NOTES
Chief Complaint  URI (Has been sick since last Friday ) and Earache (Feels stopped up and hurts down her neck )    Subjective      Audrey Emerson is a 74 y.o. female who presents to [facility]     Same day visit:     Pt comes with cc of URI (Has been sick since last Friday ) and Earache (Feels stopped up and hurts down her neck ). Otoscopy bulging tympanic membrane with redness. Will treat as otitis. Augmentin prescribed.     Objective   Vital Signs:   Vitals:    02/20/25 1421   BP: 135/87   Pulse: 68   Temp: 98 °F (36.7 °C)   SpO2: 98%   Weight: 59.7 kg (131 lb 9.6 oz)     Body mass index is 21.9 kg/m².    Wt Readings from Last 3 Encounters:   02/20/25 59.7 kg (131 lb 9.6 oz)   12/02/24 59.9 kg (132 lb)   11/21/24 60 kg (132 lb 4.4 oz)     BP Readings from Last 3 Encounters:   02/20/25 135/87   12/02/24 135/86   11/21/24 109/74       Health Maintenance   Topic Date Due    HEPATITIS C SCREENING  Never done    ZOSTER VACCINE (2 of 2) 05/27/2019    COVID-19 Vaccine (1 - 2024-25 season) Never done    ANNUAL WELLNESS VISIT  10/03/2025    MAMMOGRAM  12/24/2026    DXA SCAN  12/24/2026    TDAP/TD VACCINES (2 - Td or Tdap) 01/01/2030    COLORECTAL CANCER SCREENING  12/06/2033    INFLUENZA VACCINE  Completed    Pneumococcal Vaccine 50+  Completed       Physical Exam  Constitutional:       Comments: Otoscopy bulging tympanic membrane with redness   Skin:     Capillary Refill: Capillary refill takes less than 2 seconds.   Neurological:      General: No focal deficit present.      Mental Status: She is alert and oriented to person, place, and time.            Assessment & Plan  Cough, unspecified type    Orders:    POCT SARS-CoV-2 Antigen CAPRI + Flu    Otitis of right ear  Augmentin   Orders:    amoxicillin-clavulanate (AUGMENTIN) 875-125 MG per tablet; Take 1 tablet by mouth 2 (Two) Times a Day for 7 days.         BMI is within normal parameters. No other follow-up for BMI required.     I spent  20 minutes caring for Audrey on this date of service. This time includes time spent by me in the following activities:preparing for the visit, reviewing tests, obtaining and/or reviewing a separately obtained history, performing a medically appropriate examination and/or evaluation, counseling and educating the patient/family/caregiver, ordering medications, tests, or procedures, referring and communicating with other health care professionals, documenting information in the medical record, independently interpreting results and communicating that information with the patient/family/caregiver, care coordination.    FOLLOW UP  Return if symptoms worsen or fail to improve.  Patient was given instructions and counseling regarding her condition or for health maintenance advice. Please see specific information pulled into the AVS if appropriate.       Dax Rowe MD  02/20/25  15:07 EST    CURRENT & DISCONTINUED MEDICATIONS  Current Outpatient Medications   Medication Instructions    amoxicillin-clavulanate (AUGMENTIN) 875-125 MG per tablet 1 tablet, Oral, 2 Times Daily    aspirin 81 MG EC tablet Take 1 tablet by mouth Daily.    atenolol (TENORMIN) 25 mg, Oral, Daily    cholecalciferol (VITAMIN D3) 25 MCG (1000 UT) tablet Take 1 tablet by mouth Every Night.    estradiol (ESTRACE) 1 mg, Oral, Daily    folic acid (FOLVITE) 1 MG tablet Take 1 tablet by mouth Every Night.    Magnesium Citrate 200 mg, Nightly    Misc Natural Products (Beet Root) 500 MG capsule 1 tablet, Nightly    Prenatal Vit-Fe Fumarate-FA (PRENATAL, CLASSIC, VITAMIN) 28-0.8 MG tablet tablet 1 tablet, Nightly    promethazine (PHENERGAN) 25 mg, Oral, Every 6 Hours PRN    rOPINIRole (REQUIP) 2 mg, Oral, Every Night at Bedtime    Turmeric 500 MG capsule 1 capsule, Daily       There are no discontinued medications.

## 2025-02-24 ENCOUNTER — OFFICE VISIT (OUTPATIENT)
Dept: SLEEP MEDICINE | Facility: HOSPITAL | Age: 75
End: 2025-02-24
Payer: MEDICARE

## 2025-02-24 VITALS
HEIGHT: 65 IN | OXYGEN SATURATION: 98 % | HEART RATE: 74 BPM | WEIGHT: 130.5 LBS | DIASTOLIC BLOOD PRESSURE: 77 MMHG | SYSTOLIC BLOOD PRESSURE: 113 MMHG | BODY MASS INDEX: 21.74 KG/M2

## 2025-02-24 DIAGNOSIS — G25.81 RESTLESS LEGS SYNDROME (RLS): ICD-10-CM

## 2025-02-24 DIAGNOSIS — D50.9 IRON DEFICIENCY ANEMIA, UNSPECIFIED IRON DEFICIENCY ANEMIA TYPE: ICD-10-CM

## 2025-02-24 DIAGNOSIS — G47.33 OSA (OBSTRUCTIVE SLEEP APNEA): Primary | ICD-10-CM

## 2025-02-24 PROCEDURE — G0463 HOSPITAL OUTPT CLINIC VISIT: HCPCS

## 2025-02-24 NOTE — PROGRESS NOTES
"  [facility]    SLEEP CLINIC FOLLOW UP PROGRESS NOTE.    Audrey Emerson  [id number]   1950  74 y.o.  female      PCP: Mathew Mehta DO      Date of visit: 2/24/2025    No chief complaint on file.      HPI:  This is a 74 y.o. years old patient is here for the management of obstructive sleep apnea.  Sleep apnea is moderate in severity with a AHI of 15/hr. She has history of renal cell carcinoma and had surgery in October 2024.   She has been having trouble with waking up earlier than she would like typically with then air hunger sensation and then she has difficulty falling back asleep after she wakes up.  She is using a nasal cushion mask with preference for this and has tried fullface mask and nasal pillows previously.  She is currently on ropinirole 2 mg nightly for RLS which does help her restless legs.   Patient Goes to bed at 930 to 10 PM and gets up at 5 to 6 AM. Wakes up 1-2 times per night usually related to air hunger sensation.    [place] Sleepiness Scale :Total score: 0     ESS: 0    PAP download data dates January 25, 2025 through February 23, 2025  Device: DreamStation auto CPAP  Mask type: Nasal cushion  Pressure : AutoPap 5 to 15 cm, 90th percentile pressure 7.1  % days used >4 hours: 90  Average usage days used: 6 hours 47 minutes  AHI: 2.8  Average time in large leak per day 6 seconds  DME supplier: [employer]    SOCIAL (habits pertaining to sleep medicine)  History tobacco use: None  History of alcohol use: 0 per week  Caffeine use: 1 to 2 cups of coffee per day  OB History    No obstetric history on file.         REVIEW OF SYSTEMS:   Pertaining positive symptoms are:  Awakening at night      PHYSICAL EXAMINATION:  CONSTITUTIONAL:  Vitals:    02/24/25 0700   BP: 113/77   Pulse: 74   SpO2: 98%   Weight: 59.2 kg (130 lb 8 oz)   Height: 165.1 cm (65\")    Body mass index is 21.72 kg/m².   RESP SYSTEM: No respiratory distress  CARDIOVASULAR: Regular rate  NEURO: Answers " questions appropriately         BMI is within normal parameters. No other follow-up for BMI required.      ASSESSMENT AND PLAN:  Obstructive sleep apnea ( G 47.33)  I have independently reviewed the PAP compliance data and discussed with the patient the download data and encouarged the patient to continue to use the device. The device is benefiting the patient and the device is medically necessary.  The patient is also instructed to get the supplies from the [employer] and and change them on a regular basis.  A prescription for supplies has been sent to the [employer] company.    I have ordered a new CPAP machine it has been over 5 years since she got her last machine which was a replacement for her recalled device. I have changed pressure to fixed pressure of 8cm H2O as her 90% Pressure is 7.1 and ordered new device at 8cm with EPR off as she has been awakening with an air hunger sensation. It does not appear that there is excessive leak.   For RLS I have ordered serum ferritin level. Continue requip at this time. Discussed impulse control disorders possibility and sleep attacks and orthostatic hypotension- if she continues to have RLS problems will consider switching to gabapentinoid.  Augmentation is also possible. Discussed oral iron if ferritin is less than 75.   Return for 31 to 90 days after PAP setup. . Patient's questions were answered.    I have spent 30 minutes today on this encounter before, during and after the visit interviewing the patient and formulating my assessment and plan.

## 2025-02-26 ENCOUNTER — LAB (OUTPATIENT)
Dept: LAB | Facility: HOSPITAL | Age: 75
End: 2025-02-26
Payer: MEDICARE

## 2025-02-26 DIAGNOSIS — D50.9 IRON DEFICIENCY ANEMIA, UNSPECIFIED IRON DEFICIENCY ANEMIA TYPE: ICD-10-CM

## 2025-02-26 DIAGNOSIS — G25.81 RESTLESS LEGS SYNDROME (RLS): ICD-10-CM

## 2025-02-26 LAB — FERRITIN SERPL-MCNC: 159 NG/ML (ref 13–150)

## 2025-02-26 PROCEDURE — 36415 COLL VENOUS BLD VENIPUNCTURE: CPT

## 2025-02-26 PROCEDURE — 82728 ASSAY OF FERRITIN: CPT

## 2025-02-27 ENCOUNTER — TELEPHONE (OUTPATIENT)
Dept: SLEEP MEDICINE | Facility: HOSPITAL | Age: 75
End: 2025-02-27
Payer: MEDICARE

## 2025-02-27 NOTE — TELEPHONE ENCOUNTER
----- Message from Aiden Patel sent at 2/27/2025  3:18 PM EST -----  Can you call patient and let her know that for RLS she does not need treatment with iron.

## 2025-02-27 NOTE — TELEPHONE ENCOUNTER
Contacted patient regarding recent labs. LVM with results and informed her that If she had any questions or concerns to please reach out to our office. Results are also viewable for patient to see in [employer], If interested.

## 2025-03-06 ENCOUNTER — TELEPHONE (OUTPATIENT)
Dept: SLEEP MEDICINE | Facility: HOSPITAL | Age: 75
End: 2025-03-06
Payer: MEDICARE

## 2025-03-06 NOTE — TELEPHONE ENCOUNTER
Message from Joana at [facility] that pt is not eligible for new CPAP machine until July 21, 2025.  They will keep order on hold until that time.   [Current] : current [TextBox_4] : 64F with anxiety, gerd, smoker\par \par started having lower right chest pain in september, no obvious cause.  Had MRI spine that showed peripheral right lower lung base abnormality.  Patient reports that right lower chest hurts, feels like something is inside her chest, worse if she bends over, feels better if upright.  No cough/sob/wheeze/fever/chills/weight loss.  Has a lot of stress at home due to a daugter with anxiety.  Smokes on and off due to the stress at home.  No prior pulm hx.

## 2025-03-17 ENCOUNTER — TELEPHONE (OUTPATIENT)
Dept: FAMILY MEDICINE CLINIC | Facility: CLINIC | Age: 75
End: 2025-03-17

## 2025-03-17 ENCOUNTER — OFFICE VISIT (OUTPATIENT)
Dept: FAMILY MEDICINE CLINIC | Facility: CLINIC | Age: 75
End: 2025-03-17
Payer: MEDICARE

## 2025-03-17 VITALS
WEIGHT: 132 LBS | SYSTOLIC BLOOD PRESSURE: 144 MMHG | HEIGHT: 65 IN | DIASTOLIC BLOOD PRESSURE: 81 MMHG | OXYGEN SATURATION: 100 % | BODY MASS INDEX: 21.99 KG/M2 | TEMPERATURE: 97.6 F | HEART RATE: 63 BPM

## 2025-03-17 DIAGNOSIS — H69.91 DYSFUNCTION OF RIGHT EUSTACHIAN TUBE: Primary | ICD-10-CM

## 2025-03-17 PROCEDURE — 99213 OFFICE O/P EST LOW 20 MIN: CPT | Performed by: FAMILY MEDICINE

## 2025-03-17 PROCEDURE — 1125F AMNT PAIN NOTED PAIN PRSNT: CPT | Performed by: FAMILY MEDICINE

## 2025-03-17 RX ORDER — FLUTICASONE PROPIONATE 50 MCG
2 SPRAY, SUSPENSION (ML) NASAL DAILY
Qty: 18.2 G | Refills: 1 | Status: SHIPPED | OUTPATIENT
Start: 2025-03-17

## 2025-03-17 RX ORDER — METHYLPREDNISOLONE 4 MG/1
TABLET ORAL
Qty: 21 TABLET | Refills: 0 | Status: SHIPPED | OUTPATIENT
Start: 2025-03-17

## 2025-03-17 NOTE — PROGRESS NOTES
Chief Complaint   Patient presents with    Earache        Subjective     Audrey Emerson  has a past medical history of Acromioclavicular separation, Ankle sprain, Arthritis, Arthritis of back (Teenager), Arthritis of neck, Bursitis of hip, Cervical disc disorder, Cholelithiasis (surgery 2007), Corns and callus, Dislocated elbow, Dislocation of finger, Dislocation, shoulder, Jhonny-Danlos disease, Foot pain, bilateral (12/17/2019), Fracture, finger, Fracture, foot, Frozen shoulder (1980+), Gastrointestinal ulcer (1970), Hip arthrosis (1980+), Knee sprain, Knee swelling (1960+), Low back strain, Lumbosacral disc disease, Mitral valve disorder, Neck strain, Osteoporosis, Periarthritis of shoulder, PONV (postoperative nausea and vomiting), Renal cell carcinoma of left kidney, Restless leg, Rotator cuff syndrome (1970-80), Seasonal allergies, Stress fracture, Tendinitis of knee, Tennis elbow, Uterine fibroid (1980), and Wrist sprain.    Earache-she has had on and off again right earache for the last 8 months.  She has had course of antibiotic and nasal steroids.  The antibiotics have seemed to help but then usually here pain comes back.  Most recently she was to the urgent care.  Although salt was some bulging and fluid and gave her some Flonase.  That has not seemed to help.  She states along with this she is felt lightheaded and has some pain that runs down her neck.  She denies any head congestion or postnasal drainage nor fever or chills.        PHQ-2 Depression Screening  Little interest or pleasure in doing things?     Feeling down, depressed, or hopeless?     PHQ-2 Total Score     PHQ-9 Depression Screening  Little interest or pleasure in doing things?     Feeling down, depressed, or hopeless?     Trouble falling or staying asleep, or sleeping too much?     Feeling tired or having little energy?     Poor appetite or overeating?     Feeling bad about yourself - or that you are a failure or have let yourself or your  family down?     Trouble concentrating on things, such as reading the newspaper or watching television?     Moving or speaking so slowly that other people could have noticed? Or the opposite - being so fidgety or restless that you have been moving around a lot more than usual?     Thoughts that you would be better off dead, or of hurting yourself in some way?     PHQ-9 Total Score     If you checked off any problems, how difficult have these problems made it for you to do your work, take care of things at home, or get along with other people?       Allergies   Allergen Reactions    Pentazocine Hallucinations       Prior to Admission medications    Medication Sig Start Date End Date Taking? Authorizing Provider   aspirin 81 MG EC tablet Take 1 tablet by mouth Daily.   Yes Dimas Lopez MD   atenolol (TENORMIN) 25 MG tablet Take 1 tablet by mouth Daily.  Patient taking differently: Take 1 tablet by mouth Every Night. 10/3/24  Yes Mathew Mehta DO   cholecalciferol (VITAMIN D3) 25 MCG (1000 UT) tablet Take 1 tablet by mouth Every Night.   Yes Dimas Lopez MD   estradiol (ESTRACE) 1 MG tablet Take 1 tablet by mouth Daily.  Patient taking differently: Take 1 tablet by mouth Every Night. 10/3/24  Yes Mathew Mehta DO   fluticasone (FLONASE) 50 MCG/ACT nasal spray Administer 2 sprays into the nostril(s) as directed by provider Daily. 3/7/25  Yes Ciarra Bob APRN   folic acid (FOLVITE) 1 MG tablet Take 1 tablet by mouth Every Night.   Yes Dimas Lopez MD   Magnesium Citrate 200 MG tablet Take 1 tablet by mouth Every Night.   Yes Dimas Lopez MD   Misc Natural Products (Beet Root) 500 MG capsule Take 1 tablet by mouth Every Night.   Yes Dimas Lopez MD   Prenatal Vit-Fe Fumarate-FA (PRENATAL, CLASSIC, VITAMIN) 28-0.8 MG tablet tablet Take 1 tablet by mouth Every Night.   Yes Dimas Lopez MD   promethazine (PHENERGAN) 25 MG tablet Take 1  tablet by mouth Every 6 (Six) Hours As Needed for Nausea or Vomiting. 10/3/24  Yes Mathew Mehta, DO   rOPINIRole (REQUIP) 1 MG tablet Take 2 tablets by mouth every night at bedtime. 2/3/25  Yes Mathew Mehta, DO   Turmeric 500 MG capsule Take 1 capsule by mouth Daily.   Yes Provider, MD Dimas        Patient Active Problem List   Diagnosis    Precordial chest pain    JAIDA on CPAP    Jhonny-Danlos disease    Osteoporosis    Restless leg    Arthritis    Encounter for annual physical exam    Encounter for screening mammogram for malignant neoplasm of breast    Acute non-recurrent maxillary sinusitis    Medicare annual wellness visit, subsequent    Corns and callus    Gastrointestinal ulcer    Pain of foot    Seasonal allergic rhinitis    Encounter for screening for malignant neoplasm of colon    Renal cell carcinoma of left kidney    Left renal mass    Hospital discharge follow-up    Dysfunction of right eustachian tube        Past Surgical History:   Procedure Laterality Date    BREAST LUMPECTOMY Bilateral     1984     SECTION      CHOLECYSTECTOMY      COLONOSCOPY  2003    COLONOSCOPY N/A 2023    Procedure: COLONOSCOPY;  Surgeon: Raul Laughlin MD;  Location: [facility] ENDOSCOPY;  Service: Gastroenterology;  Laterality: N/A;  DIVERTICULOSIS    HYSTERECTOMY      total    MYOMECTOMY  1980    NEPHRECTOMY PARTIAL Left 10/29/2024    Procedure: NEPHRECTOMY PARTIAL LAPAROSCOPIC WITH DAVINCI ROBOT  Removal of part of the kidney using cameras and the da Anneliese robot;  Surgeon: Sandra Sanders MD;  Location: [facility] MAIN OR;  Service: Robotics - DaVinci;  Laterality: Left;    ROTATOR CUFF REPAIR Bilateral     1996    SALPINGO OOPHORECTOMY      bso    TRIGGER POINT INJECTION         Social History     Socioeconomic History    Marital status:    Tobacco Use    Smoking status: Never     Passive exposure: Past    Smokeless tobacco: Never   Vaping Use  "   Vaping status: Never Used   Substance and Sexual Activity    Alcohol use: No    Drug use: Never    Sexual activity: Defer     Comment: on estradiol       Family History   Problem Relation Age of Onset    Heart disease Father     Colon polyps Father         at autopsy    Cancer Father         Myleofibrosis    Stroke Mother     Breast cancer Mother     Arthritis Mother     Osteoporosis Mother     Cancer Mother         Meningoma brain    Clotting disorder Mother         Jhonny Danlos    Collagen disease Mother         Johnny Danlos    Dislocations Mother     Scoliosis Mother     Severe sprains Mother     Cancer Sister         breast cancer x2    Arthritis Sister     Anesthesia problems Sister     Arthritis Brother     Heart disease Brother     Cancer Brother         Liver brain cancer    Liver cancer Brother     Broken bones Brother     Alcohol abuse Brother     Arthritis Daughter     Colon cancer Neg Hx        Family history, surgical history, past medical history, Allergies and meds reviewed with patient today and updated in [employer] EMR.     ROS:  Review of Systems   Constitutional:  Negative for chills and fever.   HENT:  Positive for ear pain. Negative for congestion, postnasal drip and rhinorrhea.        OBJECTIVE:  Vitals:    03/17/25 1547   BP: 144/81   BP Location: Left arm   Patient Position: Sitting   Pulse: 63   Temp: 97.6 °F (36.4 °C)   SpO2: 100%   Weight: 59.9 kg (132 lb)   Height: 165.1 cm (65\")     No results found.   Body mass index is 21.97 kg/m².  No LMP recorded. Patient has had a hysterectomy.    The 10-year ASCVD risk score (Chuck DK, et al., 2019) is: 15.6%    Values used to calculate the score:      Age: 74 years      Sex: Female      Is Non- : No      Diabetic: No      Tobacco smoker: No      Systolic Blood Pressure: 144 mmHg      Is BP treated: No      HDL Cholesterol: 61 mg/dL      Total Cholesterol: 170 mg/dL     Physical Exam  Vitals and nursing note reviewed. "   Constitutional:       General: She is not in acute distress.     Appearance: Normal appearance. She is normal weight.   HENT:      Head: Normocephalic.      Right Ear: Ear canal and external ear normal. Tympanic membrane is bulging.      Left Ear: Tympanic membrane, ear canal and external ear normal.      Nose: Nose normal.      Mouth/Throat:      Mouth: Mucous membranes are moist.      Pharynx: Oropharynx is clear.   Eyes:      General: No scleral icterus.     Conjunctiva/sclera: Conjunctivae normal.      Pupils: Pupils are equal, round, and reactive to light.   Cardiovascular:      Rate and Rhythm: Normal rate and regular rhythm.      Pulses: Normal pulses.      Heart sounds: Normal heart sounds. No murmur heard.  Pulmonary:      Effort: Pulmonary effort is normal.      Breath sounds: Normal breath sounds. No wheezing, rhonchi or rales.   Musculoskeletal:      Cervical back: Neck supple. No rigidity or tenderness.   Lymphadenopathy:      Cervical: No cervical adenopathy.   Skin:     General: Skin is warm and dry.      Coloration: Skin is not jaundiced.      Findings: No rash.   Neurological:      General: No focal deficit present.      Mental Status: She is alert and oriented to person, place, and time.   Psychiatric:         Mood and Affect: Mood normal.         Thought Content: Thought content normal.         Judgment: Judgment normal.         Procedures    Lab on 02/26/2025   Component Date Value Ref Range Status    Ferritin 02/26/2025 159.00 (H)  13.00 - 150.00 ng/mL Final   Office Visit on 02/20/2025   Component Date Value Ref Range Status    SARS Antigen 02/20/2025 Not Detected  Not Detected, Presumptive Negative Final    Influenza A Antigen CAPRI 02/20/2025 Not Detected  Not Detected Final    Influenza B Antigen CAPRI 02/20/2025 Not Detected  Not Detected Final    Internal Control 02/20/2025 Passed  Passed Final    Lot Number 02/20/2025 [id number]   Final    Expiration Date 02/20/2025 07/18/2025   Final        ASSESSMENT/ PLAN:    Diagnoses and all orders for this visit:    1. Dysfunction of right eustachian tube (Primary)  Assessment & Plan:  She has had persistent and recurrent right ear problems since past August.  Will give her a burst of steroids continue using the Flonase nasal spray.  Will also set her up to see ENT.    Orders:  -     fluticasone (FLONASE) 50 MCG/ACT nasal spray; Administer 2 sprays into the nostril(s) as directed by provider Daily.  Dispense: 18.2 g; Refill: 1  -     Ambulatory Referral to ENT (Otolaryngology)    Other orders  -     methylPREDNISolone (MEDROL) 4 MG dose pack; Take as directed on package instructions.  Dispense: 21 tablet; Refill: 0        BMI is within normal parameters. No other follow-up for BMI required.      Orders Placed Today:     New Medications Ordered This Visit   Medications    fluticasone (FLONASE) 50 MCG/ACT nasal spray     Sig: Administer 2 sprays into the nostril(s) as directed by provider Daily.     Dispense:  18.2 g     Refill:  1    methylPREDNISolone (MEDROL) 4 MG dose pack     Sig: Take as directed on package instructions.     Dispense:  21 tablet     Refill:  0        Management Plan:     An After Visit Summary was printed and given to the patient at discharge.    Follow-up: Return if symptoms worsen or fail to improve.    Mathew Mehta DO 3/17/2025 16:13 EDT  This note was electronically signed.

## 2025-03-17 NOTE — ASSESSMENT & PLAN NOTE
She has had persistent and recurrent right ear problems since past August.  Will give her a burst of steroids continue using the Flonase nasal spray.  Will also set her up to see ENT.

## 2025-03-17 NOTE — TELEPHONE ENCOUNTER
Caller:Audery Emerson   Relationship:SELF  Best call back number:[phone]   Who are you requesting to speak with (clinical staff, provider,  specific staff member): MA  What was the call regarding: PT CALLED TRYING TO GET IN FOR EAR PAIN/REFERRAL. THE EARLIER APPT AVAILABLE WAS 4/1. REFERRED TO URGENT CARE AND PT DENIED. PT WANTS A REFERRAL TO BE SEEN SOONER.     PLEASE ADVISE  
Pt notified   
Was seen in urgent care 3/7/25 and was seen by Dr Cisse 2/20/25 next office visit with you 4/1/2025.  
- continue hydralazine, amlodipine, and HCTZ

## 2025-03-26 DIAGNOSIS — C64.2 RENAL CELL CARCINOMA OF LEFT KIDNEY: Primary | ICD-10-CM

## 2025-04-14 ENCOUNTER — CLINICAL SUPPORT (OUTPATIENT)
Dept: GENETICS | Facility: HOSPITAL | Age: 75
End: 2025-04-14
Payer: MEDICARE

## 2025-04-14 ENCOUNTER — TELEPHONE (OUTPATIENT)
Dept: ONCOLOGY | Facility: HOSPITAL | Age: 75
End: 2025-04-14
Payer: MEDICARE

## 2025-04-14 DIAGNOSIS — C64.2 RENAL CELL CARCINOMA OF LEFT KIDNEY: ICD-10-CM

## 2025-04-14 DIAGNOSIS — Z80.42 FAMILY HISTORY OF PROSTATE CANCER: ICD-10-CM

## 2025-04-14 DIAGNOSIS — Z80.0 FAMILY HISTORY- STOMACH CANCER: ICD-10-CM

## 2025-04-14 DIAGNOSIS — Z80.3 FAMILY HISTORY OF BREAST CANCER: ICD-10-CM

## 2025-04-14 DIAGNOSIS — Z80.51 FAMILY HISTORY OF KIDNEY CANCER: ICD-10-CM

## 2025-04-14 DIAGNOSIS — Z13.79 GENETIC TESTING: Primary | ICD-10-CM

## 2025-04-14 NOTE — PROGRESS NOTES
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 Genetic counseling was provided via telephone. Ms. Emerson confirmed her name, date of birth, and that she was in [place] at the time of her appointment. She was diagnosed with a left-sided kidney cancer at age 74. She had a partial nephrectomy and doesn't anticipate needing additional treatment at this time. She has no history of collapsed lung or the skin manifestations associated with BHDS. She was 13 years old at menarche and had her first child at age 35. She had a total hysterectomy at age 41 due to abnormal. She's been estradiol for about 15 years. Ms. Emerson's most recent mammogram was in December 2024 and showed scattered areas of fibroglandular breast tissue but was otherwise normal. She reports a history of normal bilateral biopsies. She had a colonoscopy in 2023 and reports a history of no polyps. She was interested in discussing her risk for a hereditary cancer syndrome. Ms. Emerson decided to pursue comprehensive genetic testing to evaluate her risk of cancer, therefore the CancerNext Expanded Panel was ordered through [employer] which analyzes 76 genes associated with an increased cancer risk. Results are expected in 2-3 weeks once the sample has been received.     PERTINENT FAMILY HISTORY: (See attached pedigree)   Sister:   Breast cancer, 40s & 50s  Brother:  Benign brain tumor  Mother:   Meningioma  Mat Uncle:  Stomach cancer     Prostate cancer  Mat Uncle x2:  Kidney cancer  Mat Uncle x3:  Stomach cancer  Pat Uncle:  Lung cancer  Pat Grandfather: Stomach cancer    We do not have medical records regarding any of these diagnoses.       RISK ASSESSMENT:  Ms. Emerson's family history of breast cancer raises the question of a hereditary cancer syndrome. NCCN guidelines for genetic testing for BRCA1/2 states that individuals with a close relative with  breast cancer under the age of 50 may consider genetic testing. With Ms. Emerson's sister's diagnosis being in her 40s, she would clearly meet this criteria. This risk assessment is based on the family history information provided at the time of the appointment. The assessment could change in the future should new information be obtained.    GENETIC COUNSELING:  We reviewed the family history information in detail. Cases of cancer follow three general patterns: sporadic, familial, and hereditary. While most cancer is sporadic, some cases appear to occur in family clusters. These cases are said to be familial and account for 10-20% of cancer cases. Familial cases may be due to a combination of shared genes and environmental factors among family members. In even fewer cases, the risk for cancer is inherited, and the genes responsible for the increased cancer risk are known.       Family histories typical of hereditary cancer syndromes usually include multiple first- and second-degree relatives diagnosed with cancer types that define a syndrome. These cases tend to be diagnosed at younger-than-expected ages and can be bilateral or multifocal. The cancer in these families follows an autosomal dominant inheritance pattern, which indicates the likely presence of a mutation in a cancer susceptibility gene. Children and siblings of an individual believed to carry this mutation have a 50% chance of inheriting that mutation, thereby inheriting the increased risk to develop cancer. These mutations can be passed down from the maternal or the paternal lineage.    We discussed that BHDS is an autosomal dominant disorder caused by mutations in the FLCN gene. Children and siblings of an individual who has a mutation have a 50% chance of inheriting that mutation. The clinical characteristics of BHDS include skin manifestations (fibrofolliculomas, trichodiscomas/angiofibromas, perifollicular fibromas, and acrochordons), pulmonary  (lung) cysts, spontaneous pneumothorax, and various types of renal tumors. BHDS can present very differently in individuals, even within the same family. Skin lesions typically appear during the third and fourth decades of life and tend to increase in size and number with age. Multiple lung cysts are seen in approximately 70-85% of individuals with BHDS and are commonly bilateral and multifocal. While most individuals are asymptomatic, they are at risk for spontaneous pneumothorax. Approximately 24-48% of individuals with BHDS experience spontaneous pneumothorax. A spontaneous pneumothorax is a condition in which air accumulates between the lungs and chest cavity, and this can lead to a partial or complete collapse of the lungs. Individuals with BHDS have an increased risk of renal (kidney) tumors that tend to be bilateral, multifocal, and are typically slow growing. Studies have shown that individuals with BHDS are at increased risk of renal cell tumors with varying malignancy potential, and the average age of tumor diagnosis is 48 years of age.    Due to Ms. Emerson's family history of breast cancer, we discussed hereditary breast cancer. Hereditary breast cancer accounts for 5-10% of all cases of breast cancer. A significant proportion (50%) of hereditary breast cancer can be attributed to mutations in the BRCA1 and BRCA2 genes. Mutations in these genes confer an increased risk for breast cancer, ovarian cancer, male breast cancer, prostate cancer, and pancreatic cancer. Women with a BRCA1 or BRCA2 mutation have over a 60% lifetime risk of breast cancer and up to a 58% risk of ovarian cancer. There are other clinically significant breast cancer related genes in addition to BRCA1/2, including PALB2, LUISITO, and CHEK2.     There are other hereditary cancer syndromes as well. Based on Ms. Emerson's family history and her desire to get more information regarding her personal risks, testing was pursued through a multigene  panel evaluating several other genes known to increase the risk for cancer.    GENETIC TESTING:  The risks, benefits, and limitations of genetic testing and implications for clinical management following testing were reviewed. DNA test results can influence decisions regarding screening and prevention. Genetic testing can have significant psychological implications for both individuals and families. Also discussed was the possibility of employment and insurance discrimination based on genetic test results and the laws in place to prevent this, as well as the limitations of these laws.       We discussed panel testing, which would involve testing 76 genes associated with increased cancer risk. The implications of a positive or negative test result were discussed. We also discussed the importance of testing an affected relative and how a negative result for Ms. Emerson wouldn't necessarily mean the cancers presenting in her family weren't due to a genetic mutation that Ms. Emerson did not inherit. In general, a negative genetic test result is most informative if a mutation has first been established in an affected member of the family. In cases where an affected individual is not available or interested in testing, it is appropriate to offer testing to an unaffected individual. We discussed the possibility that, in some cases, genetic test results may be ambiguous due to the identification of a genetic variant. These variants may or may not be associated with an increased cancer risk. With multigene panel testing, it is not uncommon for a variant of uncertain significance (VUS) to be identified. If a VUS is identified, testing family members is typically not recommended and screening recommendations are made based on the family history. The laboratories that perform genetic testing work to reclassify the VUS and send out an amended report if and when a VUS is reclassified. The majority of variant findings are ultimately  reclassified to a negative result. Given Ms. Emerson's family history, a negative test result does not eliminate all cancer risk, although the risk would not be as high as it would with positive genetic testing.     Total time spent caring for the patient today was 45 minutes. This time includes chart review, time spent during the visit, and time spent after the visit on documentation and follow-up.    PLAN: Genetic testing was ordered via the CancerNext Expanded Panel through [facility]. We will schedule her blood draw at [facility]. Results are expected in 2-3 weeks once the sample has been received and will be called out to Ms. Emerson. If she has any questions in the meantime, she is welcome to call me at [phone].      Kim Walter MS, [facility] [place] [facility]  Licensed Certified Genetic Counselor

## 2025-04-14 NOTE — TELEPHONE ENCOUNTER
I CALLED TO SCHEDULE PT FOR GENETIC TESTING APPT. NO ANSWER SO I LEFT A MESSAGE TO CALL ME BACK TO SCHEDULE. THANKS

## 2025-04-16 ENCOUNTER — LAB (OUTPATIENT)
Dept: ONCOLOGY | Facility: HOSPITAL | Age: 75
End: 2025-04-16
Payer: MEDICARE

## 2025-04-16 DIAGNOSIS — D64.9 ANEMIA, UNSPECIFIED TYPE: Primary | ICD-10-CM

## 2025-04-24 ENCOUNTER — OFFICE VISIT (OUTPATIENT)
Dept: OTOLARYNGOLOGY | Facility: CLINIC | Age: 75
End: 2025-04-24
Payer: MEDICARE

## 2025-04-24 ENCOUNTER — OFFICE VISIT (OUTPATIENT)
Dept: SLEEP MEDICINE | Facility: HOSPITAL | Age: 75
End: 2025-04-24
Payer: MEDICARE

## 2025-04-24 VITALS
BODY MASS INDEX: 22.02 KG/M2 | SYSTOLIC BLOOD PRESSURE: 121 MMHG | HEART RATE: 58 BPM | WEIGHT: 132.2 LBS | HEIGHT: 65 IN | OXYGEN SATURATION: 98 % | DIASTOLIC BLOOD PRESSURE: 61 MMHG

## 2025-04-24 VITALS
DIASTOLIC BLOOD PRESSURE: 75 MMHG | BODY MASS INDEX: 22 KG/M2 | TEMPERATURE: 97.4 F | HEIGHT: 65 IN | SYSTOLIC BLOOD PRESSURE: 119 MMHG | HEART RATE: 51 BPM

## 2025-04-24 DIAGNOSIS — Q79.60 EHLERS-DANLOS DISEASE: ICD-10-CM

## 2025-04-24 DIAGNOSIS — H61.23 HEARING LOSS DUE TO CERUMEN IMPACTION, BILATERAL: Primary | ICD-10-CM

## 2025-04-24 DIAGNOSIS — H69.91 DYSFUNCTION OF RIGHT EUSTACHIAN TUBE: ICD-10-CM

## 2025-04-24 DIAGNOSIS — M19.90 ARTHRITIS: ICD-10-CM

## 2025-04-24 DIAGNOSIS — G47.33 OSA (OBSTRUCTIVE SLEEP APNEA): Primary | ICD-10-CM

## 2025-04-24 DIAGNOSIS — M26.629 TMJ SYNDROME: ICD-10-CM

## 2025-04-24 PROCEDURE — G0463 HOSPITAL OUTPT CLINIC VISIT: HCPCS

## 2025-04-24 PROCEDURE — 69210 REMOVE IMPACTED EAR WAX UNI: CPT | Performed by: OTOLARYNGOLOGY

## 2025-04-24 PROCEDURE — 99213 OFFICE O/P EST LOW 20 MIN: CPT | Performed by: STUDENT IN AN ORGANIZED HEALTH CARE EDUCATION/TRAINING PROGRAM

## 2025-04-24 PROCEDURE — 99204 OFFICE O/P NEW MOD 45 MIN: CPT | Performed by: OTOLARYNGOLOGY

## 2025-04-24 PROCEDURE — 1159F MED LIST DOCD IN RCRD: CPT | Performed by: STUDENT IN AN ORGANIZED HEALTH CARE EDUCATION/TRAINING PROGRAM

## 2025-04-24 PROCEDURE — 1160F RVW MEDS BY RX/DR IN RCRD: CPT | Performed by: STUDENT IN AN ORGANIZED HEALTH CARE EDUCATION/TRAINING PROGRAM

## 2025-04-24 NOTE — PROGRESS NOTES
[facility]    SLEEP CLINIC FOLLOW UP PROGRESS NOTE.    Audrey Emerson  [id number]   1950  74 y.o.  female      PCP: Mathew Mehta DO    Date of visit: 4/24/2025    No chief complaint on file.      HPI:  This is a 74 y.o. years old patient is here for the management of obstructive sleep apnea.    Sleep apnea is moderate in severity with a AHI of 15/hr. She has history of renal cell carcinoma and had surgery in October 2024.   Last visit She was having trouble with waking up earlier than she would like typically with then air hunger sensation and then she has difficulty falling back asleep after she wakes up.  Pressure was adjusted to fixed pressure of 8 as her 90% pressure was 7.1. Her AHI is higher today and she continues to have issues with mask displacement with her current headgear.  She is using a nasal mask with preference for this and has tried fullface mask and nasal pillows previously.  She believes her current headgear frame is too large for her.  She has tried to talk to her DME company and has not been successful in getting this resolved. She wakes up during the night and has some trouble falling back asleep and she has to readjust her headgear at that time.  She is currently on ropinirole 2 mg nightly for RLS which does help her restless legs.  Ferritin was checked in February of this year and was 159.  She goes to bed between 930 and 10 PM and gets up between 5 and 6 AM.  She wakes up 4-5 times per night and has to adjust her headgear frame.  She denies sleepiness during the day.    ESS: 0    PAP download data dates January 24, 2025 through April 23, 2025  Device: DreamStation auto CPAP  Mask type: Nasal  Pressure : 8 cm  % days used >4 hours: 91  Average usage days used: 7 hours 3 minutes  AHI: 5.7  Average unintentional leak: 8.6 L/min  DME supplier: currently [employer], switch to [employer]     SOCIAL (habits pertaining to sleep medicine)  History tobacco use:  "None  History of alcohol use: None per week  Caffeine use: 2 drinks per day  OB History    No obstetric history on file.         REVIEW OF SYSTEMS:   Pertaining positive symptoms are:  Nasal congestion, post nasal drip, dry mouth      PHYSICAL EXAMINATION:  CONSTITUTIONAL:  Vitals:    04/24/25 0700   BP: 121/61   Pulse: 58   SpO2: 98%   Weight: 60 kg (132 lb 3.2 oz)   Height: 165.1 cm (65\")    Body mass index is 22 kg/m².   RESP SYSTEM: No respiratory distress  CARDIOVASULAR: Regular rate  NEURO: Answers questions appropriately       BMI is within normal parameters. No other follow-up for BMI required.    ASSESSMENT AND PLAN:  Diagnoses and all orders for this visit:    1. JAIDA (obstructive sleep apnea) (Primary)  -     PAP Therapy      Discussed switching DME companies. She is agreeable to switch to different DME company due to difficulties. Order sent for in person visit for headgear selection. Recommended headgear with double straps to better keep mask in place. She is to call our office if they do not find one that works for her so we can check her for proper headgear.   AHI is elevated at 5.7 and so I have changed pressure to 7-14cm H2O.  Last visit a new CPAP machine was ordered however she was told she is not eligible until June 2025.  Return in about 2 months (around 6/24/2025). . Patient's questions were answered.      Aiden Patel, DO                "

## 2025-04-24 NOTE — PATIENT INSTRUCTIONS
1.  Patient has history of Erler Danlos syndrome.  She also had right otalgia diagnosed with TMJ syndrome 8 years ago after CT of IACs including TM joint.  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  Since then patient has been keeping up with all the instructions not to chew gum or jerky etc. and avoid clenching jaw and swallowing.  Patient has a bite guard made by dentist and she wears that every night along with CPAP machine for sleep apnea.  2.  On today's examination her TM joint is not bad in terms of tenderness or crepitus which is now present.  Her joint is not asymmetrical.  3.  Her pain is localized to the retromandibular portion and anterior to the SCM.  However there is no masses on palpation and no other adenopathy or neck findings were noted.  4.  I think patient's chronic right ear pain is probably most likely due to her TM joint and probably does not really have any ear etiology.  5.  I really do not have much to offer Ms. Emerson at this time but to prevent from getting this temporomandibular joint degeneration from getting worse.  6.  She can okay occasionally using anti-inflammatory medication but she must to utilize it's around-the-clock for a week to 2-week intervals at a time with food.  And if it is indeed Motrin she needs to take either 600 mg or 800 mg per dose.  Patient is going to check get out with Dr. Sanders regarding her renal function and taking anti-inflammatory medication.

## 2025-04-24 NOTE — PROGRESS NOTES
Patient Name: Audrey Emerson   Visit Date: 04/24/2025   Patient ID: [id number]  Provider: Baldomero Dodson MD    Sex: female  Location: [facility]   YOB: 1950  Location Address: [address] [place] [address], [address] [place] [address],?[place]?[id number]    Primary Care Provider Mathew Mehta,   Location Phone: [phone]    Referring Provider: Mathew Mehta,*        Chief Complaint  Establish Care and Otitis Media    History of Present Illness  Audrey Emerson is a 74 y.o. female who presents to [facility] for Establish Care and Otitis Media.   Patient was seen at [facility] clinic in 2017 for complaints of right otalgia.  She had extensive workup including close examination was flexible fiberoptic nasolaryngoscopy as well as CT scan of the neck and CT scan of IACs.  Basically final outcome was that she has bilateral TM joint syndrome which was causing patient's right otalgia.  CT scan identified bilateral degenerative joint disease of the TM joint.  Patient reports that she has had right ear pain back in October and they tried to clean her ear cerumen impaction out with irrigation but she still continued to have the pain.  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  Margins were clear and she is on a surveillance right now with no recurrent disease so far.  She also has history of Erler Danlos syndrome which causes her a lot of joint problems.  Past Medical History:   Diagnosis Date    Acromioclavicular separation     Allergic     Seasonal    Allergic rhinitis     Ankle sprain     Arrhythmia     Arthritis     Arthritis of back Teenager    Erhlers Danlos    Arthritis of neck     Bursitis of hip     Cervical disc disorder     Cholelithiasis surgery 2007    Corns and callus     Dental disease     Dislocated elbow     Dislocation of finger     Dislocation, shoulder     Dizziness 6/24    Ear wax removal per NP     Jhonny-Danlos disease     Foot pain, bilateral 2019    Fracture, finger     Fracture, foot     Frozen shoulder +    Gastrointestinal ulcer 1970    Headache     Migraine less than 1/month    Heart murmur     Hip arthrosis +    Knee sprain     Knee swelling +    Low back strain     Lumbosacral disc disease     Mitral valve disorder     mitral valve prolapse, follows with PCP, no current issues    Mitral valve prolapse     Neck strain     Osteopenia     Osteoporosis     Periarthritis of shoulder     PONV (postoperative nausea and vomiting)     Pulmonary embolism     Renal cell carcinoma of left kidney     Restless leg     Rotator cuff syndrome     Seasonal allergies     Sleep apnea     Use c-pap    Stress fracture     Tendinitis of knee     Tennis elbow     Uterine fibroid     Wrist sprain        Past Surgical History:   Procedure Laterality Date    BREAST LUMPECTOMY Bilateral     1984    BRONCHOSCOPY       SECTION      CHOLECYSTECTOMY      COLONOSCOPY  2003    COLONOSCOPY N/A 2023    Procedure: COLONOSCOPY;  Surgeon: Raul Laughlin MD;  Location: [facility] ENDOSCOPY;  Service: Gastroenterology;  Laterality: N/A;  DIVERTICULOSIS    HYSTERECTOMY      total    MYOMECTOMY  1980    NEPHRECTOMY PARTIAL Left 10/29/2024    Procedure: NEPHRECTOMY PARTIAL LAPAROSCOPIC WITH DAVINCI ROBOT  Removal of part of the kidney using cameras and the da Anneliese robot;  Surgeon: Sandra Sanders MD;  Location: [facility] MAIN OR;  Service: Robotics - DaVinci;  Laterality: Left;    ROTATOR CUFF REPAIR Bilateral     1996    SALPINGO OOPHORECTOMY      bso    SHOULDER SURGERY      TOTAL ABDOMINAL HYSTERECTOMY WITH SALPINGO OOPHORECTOMY      TRIGGER POINT INJECTION           Current Outpatient Medications:     aspirin 81 MG EC tablet, Take 1 tablet by mouth Daily., Disp: , Rfl:     atenolol (TENORMIN) 25 MG tablet, Take 1 tablet by mouth Daily. (Patient taking  "differently: Take 1 tablet by mouth Every Night.), Disp: 90 tablet, Rfl: 3    cholecalciferol (VITAMIN D3) 25 MCG (1000 UT) tablet, Take 1 tablet by mouth Every Night., Disp: , Rfl:     estradiol (ESTRACE) 1 MG tablet, Take 1 tablet by mouth Daily. (Patient taking differently: Take 1 tablet by mouth Every Night.), Disp: 90 tablet, Rfl: 3    fluticasone (FLONASE) 50 MCG/ACT nasal spray, Administer 2 sprays into the nostril(s) as directed by provider Daily., Disp: 18.2 g, Rfl: 1    folic acid (FOLVITE) 1 MG tablet, Take 1 tablet by mouth Every Night., Disp: , Rfl:     Magnesium Citrate 200 MG tablet, Take 1 tablet by mouth Every Night., Disp: , Rfl:     methylPREDNISolone (MEDROL) 4 MG dose pack, Take as directed on package instructions., Disp: 21 tablet, Rfl: 0    Misc Natural Products (Beet Root) 500 MG capsule, Take 1 tablet by mouth Every Night., Disp: , Rfl:     Prenatal Vit-Fe Fumarate-FA (PRENATAL, CLASSIC, VITAMIN) 28-0.8 MG tablet tablet, Take 1 tablet by mouth Every Night., Disp: , Rfl:     promethazine (PHENERGAN) 25 MG tablet, Take 1 tablet by mouth Every 6 (Six) Hours As Needed for Nausea or Vomiting., Disp: 15 tablet, Rfl: 1    rOPINIRole (REQUIP) 1 MG tablet, Take 2 tablets by mouth every night at bedtime., Disp: 90 tablet, Rfl: 3    Turmeric 500 MG capsule, Take 1 capsule by mouth Daily., Disp: , Rfl:      Allergies   Allergen Reactions    Pentazocine Hallucinations       Social History     Tobacco Use    Smoking status: Never     Passive exposure: Past    Smokeless tobacco: Never   Vaping Use    Vaping status: Never Used   Substance Use Topics    Alcohol use: No    Drug use: Never        Objective     Vital Signs:   Vitals:    04/24/25 1025   BP: 119/75   Pulse: 51   Temp: 97.4 °F (36.3 °C)   Height: 165.1 cm (65\")       Tobacco Use: Low Risk  (4/24/2025)    Patient History     Smoking Tobacco Use: Never     Smokeless Tobacco Use: Never     Passive Exposure: Past         Physical " Exam    Constitutional   Appearance  well developed, well-nourished, alert and in no acute distress, voice clear and strong    Head   Inspection  no deformities or lesions      Face   Inspection  no facial lesions; House-Brackmann I/VI bilaterally   Palpation  no TMJ crepitus nor  muscle tenderness bilaterally     Eyes/Vision   Visual Fields  extraocular movements are intact, no spontaneous or gaze-induced nystagmus  Conjunctivae  clear   Sclerae  clear   Pupils and Irises  pupils equal, round, and reactive to light.   Nystagmus  not present     Ears, Nose, Mouth and Throat  Ears  External Ears  appearance within normal limits, no lesions present   Otoscopic Examination  tympanic membrane appearance within normal limits bilaterally without perforations, well-aerated middle ears   Hearing  intact to conversational voice both ears   Tunning fork testing    Rinne:  Drake:    Nose  External Nose  appearance normal   Intranasal Exam  mucosa within normal limits, vestibules normal, no intranasal lesions present, septum midline, sinuses non tender to percussion   Modified Travis Test:    Oral Cavity  Oral Mucosa  oral mucosa normal without pallor or cyanosis   Lips  lip appearance normal   Teeth  normal dentition for age   Gums  gums pink, non-swollen, no bleeding present   Tongue  tongue appearance normal; normal mobility   Palate  hard palate normal, soft palate appearance normal with symmetric mobility     Throat  Oropharynx  no inflammation or lesions present, tonsils within normal limits   Hypopharynx  appearance within normal limits   Larynx  voice normal     Neck  Inspection/Palpation  normal appearance, no masses or tenderness, trachea midline; thyroid size normal, nontender, no nodules or masses present on palpation     Lymphatic  Neck  no lymphadenopathy present   Supraclavicular Nodes  no lymphadenopathy present   Preauricular Nodes  no lymphadenopathy present     Respiratory  Respiratory  Effort  breathing unlabored   Inspection of Chest  normal appearance, no retractions     Musculoskeletal   Cervical back: Normal range of motion and neck supple.      Skin and Subcutaneous Tissue  General Inspection  Regarding face and neck - there are no rashes present, no lesions present, and no areas of discoloration     Neurologic  Cranial Nerves  cranial nerves II-XII are grossly intact bilaterally   Gait and Station  normal gait, able to stand without diffculty    Psychiatric  Judgement and Insight  judgment and insight intact   Mood and Affect  mood normal, affect appropriate       RESULTS REVIEWED    I have reviewed the following information:   [x]  Previous Internal Note  []  Previous External Note:   [x]  Ordered Tests & Results:      Pathology:   Lab Results   Component Value Date    Microscopic Description  10/29/2024     Microscopic examination performed.         TSH   Date Value Ref Range Status   10/01/2024 2.200 0.270 - 4.200 uIU/mL Final     Calcium   Date Value Ref Range Status   11/21/2024 9.8 8.6 - 10.5 mg/dL Final     25 Hydroxy, Vitamin D   Date Value Ref Range Status   10/01/2024 79.8 30.0 - 100.0 ng/ml Final       No Images in the past 120 days found..    I have discussed the interpretation of the above results with the patient.    Ear Cerumen Removal    Date/Time: 4/24/2025 11:12 AM    Performed by: Baldomero Dodson MD  Authorized by: Baldomero Dodson MD    Anesthesia:  Local Anesthetic: none  Location details: left ear and right ear  Patient tolerance: patient tolerated the procedure well with no immediate complications  Comments: Under microscope, both ear canal was examined and cleaned free of any residual epithelial debris or cerumen impaction.  Otherwise both TMs are quite normal and no other findings were noted.  Procedure type: instrumentation, alligator forceps   Sedation:  Patient sedated: no                  Assessment and Plan   Diagnoses and all orders for this visit:    1. Hearing  loss due to cerumen impaction, bilateral (Primary)    2. Dysfunction of right eustachian tube    3. TMJ syndrome    4. Jhonny-Danlos disease    5. Arthritis    Other orders  -     Ear Cerumen Removal        (H61.23) Hearing loss due to cerumen impaction, bilateral    (H69.91) Dysfunction of right eustachian tube    (M26.629) TMJ syndrome    (Q79.60) Jhonny-Danlos disease    (M19.90) Arthritis     Audrey Emerson  reports that she has never smoked. She has been exposed to tobacco smoke. She has never used smokeless tobacco.         Plan:  Patient Instructions   1.  Patient has history of Erler Danlos syndrome.  She also had right otalgia diagnosed with TMJ syndrome 8 years ago after CT of IACs including TM joint.  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  Since then patient has been keeping up with all the instructions not to chew gum or jerky etc. and avoid clenching jaw and swallowing.  Patient has a bite guard made by dentist and she wears that every night along with CPAP machine for sleep apnea.  2.  On today's examination her TM joint is not bad in terms of tenderness or crepitus which is now present.  Her joint is not asymmetrical.  3.  Her pain is localized to the retromandibular portion and anterior to the SCM.  However there is no masses on palpation and no other adenopathy or neck findings were noted.  4.  I think patient's chronic right ear pain is probably most likely due to her TM joint and probably does not really have any ear etiology.  5.  I really do not have much to offer Ms. Emerson at this time but to prevent from getting this temporomandibular joint degeneration from getting worse.  6.  She can okay occasionally using anti-inflammatory medication but she must to utilize it's around-the-clock for a week to 2-week intervals at a time with food.  And if it is indeed Motrin she needs to take either 600 mg or 800 mg per dose.  Patient is going to check  get out with Dr. Sanders regarding her renal function and taking anti-inflammatory medication.        Follow Up   Return if symptoms worsen or fail to improve.  Patient was given instructions and counseling regarding her condition or for health maintenance advice. Please see specific information pulled into the AVS if appropriate.

## 2025-04-28 ENCOUNTER — DOCUMENTATION (OUTPATIENT)
Dept: GENETICS | Facility: HOSPITAL | Age: 75
End: 2025-04-28
Payer: MEDICARE

## 2025-04-28 ENCOUNTER — HOSPITAL ENCOUNTER (OUTPATIENT)
Dept: CT IMAGING | Facility: HOSPITAL | Age: 75
Discharge: HOME | End: 2025-04-28
Admitting: UROLOGY
Payer: MEDICARE

## 2025-04-28 ENCOUNTER — TELEPHONE (OUTPATIENT)
Dept: GENETICS | Facility: HOSPITAL | Age: 75
End: 2025-04-28
Payer: MEDICARE

## 2025-04-28 DIAGNOSIS — C64.2 RENAL CELL CARCINOMA OF LEFT KIDNEY: ICD-10-CM

## 2025-04-28 LAB
CREAT BLDA-MCNC: 1 MG/DL (ref 0.6–1.3)
EGFRCR SERPLBLD CKD-EPI 2021: 59.6 ML/MIN/1.73

## 2025-04-28 PROCEDURE — 74178 CT ABD&PLV WO CNTR FLWD CNTR: CPT

## 2025-04-28 PROCEDURE — 25510000001 IOPAMIDOL PER 1 ML: Performed by: UROLOGY

## 2025-04-28 PROCEDURE — 82565 ASSAY OF CREATININE: CPT

## 2025-04-28 RX ORDER — IOPAMIDOL 755 MG/ML
100 INJECTION, SOLUTION INTRAVASCULAR
Status: COMPLETED | OUTPATIENT
Start: 2025-04-28 | End: 2025-04-28

## 2025-04-28 RX ADMIN — IOPAMIDOL 100 ML: 755 INJECTION, SOLUTION INTRAVENOUS at 08:34

## 2025-04-28 NOTE — PROGRESS NOTES
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 Genetic counseling was provided via telephone. Ms. Emerson confirmed her name, date of birth, and that she was in [place] at the time of her appointment. She was diagnosed with a left-sided kidney cancer at age 74. She had a partial nephrectomy and doesn't anticipate needing additional treatment at this time. She has no history of collapsed lung or the skin manifestations associated with BHDS. She was 13 years old at menarche and had her first child at age 35. She had a total hysterectomy at age 41 due to abnormal. She's been estradiol for about 15 years. Ms. Emerson's most recent mammogram was in December 2024 and showed scattered areas of fibroglandular breast tissue but was otherwise normal. She reports a history of normal bilateral biopsies. She had a colonoscopy in 2023 and reports a history of no polyps. She was interested in discussing her risk for a hereditary cancer syndrome. Ms. Emerson decided to pursue comprehensive genetic testing to evaluate her risk of cancer, therefore the Cancer[employer] Expanded Panel was ordered through [employer] which analyzes 76 genes associated with an increased cancer risk. Genetic testing was negative for pathogenic mutations in BRCA1/2 and 74 additional genes on this panel. These normal results were discussed with Ms. Emerson by telephone on 4/28/25.     PERTINENT FAMILY HISTORY: (See attached pedigree)   Sister:   Breast cancer, 40s & 50s  Brother:  Benign brain tumor  Mother:   Meningioma  Mat Uncle:  Stomach cancer     Prostate cancer  Mat Uncle x2:  Kidney cancer  Mat Uncle x3:  Stomach cancer  Pat Uncle:  Lung cancer  Pat Grandfather: Stomach cancer    We do not have medical records regarding any of these diagnoses.       RISK ASSESSMENT:  Ms. Emerson's family history of breast cancer raises the question of a hereditary cancer  syndrome. NCCN guidelines for genetic testing for BRCA1/2 states that individuals with a close relative with breast cancer under the age of 50 may consider genetic testing. With Ms. Emerson's sister's diagnosis being in her 40s, she would clearly meet this criteria. This risk assessment is based on the family history information provided at the time of the appointment. The assessment could change in the future should new information be obtained.    At this time, Ms. Duvals management should be guided by a family history-based risk assessment. Sensbeater-Passlogixzick, version 8 is able to take into account personal factors (age at menarche, age at first live birth, breast density, etc) and family history when calculating risk for breast cancer. Computer modeling estimates that Ms. Duvals lifetime personal risk for developing breast cancer is up to 7.7% (Haier-Raisazick, v8), compared to the average female's risk of 12.5%. In general, a lifetime risk above 20% is considered to be “high risk” where increased screening is warranted; Ms. Caro risk does not fall into that category. This risk assessment is based on the family history information provided at the time of the appointment and could change in the future should new information be obtained.    GENETIC COUNSELING:  We reviewed the family history information in detail. Cases of cancer follow three general patterns: sporadic, familial, and hereditary. While most cancer is sporadic, some cases appear to occur in family clusters. These cases are said to be familial and account for 10-20% of cancer cases. Familial cases may be due to a combination of shared genes and environmental factors among family members. In even fewer cases, the risk for cancer is inherited, and the genes responsible for the increased cancer risk are known.       Family histories typical of hereditary cancer syndromes usually include multiple first- and second-degree relatives diagnosed with cancer  types that define a syndrome. These cases tend to be diagnosed at younger-than-expected ages and can be bilateral or multifocal. The cancer in these families follows an autosomal dominant inheritance pattern, which indicates the likely presence of a mutation in a cancer susceptibility gene. Children and siblings of an individual believed to carry this mutation have a 50% chance of inheriting that mutation, thereby inheriting the increased risk to develop cancer. These mutations can be passed down from the maternal or the paternal lineage.    We discussed that BHDS is an autosomal dominant disorder caused by mutations in the FLCN gene. Children and siblings of an individual who has a mutation have a 50% chance of inheriting that mutation. The clinical characteristics of BHDS include skin manifestations (fibrofolliculomas, trichodiscomas/angiofibromas, perifollicular fibromas, and acrochordons), pulmonary (lung) cysts, spontaneous pneumothorax, and various types of renal tumors. BHDS can present very differently in individuals, even within the same family. Skin lesions typically appear during the third and fourth decades of life and tend to increase in size and number with age. Multiple lung cysts are seen in approximately 70-85% of individuals with BHDS and are commonly bilateral and multifocal. While most individuals are asymptomatic, they are at risk for spontaneous pneumothorax. Approximately 24-48% of individuals with BHDS experience spontaneous pneumothorax. A spontaneous pneumothorax is a condition in which air accumulates between the lungs and chest cavity, and this can lead to a partial or complete collapse of the lungs. Individuals with BHDS have an increased risk of renal (kidney) tumors that tend to be bilateral, multifocal, and are typically slow growing. Studies have shown that individuals with BHDS are at increased risk of renal cell tumors with varying malignancy potential, and the average age of tumor  diagnosis is 48 years of age.    Due to Ms. Emerson's family history of breast cancer, we discussed hereditary breast cancer. Hereditary breast cancer accounts for 5-10% of all cases of breast cancer. A significant proportion (50%) of hereditary breast cancer can be attributed to mutations in the BRCA1 and BRCA2 genes. Mutations in these genes confer an increased risk for breast cancer, ovarian cancer, male breast cancer, prostate cancer, and pancreatic cancer. Women with a BRCA1 or BRCA2 mutation have over a 60% lifetime risk of breast cancer and up to a 58% risk of ovarian cancer. There are other clinically significant breast cancer related genes in addition to BRCA1/2, including PALB2, LUISITO, and CHEK2.     There are other hereditary cancer syndromes as well. Based on Ms. Emerson's family history and her desire to get more information regarding her personal risks, testing was pursued through a multigene panel evaluating several other genes known to increase the risk for cancer.    GENETIC TESTING:  The risks, benefits, and limitations of genetic testing and implications for clinical management following testing were reviewed. DNA test results can influence decisions regarding screening and prevention. Genetic testing can have significant psychological implications for both individuals and families. Also discussed was the possibility of employment and insurance discrimination based on genetic test results and the laws in place to prevent this, as well as the limitations of these laws.       We discussed panel testing, which would involve testing 76 genes associated with increased cancer risk. The implications of a positive or negative test result were discussed. We also discussed the importance of testing an affected relative and how a negative result for Ms. Emerson wouldn't necessarily mean the cancers presenting in her family weren't due to a genetic mutation that Ms. Emerson did not inherit. In general, a negative  genetic test result is most informative if a mutation has first been established in an affected member of the family. In cases where an affected individual is not available or interested in testing, it is appropriate to offer testing to an unaffected individual. We discussed the possibility that, in some cases, genetic test results may be ambiguous due to the identification of a genetic variant. These variants may or may not be associated with an increased cancer risk. With multigene panel testing, it is not uncommon for a variant of uncertain significance (VUS) to be identified. If a VUS is identified, testing family members is typically not recommended and screening recommendations are made based on the family history. The laboratories that perform genetic testing work to reclassify the VUS and send out an amended report if and when a VUS is reclassified. The majority of variant findings are ultimately reclassified to a negative result. Given Ms. Emerson's family history, a negative test result does not eliminate all cancer risk, although the risk would not be as high as it would with positive genetic testing.     TEST RESULTS: Genetic testing was negative for known pathogenic mutations by sequencing and rearrangement testing and RNA analysis for the 76 genes on the [facility] Expanded panel including the gene for Ynuf-Gctv-Ccgs syndrome (see attached). This negative result greatly lowers, but does not eliminate, the risk of a hereditary cancer syndrome for Ms. Emerson. We discussed how 96% of people with BHDS will have a positive genetics test result. We also discussed that she doesn't meet clinical criteria for the condition either. It is possible that the family history is due to a hereditary cancer syndrome that Ms. Emerson did not happen to inherit. This assessment is based on the information provided at the time of the consultation.     CANCER PREVENTION:  Based on computer modeling, Ms. Emerson's lifetime risk  for breast cancer would not be considered “high risk” (>20%). Per NCCN guidelines, it is appropriate for her to follow general population screening guidelines for her breast cancer risk including annual clinical breast exam and annual mammography. These assessments are based on the information provided at the time of consultation.    PLAN: Genetic counseling remains available to Ms. Emerson and her family. If she has any questions, she is welcome to call me at [phone].     Kim Walter MS, [facility], [facility]  Licensed Certified Genetic Counselor      Cc: Audrey Waldron MD

## 2025-04-28 NOTE — TELEPHONE ENCOUNTER
Attempted to contact Ms. Emerson regarding genetic test results. She did not answer. Left VM asking for her to return my call.

## 2025-05-05 ENCOUNTER — OFFICE VISIT (OUTPATIENT)
Dept: UROLOGY | Age: 75
End: 2025-05-05
Payer: MEDICARE

## 2025-05-05 VITALS — BODY MASS INDEX: 21.99 KG/M2 | WEIGHT: 132 LBS | HEIGHT: 65 IN

## 2025-05-05 DIAGNOSIS — C64.2 RENAL CELL CARCINOMA OF LEFT KIDNEY: Primary | ICD-10-CM

## 2025-05-05 LAB
BILIRUB BLD-MCNC: NEGATIVE MG/DL
CLARITY, POC: CLEAR
COLOR UR: YELLOW
EXPIRATION DATE: NORMAL
GLUCOSE UR STRIP-MCNC: NEGATIVE MG/DL
KETONES UR QL: NEGATIVE
LEUKOCYTE EST, POC: NEGATIVE
Lab: NORMAL
NITRITE UR-MCNC: NEGATIVE MG/ML
PH UR: 7 [PH] (ref 5–8)
PROT UR STRIP-MCNC: NEGATIVE MG/DL
RBC # UR STRIP: NEGATIVE /UL
SP GR UR: 1.01 (ref 1–1.03)
UROBILINOGEN UR QL: NORMAL

## 2025-05-05 PROCEDURE — 81003 URINALYSIS AUTO W/O SCOPE: CPT | Performed by: UROLOGY

## 2025-05-05 PROCEDURE — 1160F RVW MEDS BY RX/DR IN RCRD: CPT | Performed by: UROLOGY

## 2025-05-05 PROCEDURE — 1159F MED LIST DOCD IN RCRD: CPT | Performed by: UROLOGY

## 2025-05-05 PROCEDURE — 99213 OFFICE O/P EST LOW 20 MIN: CPT | Performed by: UROLOGY

## 2025-05-05 NOTE — PROGRESS NOTES
"Chief Complaint  Renal cell carcinoma of left kidney    Subjective            Audrey Emerson presents to [facility]    History of Present Illness  The patient presents for evaluation of her kidney.    She has expressed concerns regarding the potential enlargement of her kidney. Additionally, she has undergone genetic testing, which included an analysis of 76 genes. The results were negative for BRCA1 and BRCA2, as well as 74 other genes.    FAMILY HISTORY  Her daughter had a suspicious breast mammogram but tested negative for BRCA1 and BRCA2 genes.       Oncology/Hematology History   Renal cell carcinoma of left kidney   10/4/2024 Initial Diagnosis    Renal cell carcinoma of left kidney     10/29/2024 Cancer Staged    Staging form: Kidney, AJCC 8th Edition  - Clinical stage from 10/29/2024: Stage III (ycT3a, cN0, cM0) - Signed by Sandra Sanders MD on 11/6/2024     10/29/2024 Surgery    Surgery       Procedure:  Left robotic partial nephrectomy      Location: [facility]      Completeness of resection:  No evidence of residual tumor           Objective   Vital Signs:   Ht 165.1 cm (65\")   Wt 59.9 kg (132 lb)   BMI 21.97 kg/m²       Physical Exam  Vitals and nursing note reviewed.   Constitutional:       Appearance: Normal appearance. She is well-developed.   Pulmonary:      Effort: Pulmonary effort is normal.      Breath sounds: Normal air entry.   Neurological:      Mental Status: She is alert and oriented to person, place, and time.      Motor: Motor function is intact.   Psychiatric:         Mood and Affect: Mood normal.         Behavior: Behavior normal.          Result Review :   The following data was reviewed by: Sandra Sanders MD on 05/05/2025:    Results for orders placed or performed in visit on 05/05/25   POC Urinalysis Dipstick, Automated    Collection Time: 05/05/25  9:50 AM    Specimen: Urine   Result Value Ref Range    Color Yellow Yellow, Straw, Dark " Yellow, Melvina    Clarity, UA Clear Clear    Specific Gravity  1.015 1.005 - 1.030    pH, Urine 7.0 5.0 - 8.0    Leukocytes Negative Negative    Nitrite, UA Negative Negative    Protein, POC Negative Negative mg/dL    Glucose, UA Negative Negative mg/dL    Ketones, UA Negative Negative    Urobilinogen, UA 0.2 E.U./dL Normal, 0.2 E.U./dL    Bilirubin Negative Negative    Blood, UA Negative Negative    Lot Number [id number]     Expiration Date 03/31/26          Results  Imaging  Kidney looks smaller with no enhancement.        Results    Procedure Component Value Ref Range Date/Time   CT Abdomen Pelvis With & Without Contrast [[id number]] Collected: 04/30/25 1107   Order Status: Completed Updated: 04/30/25 1124   Narrative:     CT ABDOMEN PELVIS W WO CONTRAST    Date of Exam: 4/28/2025 8:31 AM EDT    Indication: Renal cell carcinoma.    Comparison: 11/21/2024.    Technique: Axial CT images were obtained of the abdomen and pelvis before and after the uneventful intravenous administration of iodinated contrast. Sagittal and coronal reconstructions were performed.  Automated exposure control and iterative  reconstruction methods were used.      Findings:    Lower Thorax: Lung bases are clear.    Liver: No focal hepatic lesions. Normal size liver.    Gallbladder and bile ducts: Postcholecystectomy. Prominence of the common bile duct and mild intrahepatic biliary ductal dilatation is again seen, likely reservoir effect.    Spleen: Spleen is normal size.  No focal splenic lesions.    Adrenal glands: Unremarkable.    Pancreas: No focal masses.  No pancreatic duct dilation. No surrounding inflammation.    Kidneys: Post partial left nephrectomy along the resection margin anteriorly, there is nonenhancing hypodensity as well as adjacent fatty attenuation, probably posttreatment changes. This is decreased compared to the prior study, now measures 2.5 x 2.6  cm., Previous treatment cavity measured 5.5 x 5.1 cm. The kidneys are  otherwise symmetric without hydronephrosis. No renal calculi. Subcentimeter cyst interpolar right kidney.    Ureters: No obstructing calculi or mass.    Stomach and Bowel: No abnormally dilated loops of bowel.  No significant hiatal hernia.  No significant bowel wall thickening.  Ligament of Treitz has normal anatomic position. Incidentally, the cecum is located in the pelvis.  Peritoneum: No free air or free fluid.    Appendix: Appendix is well seen and is normal.    Abdominal aorta and Vascular Structures: No aneurysmal dilation. No significant atherosclerotic disease.    Reproductive Organs: Post hysterectomy.    Urinary bladder: Urinary bladder has normal appearance on CT given degree of distention.    Lymph nodes: No pathologically enlarged lymph nodes.    Soft tissues: Unremarkable.    Osseous structures: No aggressive focal lytic or sclerotic osseous lesions.   Impression:     Post left partial nephrectomy with decreased size of treatment cavity anteriorly along the left upper pole without enhancement on CT. Continued 6-month follow-up is recommended.            Electronically Signed: Nani Jackson MD   4/30/2025 11:22 AM EDT   Workstation ID: [id number]            Assessment and Plan    Diagnoses and all orders for this visit:    1. Renal cell carcinoma of left kidney (Primary)  -     POC Urinalysis Dipstick, Automated  -     CT Abdomen Pelvis With & Without Contrast; Future      Assessment & Plan    Her CT scan was normal with no observed enhancement. The Surgicel appears to be gradually resorbing, which is a positive sign. A CT scan has been ordered for further evaluation in 6 months. If the results are normal, subsequent scans will be scheduled annually.    Follow-up  The patient will follow up in 6 months.          Follow Up       Return in about 6 months (around 11/5/2025) for Next scheduled follow up, CT scan prior.  Patient was given instructions and counseling regarding her condition or for health  maintenance advice. Please see specific information pulled into the AVS if appropriate.     Transcribed from ambient dictation for Sandra Sanders MD by Sandra Sanders MD.  05/05/25   10:16 EDT    Patient or patient representative verbalized consent for the use of Ambient Listening during the visit with  Sandra Sanders MD for chart documentation. 5/5/2025  10:16 EDT

## 2025-06-09 ENCOUNTER — TELEPHONE (OUTPATIENT)
Dept: FAMILY MEDICINE CLINIC | Facility: CLINIC | Age: 75
End: 2025-06-09

## 2025-06-09 ENCOUNTER — OFFICE VISIT (OUTPATIENT)
Dept: FAMILY MEDICINE CLINIC | Facility: CLINIC | Age: 75
End: 2025-06-09
Payer: MEDICARE

## 2025-06-09 VITALS
WEIGHT: 132 LBS | HEART RATE: 60 BPM | OXYGEN SATURATION: 98 % | DIASTOLIC BLOOD PRESSURE: 79 MMHG | BODY MASS INDEX: 21.99 KG/M2 | TEMPERATURE: 97.9 F | HEIGHT: 65 IN | SYSTOLIC BLOOD PRESSURE: 143 MMHG

## 2025-06-09 DIAGNOSIS — R35.0 FREQUENT URINATION: Primary | ICD-10-CM

## 2025-06-09 DIAGNOSIS — R35.0 URINARY FREQUENCY: ICD-10-CM

## 2025-06-09 LAB
BILIRUB BLD-MCNC: NEGATIVE MG/DL
CLARITY, POC: CLEAR
COLOR UR: YELLOW
EXPIRATION DATE: ABNORMAL
GLUCOSE UR STRIP-MCNC: NEGATIVE MG/DL
KETONES UR QL: NEGATIVE
LEUKOCYTE EST, POC: ABNORMAL
Lab: ABNORMAL
NITRITE UR-MCNC: NEGATIVE MG/ML
PH UR: 6 [PH] (ref 5–8)
PROT UR STRIP-MCNC: NEGATIVE MG/DL
RBC # UR STRIP: ABNORMAL /UL
SP GR UR: 1.01 (ref 1–1.03)
UROBILINOGEN UR QL: ABNORMAL

## 2025-06-09 PROCEDURE — 87077 CULTURE AEROBIC IDENTIFY: CPT | Performed by: FAMILY MEDICINE

## 2025-06-09 PROCEDURE — 81003 URINALYSIS AUTO W/O SCOPE: CPT | Performed by: FAMILY MEDICINE

## 2025-06-09 PROCEDURE — 99213 OFFICE O/P EST LOW 20 MIN: CPT | Performed by: FAMILY MEDICINE

## 2025-06-09 PROCEDURE — 87086 URINE CULTURE/COLONY COUNT: CPT | Performed by: FAMILY MEDICINE

## 2025-06-09 PROCEDURE — 1125F AMNT PAIN NOTED PAIN PRSNT: CPT | Performed by: FAMILY MEDICINE

## 2025-06-09 PROCEDURE — 87186 SC STD MICRODIL/AGAR DIL: CPT | Performed by: FAMILY MEDICINE

## 2025-06-09 RX ORDER — SULFAMETHOXAZOLE AND TRIMETHOPRIM 800; 160 MG/1; MG/1
1 TABLET ORAL 2 TIMES DAILY
Qty: 10 TABLET | Refills: 0 | Status: SHIPPED | OUTPATIENT
Start: 2025-06-09

## 2025-06-09 NOTE — PROGRESS NOTES
Chief Complaint   Patient presents with    Urinary Tract Infection        Subjective     Audrey Emerson  has a past medical history of Acromioclavicular separation, Allergic, Allergic rhinitis, Ankle sprain, Arrhythmia, Arthritis, Arthritis of back (Teenager), Arthritis of neck, Bursitis of hip, Cervical disc disorder, Cholelithiasis (surgery 2007), Corns and callus, Dental disease, Dislocated elbow, Dislocation of finger, Dislocation, shoulder, Dizziness (6/24), Jhonny-Danlos disease, Foot pain, bilateral (12/17/2019), Fracture, finger, Fracture, foot, Frozen shoulder (1980+), Gastrointestinal ulcer (1970), Headache, Heart murmur, Hip arthrosis (1980+), Knee sprain, Knee swelling (1960+), Low back strain, Lumbosacral disc disease, Mitral valve disorder, Mitral valve prolapse, Neck strain, Osteopenia, Osteoporosis, Periarthritis of shoulder, PONV (postoperative nausea and vomiting), Pulmonary embolism, Renal cell carcinoma of left kidney, Restless leg, Rotator cuff syndrome (1970-80), Seasonal allergies, Sleep apnea (9/20), Stress fracture, Tendinitis of knee, Tennis elbow, Uterine fibroid (1980), and Wrist sprain.    Possible UTI- Symptoms started yesterday, she has had some burning, urinary urgency, left flank and pelvic aching, chills, and does feel malaised. She also describes some urine that has a bad odor to it. She has increased her fluids but not found much relief. She last had a UTI last July and they found a renal cell carcinoma. Her last CT scan in May was clear and showed no reoccurance.         PHQ-2 Depression Screening  Little interest or pleasure in doing things?     Feeling down, depressed, or hopeless?     PHQ-2 Total Score     PHQ-9 Depression Screening  Little interest or pleasure in doing things?     Feeling down, depressed, or hopeless?     Trouble falling or staying asleep, or sleeping too much?     Feeling tired or having little energy?     Poor appetite or overeating?     Feeling bad about  yourself - or that you are a failure or have let yourself or your family down?     Trouble concentrating on things, such as reading the newspaper or watching television?     Moving or speaking so slowly that other people could have noticed? Or the opposite - being so fidgety or restless that you have been moving around a lot more than usual?     Thoughts that you would be better off dead, or of hurting yourself in some way?     PHQ-9 Total Score     If you checked off any problems, how difficult have these problems made it for you to do your work, take care of things at home, or get along with other people?       Allergies   Allergen Reactions    Pentazocine Hallucinations       Prior to Admission medications    Medication Sig Start Date End Date Taking? Authorizing Provider   aspirin 81 MG EC tablet Take 1 tablet by mouth Daily.   Yes Dimas Lopez MD   atenolol (TENORMIN) 25 MG tablet Take 1 tablet by mouth Daily.  Patient taking differently: Take 1 tablet by mouth Every Night. 10/3/24  Yes Mathew Mehta DO   cholecalciferol (VITAMIN D3) 25 MCG (1000 UT) tablet Take 1 tablet by mouth Every Night.   Yes Dimas Lopez MD   estradiol (ESTRACE) 1 MG tablet Take 1 tablet by mouth Daily.  Patient taking differently: Take 1 tablet by mouth Every Night. 10/3/24  Yes Mathew Mehta DO   folic acid (FOLVITE) 1 MG tablet Take 1 tablet by mouth Every Night.   Yes Dimas Lopez MD   Magnesium Citrate 200 MG tablet Take 1 tablet by mouth Every Night.   Yes Dimas Lopez MD   Misc Natural Products (Beet Root) 500 MG capsule Take 1 tablet by mouth Every Night.   Yes Dimas Lopez MD   Prenatal Vit-Fe Fumarate-FA (PRENATAL, CLASSIC, VITAMIN) 28-0.8 MG tablet tablet Take 1 tablet by mouth Every Night.   Yes Dimas Lopez MD   promethazine (PHENERGAN) 25 MG tablet Take 1 tablet by mouth Every 6 (Six) Hours As Needed for Nausea or Vomiting. 10/3/24  Yes Tyson  Mathew Sam DO   rOPINIRole (REQUIP) 1 MG tablet Take 2 tablets by mouth every night at bedtime. 2/3/25  Yes Mathew Mehta DO   fluticasone (FLONASE) 50 MCG/ACT nasal spray Administer 2 sprays into the nostril(s) as directed by provider Daily.  Patient not taking: Reported on 2025 3/17/25   Mathew Mehta DO   methylPREDNISolone (MEDROL) 4 MG dose pack Take as directed on package instructions. 3/17/25   Mathew Mehta DO   Turmeric 500 MG capsule Take 1 capsule by mouth Daily.  Patient not taking: Reported on 2025    Provider, MD Dimas        Patient Active Problem List   Diagnosis    Precordial chest pain    JAIDA on CPAP    Jhonny-Danlos disease    Osteoporosis    Restless leg    Arthritis    Encounter for annual physical exam    Encounter for screening mammogram for malignant neoplasm of breast    Acute non-recurrent maxillary sinusitis    Medicare annual wellness visit, subsequent    Corns and callus    Gastrointestinal ulcer    Pain of foot    Seasonal allergic rhinitis    Encounter for screening for malignant neoplasm of colon    Renal cell carcinoma of left kidney    Left renal mass    Hospital discharge follow-up    Dysfunction of right eustachian tube    Urinary frequency        Past Surgical History:   Procedure Laterality Date    BREAST LUMPECTOMY Bilateral     1984    BRONCHOSCOPY       SECTION      CHOLECYSTECTOMY      COLONOSCOPY  2003    COLONOSCOPY N/A 2023    Procedure: COLONOSCOPY;  Surgeon: Raul Laughlin MD;  Location: [facility] ENDOSCOPY;  Service: Gastroenterology;  Laterality: N/A;  DIVERTICULOSIS    HYSTERECTOMY      total    MYOMECTOMY  1980    NEPHRECTOMY PARTIAL Left 10/29/2024    Procedure: NEPHRECTOMY PARTIAL LAPAROSCOPIC WITH DAVINCI ROBOT  Removal of part of the kidney using cameras and the da Anneliese robot;  Surgeon: Sandra Sanders MD;  Location: [facility] MAIN OR;  Service: Robotics - DaVinci;   Laterality: Left;    ROTATOR CUFF REPAIR Bilateral     1996 1989    SALPINGO OOPHORECTOMY  1999    bso    SHOULDER SURGERY      TOTAL ABDOMINAL HYSTERECTOMY WITH SALPINGO OOPHORECTOMY  1999    TRIGGER POINT INJECTION         Social History     Socioeconomic History    Marital status:    Tobacco Use    Smoking status: Never     Passive exposure: Past    Smokeless tobacco: Never   Vaping Use    Vaping status: Never Used   Substance and Sexual Activity    Alcohol use: No    Drug use: Never    Sexual activity: Yes     Partners: Male     Birth control/protection: Post-menopausal, Hysterectomy     Comment: on estradiol       Family History   Problem Relation Age of Onset    Stroke Mother     Arthritis Mother     Osteoporosis Mother     Meningioma Mother         Meningoma brain    Clotting disorder Mother         Jhonny Danlos    Collagen disease Mother         Jhonny Danlos    Dislocations Mother     Scoliosis Mother     Severe sprains Mother     Cancer Mother     Heart failure Mother         listed as part of cause of death stoke secondary to meningoma non operable    Osteoarthritis Mother     Migraines Mother     Rashes / Skin problems Mother     COPD Mother     Heart disease Mother     Miscarriages / Stillbirths Mother     Anemia Mother     Heart disease Father         patent foramen ovale    Colon polyps Father         at autopsy    Myelofibrosis Father     Cancer Father     COPD Father     Breast cancer Sister         dx 40s    Arthritis Sister     Anesthesia problems Sister     Breast cancer - additional onset Sister         dx 50s    Cancer Sister         breast cancer x2    Arthritis Brother     Heart disease Brother     Brain tumor (benign) Brother     Cancer Brother         Liver brain cancer    Broken bones Brother     Alcohol abuse Brother     COPD Brother     Stomach cancer Paternal Grandfather     Arthritis Daughter         Ehrlers danlos    Anxiety disorder Daughter     Depression Daughter      "Miscarriages / Stillbirths Daughter     Stomach cancer Maternal Uncle     Prostate cancer Maternal Uncle     Stomach cancer Maternal Uncle         3 uncles    Kidney cancer Maternal Uncle         2 uncles    Lung cancer Paternal Uncle     Anemia Brother     Heart attack Brother         AMI 1998 age62    Arthritis Sister         Takes Intrvenous medication    Hearing loss Brother     Colon cancer Neg Hx        Family history, surgical history, past medical history, Allergies and meds reviewed with patient today and updated in [facility] EMR.     ROS:  Review of Systems   Constitutional:  Positive for chills. Negative for activity change, fatigue and fever.   Respiratory:  Negative for chest tightness and shortness of breath.    Cardiovascular:  Negative for chest pain and palpitations.   Gastrointestinal:  Positive for nausea. Negative for vomiting.   Genitourinary:  Positive for dysuria, flank pain, frequency, pelvic pressure and urgency. Negative for difficulty urinating and hematuria.       OBJECTIVE:  Vitals:    06/09/25 1540   BP: 143/79   BP Location: Left arm   Patient Position: Sitting   Pulse: 60   Temp: 97.9 °F (36.6 °C)   SpO2: 98%   Weight: 59.9 kg (132 lb)   Height: 165.1 cm (65\")     No results found.   Body mass index is 21.97 kg/m².  No LMP recorded. Patient has had a hysterectomy.    The 10-year ASCVD risk score (Chuck DK, et al., 2019) is: 15.4%    Values used to calculate the score:      Age: 74 years      Sex: Female      Is Non- : No      Diabetic: No      Tobacco smoker: No      Systolic Blood Pressure: 143 mmHg      Is BP treated: No      HDL Cholesterol: 61 mg/dL      Total Cholesterol: 170 mg/dL     Physical Exam  Vitals and nursing note reviewed.   Constitutional:       General: She is not in acute distress.     Appearance: Normal appearance. She is normal weight.   HENT:      Head: Normocephalic.   Cardiovascular:      Rate and Rhythm: Normal rate and regular rhythm.      " Pulses: Normal pulses.      Heart sounds: Normal heart sounds. No murmur heard.  Pulmonary:      Effort: Pulmonary effort is normal.      Breath sounds: Normal breath sounds. No wheezing, rhonchi or rales.   Abdominal:      Tenderness: There is abdominal tenderness (slight discomfort). There is left CVA tenderness. There is no right CVA tenderness.   Skin:     General: Skin is warm and dry.      Coloration: Skin is not jaundiced.      Findings: No rash.   Neurological:      General: No focal deficit present.      Mental Status: She is alert and oriented to person, place, and time.   Psychiatric:         Mood and Affect: Mood normal.         Thought Content: Thought content normal.         Judgment: Judgment normal.         Procedures    Office Visit on 06/09/2025   Component Date Value Ref Range Status    Color 06/09/2025 Yellow  Yellow, Straw, Dark Yellow, Melvina Final    Clarity, UA 06/09/2025 Clear  Clear Final    Specific Gravity  06/09/2025 1.010  1.005 - 1.030 Final    pH, Urine 06/09/2025 6.0  5.0 - 8.0 Final    Leukocytes 06/09/2025 Small (1+) (A)  Negative Final    Nitrite, UA 06/09/2025 Negative  Negative Final    Protein, POC 06/09/2025 Negative  Negative mg/dL Final    Glucose, UA 06/09/2025 Negative  Negative mg/dL Final    Ketones, UA 06/09/2025 Negative  Negative Final    Urobilinogen, UA 06/09/2025 0.2 E.U./dL  Normal, 0.2 E.U./dL Final    Bilirubin 06/09/2025 Negative  Negative Final    Blood, UA 06/09/2025 Trace (A)  Negative Final    Lot Number 06/09/2025 [id number]   Final    Expiration Date 06/09/2025 11/30/2025   Final       ASSESSMENT/ PLAN:    Diagnoses and all orders for this visit:    1. Frequent urination (Primary)  -     POCT urinalysis dipstick, automated  -     Urine Culture - Urine, Urine, Clean Catch; Future  -     Urine Culture - Urine, Urine, Clean Catch    2. Urinary frequency  Assessment & Plan:  Urinalysis shows some trace blood and small leukocytes. We will send this off for culture  and go ahead and treat empirically with Bactrim.       Other orders  -     sulfamethoxazole-trimethoprim (Bactrim DS) 800-160 MG per tablet; Take 1 tablet by mouth 2 (Two) Times a Day.  Dispense: 10 tablet; Refill: 0        BMI is within normal parameters. No other follow-up for BMI required.      Orders Placed Today:     New Medications Ordered This Visit   Medications    sulfamethoxazole-trimethoprim (Bactrim DS) 800-160 MG per tablet     Sig: Take 1 tablet by mouth 2 (Two) Times a Day.     Dispense:  10 tablet     Refill:  0        Management Plan:     An After Visit Summary was printed and given to the patient at discharge.    Follow-up: Return if symptoms worsen or fail to improve.    Mathew Mehta DO 6/9/2025 16:30 EDT  This note was electronically signed.

## 2025-06-09 NOTE — ASSESSMENT & PLAN NOTE
Urinalysis shows some trace blood and small leukocytes. We will send this off for culture and go ahead and treat empirically with Bactrim.

## 2025-06-09 NOTE — TELEPHONE ENCOUNTER
Caller: Audrey Emerson    Relationship: Self    Best call back number: [phone]    What orders are you requesting (i.e. lab or imaging): URINE ANALYSIS FOR UTI SYMPTOMS- STATES SHE IS HAVING BURNING/FREQUENT URINATION AND ABDOMINAL PAIN     In what timeframe would the patient need to come in: TODAY     Where will you receive your lab/imaging services: IN OFFICE

## 2025-06-11 ENCOUNTER — OFFICE VISIT (OUTPATIENT)
Dept: ORTHOPEDIC SURGERY | Facility: CLINIC | Age: 75
End: 2025-06-11
Payer: MEDICARE

## 2025-06-11 VITALS
DIASTOLIC BLOOD PRESSURE: 76 MMHG | HEIGHT: 65 IN | SYSTOLIC BLOOD PRESSURE: 131 MMHG | BODY MASS INDEX: 21.99 KG/M2 | OXYGEN SATURATION: 97 % | WEIGHT: 132 LBS | HEART RATE: 63 BPM

## 2025-06-11 DIAGNOSIS — M18.0 ARTHRITIS OF CARPOMETACARPAL (CMC) JOINTS OF BOTH THUMBS: Primary | ICD-10-CM

## 2025-06-11 LAB — BACTERIA SPEC AEROBE CULT: ABNORMAL

## 2025-06-11 RX ORDER — LIDOCAINE HYDROCHLORIDE 10 MG/ML
1 INJECTION, SOLUTION INFILTRATION; PERINEURAL
Status: COMPLETED | OUTPATIENT
Start: 2025-06-11 | End: 2025-06-11

## 2025-06-11 RX ORDER — TRIAMCINOLONE ACETONIDE 40 MG/ML
40 INJECTION, SUSPENSION INTRA-ARTICULAR; INTRAMUSCULAR
Status: COMPLETED | OUTPATIENT
Start: 2025-06-11 | End: 2025-06-11

## 2025-06-11 RX ADMIN — TRIAMCINOLONE ACETONIDE 40 MG: 40 INJECTION, SUSPENSION INTRA-ARTICULAR; INTRAMUSCULAR at 13:30

## 2025-06-11 RX ADMIN — LIDOCAINE HYDROCHLORIDE 1 ML: 10 INJECTION, SOLUTION INFILTRATION; PERINEURAL at 13:30

## 2025-06-11 NOTE — PROGRESS NOTES
Chief Complaint  No chief complaint on file.     Subjective      Audrey Emerson presents to [facility] for follow up of bilateral hands.  She is here for bilateral hand steroid injections. She has pain in bilateral thumbs.  She cannot undo a jar lid and has difficulty picking up a coffee pot.  She is here today for bilateral CMC joint arthritis. Her last injections were 10/23/24.  She has had increase pain for the last few weeks and is having increase difficulty with  and FMC tasks.     Allergies   Allergen Reactions    Pentazocine Hallucinations        Social History     Socioeconomic History    Marital status:    Tobacco Use    Smoking status: Never     Passive exposure: Past    Smokeless tobacco: Never   Vaping Use    Vaping status: Never Used   Substance and Sexual Activity    Alcohol use: No    Drug use: Never    Sexual activity: Yes     Partners: Male     Birth control/protection: Post-menopausal, Hysterectomy     Comment: on estradiol        I reviewed the patient's chief complaint, history of present illness, review of systems, past medical history, surgical history, family history, social history, medications, and allergy list.     Review of Systems     Constitutional: Denies fevers, chills, weight loss  Cardiovascular: Denies chest pain, shortness of breath  Skin: Denies rashes, acute skin changes  Neurologic: Denies headache, loss of consciousness        Vital Signs:   There were no vitals taken for this visit.         Physical Exam  General: Alert. No acute distress    Ortho Exam        BILATERAL HANDS Negative Compression testing/ Negative Tinels. NegativeFinkelsteins. Negative House's testing. Positive CMC grind testing. Negative Phalens. Full ROM of the hand, fingers, elbow and wrist. Negative Triggering of the digit. Sensation grossly intact to light touch, median, radial and ulnar nerve. Positive AIN, PIN and ulnar nerve motor function intact. Axillary nerve  intact. Positive pulses.           Small Joint Arthrocentesis: R thumb CMC  Consent given by: patient  Site marked: site marked  Timeout: Immediately prior to procedure a time out was called to verify the correct patient, procedure, equipment, support staff and site/side marked as required   Procedure Details  Location: thumb - R thumb CMC  Preparation: Patient was prepped and draped in the usual sterile fashion  Needle gauge: 23G.  Medications administered: 1 mL lidocaine 1 %; 40 mg triamcinolone acetonide 40 MG/ML  Patient tolerance: patient tolerated the procedure well with no immediate complications      Small Joint Arthrocentesis: L thumb CMC  Consent given by: patient  Site marked: site marked  Timeout: Immediately prior to procedure a time out was called to verify the correct patient, procedure, equipment, support staff and site/side marked as required   Procedure Details  Location: thumb - L thumb CMC  Preparation: Patient was prepped and draped in the usual sterile fashion  Needle gauge: 23G.  Medications administered: 1 mL lidocaine 1 %; 40 mg triamcinolone acetonide 40 MG/ML  Patient tolerance: patient tolerated the procedure well with no immediate complications    This injection documentation was Scribed for Nabeel Perez MD by Johnna Gallagher.  06/11/25   15:03 EDT        Imaging Results (Most Recent)       None             Result Review :               Assessment and Plan     Diagnoses and all orders for this visit:    1. Arthritis of carpometacarpal (CMC) joints of both thumbs (Primary)        Discussed the risks and benefits of conservative measures. The patient expressed understanding and wished to proceed with bilateral thumb CMC injections.   She tolerated the injections well.      Discussed with the patient that due to the steroid injection given today in the office they may see an increase in blood sugar for a few days. Advised patient to monitor sugar after receiving the injection.      Discussed possibility of a reaction from the injection.  Discussed the possibility that the injection may not completely improve or remove the pain.  Discussed the risk of infection.      Call or return if worsening symptoms.    Follow Up     PRN      Patient was given instructions and counseling regarding her condition or for health maintenance advice. Please see specific information pulled into the AVS if appropriate.     Scribed for Nabeel Perez MD by Chula Pickett MA.  06/11/25   13:45 EDT    I have personally performed the services described in this document as scribed by the above individual and it is both accurate and complete. Nabeel Perez MD 06/11/25

## (undated) DEVICE — TOWEL,OR,DSP,ST,BLUE,STD,4/PK,20PK/CS: Brand: MEDLINE

## (undated) DEVICE — STERILE POLYISOPRENE POWDER-FREE SURGICAL GLOVES: Brand: PROTEXIS

## (undated) DEVICE — VIOLET POLYDIOXANONE POLYMER, SYNTHETIC ABSORBABLE SUTURE CLIPS: Brand: LAPRA-TY

## (undated) DEVICE — NON-WOVEN ADHESIVE WOUND DRESSING: Brand: PRIMAPORE ADHESIVE WOUND DRSG 7.2*5CM

## (undated) DEVICE — LINER SURG CANSTR SXN S/RIGD 1500CC

## (undated) DEVICE — SOL IRRG H2O PL/BG 1000ML STRL

## (undated) DEVICE — 2, DISPOSABLE SUCTION/IRRIGATOR WITHOUT DISPOSABLE TIP: Brand: STRYKEFLOW

## (undated) DEVICE — GAMMEX® NON-LATEX SIZE 7.5, STERILE NEOPRENE POWDER-FREE SURGICAL GLOVE: Brand: GAMMEX

## (undated) DEVICE — TISSUE RETRIEVAL SYSTEM: Brand: INZII RETRIEVAL SYSTEM

## (undated) DEVICE — LAPAROSCOPIC TROCAR SLEEVE/SINGLE USE: Brand: KII® OPTICAL ACCESS SYSTEM

## (undated) DEVICE — TIP COVER ACCESSORY

## (undated) DEVICE — SUT VIC 2/0 SH 27IN

## (undated) DEVICE — THE FLOSEAL MALLEABLE TIP AND TRIMMABLE TIP ARE INTENDED FOR DELIVERY OF FLOSEAL HEMOSTATIC MATRIX.: Brand: FLOSEAL SPECIAL APPLICATOR TIPS

## (undated) DEVICE — SOL NACL 0.9PCT 1000ML

## (undated) DEVICE — SOL IRR NACL 0.9PCT BT 1000ML

## (undated) DEVICE — SYR LL TP 10ML STRL

## (undated) DEVICE — ANTIBACTERIAL VIOLET BRAIDED (POLYGLACTIN 910), SYNTHETIC ABSORBABLE SUTURE: Brand: COATED VICRYL

## (undated) DEVICE — DAVINCI-LF: Brand: MEDLINE INDUSTRIES, INC.

## (undated) DEVICE — SOLIDIFIER LIQLOC PLS 1500CC BT

## (undated) DEVICE — SUT VIC 0 UR6 27IN VCP603H

## (undated) DEVICE — SYS CLS CARTR/THOMSN SG 10/12 AND 15MM

## (undated) DEVICE — SLV SCD KN/LEN ADJ EXPRSS BLENDED MD 1P/U

## (undated) DEVICE — LAPAROSCOPIC SCISSORS: Brand: EPIX LAPAROSCOPIC SCISSORS

## (undated) DEVICE — ARM DRAPE

## (undated) DEVICE — CONN JET HYDRA H20 AUXILIARY DISP

## (undated) DEVICE — PENCL SMOKE/EVAC MEGADYNE TELESCP 10FT

## (undated) DEVICE — SUT VIC PLS CTD BR 0 TIE 18IN VIL

## (undated) DEVICE — TOTAL TRAY, 16FR 10ML SIL FOLEY, URN: Brand: MEDLINE

## (undated) DEVICE — SUT MNCRYL PLS ANTIB UD 4/0 PS2 18IN

## (undated) DEVICE — SYR LUERLOK 30CC

## (undated) DEVICE — Device: Brand: DEFENDO AIR/WATER/SUCTION AND BIOPSY VALVE

## (undated) DEVICE — Device

## (undated) DEVICE — SYS CLOSE PORTII CARTR/THOMASN XL

## (undated) DEVICE — SEAL

## (undated) DEVICE — TROCAR: Brand: KII FIOS FIRST ENTRY

## (undated) DEVICE — NON-WOVEN ADHESIVE WOUND DRESSING: Brand: PRIMAPORE ADHESIVE DRESSING 10*8CM